# Patient Record
Sex: FEMALE | Race: WHITE | Employment: OTHER | URBAN - METROPOLITAN AREA
[De-identification: names, ages, dates, MRNs, and addresses within clinical notes are randomized per-mention and may not be internally consistent; named-entity substitution may affect disease eponyms.]

---

## 2020-05-21 ENCOUNTER — NURSE TRIAGE (OUTPATIENT)
Dept: NURSING | Facility: CLINIC | Age: 85
End: 2020-05-21

## 2020-05-21 NOTE — TELEPHONE ENCOUNTER
Dementia and fell on Monday. Refused transport to the ER. She's fallen one other time.  She is at assisted living. One foot and leg is swollen. They suggested to go to urgent care. Is there something else to do? She's deaf.   Doesn't know what extent of injury doesn't know how bad leg is. It's a closed facility because of Covid-19. They removed the chemicals from the apartment.  I suggested she have Jewels transported to the ER for evaluation because of her refusal of care, dementia and fall histories. Jewels is related to the caller's  so we discussed the need for Jewels to sign a release of information consent so they ER staff can talk with family to fill in the history. Advised 911 if she's unable to get Jewels to the ER via other transport.  Tish Foster RN  [facility]    Additional Information    Negative: Nursing judgment    Negative: Nursing judgment    Negative: Nursing judgment    Negative: Nursing judgment    Information only question and nurse able to answer    Protocols used: NO PROTOCOL AVAILABLE - INFORMATION ONLY-A-[place]

## 2020-05-22 ENCOUNTER — DOCUMENTATION ONLY (OUTPATIENT)
Dept: OTHER | Facility: CLINIC | Age: 85
End: 2020-05-22

## 2020-05-22 ENCOUNTER — VIRTUAL VISIT (OUTPATIENT)
Dept: GERIATRICS | Facility: CLINIC | Age: 85
End: 2020-05-22
Payer: MEDICARE

## 2020-05-22 DIAGNOSIS — R29.6 RECURRENT FALLS: ICD-10-CM

## 2020-05-22 DIAGNOSIS — F02.818 DEMENTIA ASSOCIATED WITH OTHER UNDERLYING DISEASE WITH BEHAVIORAL DISTURBANCE (H): Primary | ICD-10-CM

## 2020-05-22 DIAGNOSIS — M40.00 ACQUIRED POSTURAL KYPHOSIS: ICD-10-CM

## 2020-05-22 DIAGNOSIS — B35.1 ONYCHOMYCOSIS DUE TO DERMATOPHYTE: ICD-10-CM

## 2020-05-22 DIAGNOSIS — M79.89 RIGHT LEG SWELLING: ICD-10-CM

## 2020-05-22 DIAGNOSIS — H90.3 BILATERAL SENSORINEURAL HEARING LOSS: ICD-10-CM

## 2020-05-22 NOTE — LETTER
"    5/22/2020        RE: Jewels Cortez  [place] [address]  [address]  [facility] [address]         [facility]  Jewels Cortez is being evaluated via a billable video visit due to the restrictions of the Covid-19 pandemic.   The patient has been notified of following:    \"This video visit will be conducted via a call between you and your provider. We have found that certain health care needs can be provided without the need for an in-person physical exam.  This service lets us provide the care you need with a video conversation. If during the course of the call the provider feels a video visit is not appropriate, you will not be charged for this service.\"   The provider has received verbal consent for a Video Visit from the patient and or first contact? Yes  Patient/facility staff would like the video invitation sent by: Send to e-mail at: [facility] Start Time: 12:04  Which Facility the Patient is at during the time of visit: [facility]    PRIMARY CARE PROVIDER AND CLINIC:  Holden Block, LAY CNP, [address] [address] / [facility] [address]  Chief Complaint   Patient presents with     Establish Care     Video Visit     [facility] Medical Record Number:  [id number]  Jewels Cortez  is a 85 year old  (1935), admitted to the above facility from home..  Admitted to this facility for  rehab, medical management and nursing care.  HPI:    HPI information obtained from: facility chart records, facility staff and patient report.   Ms. Kline moved to [place] from [place] because her nephew lives here.  She has no other family, she is a .  [facility]  However she has had a couple of falls or found on the floor with the last incident.  She was looking for papers.  She was also stating that someone came in and stole her kitchen cabinets.  She is profoundly hard of hearing and the nurse with her is able " "to get my questions asked.  Today she tells me she feels safe in her current apartment and there are a lot of nice people around to help her.  She denies pain.  She confirms she takes no medications.  Does admit to taking Tylenol sometimes for a back ache.  States her bowels are working fine and since her bladder surgery with hysterectomy, her bladder works well.  Denies any breathing problems, no heart problems.  Never gets headaches.  States her appetite is good, no difficulty chewing or swallowing.  I did a brief memory assessment and she does have difficulty with recall and names.  She did state the name of the facility she is at.  The President is \"the one I didn't vote for.\"    We talked about her falling and she is agreeable to getting assessed ane will use a walker.    Regarding her right leg swelling she said it did happen about 2 or 3 times before, doesn't remember what pill the Doctor in [place] gave her.     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)  Right leg swelling  Bilateral sensorineural hearing loss  Recurrent falls  Onychomycosis due to dermatophyte  Acquired postural kyphosis    CODE STATUS/ADVANCE DIRECTIVES DISCUSSION:   CPR/Full code , as per POLST she signed    Patient's living condition: lives in an assisted living facility     ALLERGIES: Penicillins and Sulfa drugs     PAST MEDICAL HISTORY:  has a past medical history of Acquired postural kyphosis (5/23/2020), Bilateral sensorineural hearing loss wears hearing aides (5/22/2020), Cancer (H), Cataract (2019), 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  PAST SURGICAL HISTORY:   has a past surgical history that includes Hysterectomy; CERVICAL NECK SURGERY; LUMBAR BACK SURGERY; BLADDER LIFT SURGERY; cataract iol, rt/lt (Bilateral, 2020); and skin " cancer.  FAMILY HISTORY: she has no living children, her parents passed away many years ago and cannot recall their medical history.  SOCIAL HISTORY:   reports that she has never smoked. She has never used smokeless tobacco. She reports previous alcohol use. She reports that she does not use drugs.     MEDICATIONS:  NONE  No current outpatient medications on file.        ROS: 10 point ROS of systems including Constitutional, Eyes, Respiratory, Cardiovascular, Gastroenterology, Genitourinary, Integumentary, Musculoskeletal, Psychiatric were all negative except for pertinent positives noted in my HPI.  Vitals:/52   Pulse 69   Temp 98.8  F (37.1  C)   SpO2 98%    Limited Visit Exam done given COVID-19 precautions:  GENERAL APPEARANCE:  Alert, in no distress, thin  ENT:  [place]  EYES:  Conjunctiva and lids normal  RESP:  no respiratory distress  CV:  peripheral edema 3+ in right leg none in left leg  M/S:   Gait and station abnormal severe kyphosis  Digits and nails abnormal large phylangeal joints consistent with osteoarthritis.  all 10 toenails very long, hypertrophic .  Right knee pain with lateral and flexing movements  SKIN:  Inspection of skin and subcutaneous tissuethin and fragile.  multiple deep purple bruises noted on arms and legs..  varicose veins noted both legs  PSYCH:  insight and judgement impaired, memory impaired , affect and mood normal    Lab/Diagnostic data:  none to review.  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)  (primary encounter diagnosis)  Comment: progressive and supports her need or Assisted Living Level of care   Plan: assisted living package that includes light house keeping, mediation management (no medication at this time), assist with ADLs    (M79.89) Right leg swelling  Comment: acute onset with remote history 2-3 times before  Plan: Right leg venous  Ultrasound to rule out DVT 2/2 unilateral edema    (H90.3) Bilateral sensorineural hearing loss wears hearing aides  Comment: profound  Plan: adjust speaking tone and volume to communicate  Use Pocket talker    (R29.6) Recurrent falls 2 recent  Comment: due to poor recall, unsure of preceding event to prompt the falls   Plan: remove any rugs in her apartment, therapy to assess for Walker    (B35.1) Onychomycosis due to dermatophyte  Comment: could be contributing to falls  Plan: refer to Podiatrist    (M40.00) Acquired postural kyphosis  Comment: severe forward bending  Plan: consider Tylenol PRN for occasional pain      Electronically signed by:  LAY Fung CNP     Video-Visit Details  Type of service:  Video Visit  Video End Time (time video stopped): 12:38  Distant Location (provider location):  [facility]                 Sincerely,        LAY Fung CNP

## 2020-05-22 NOTE — PROGRESS NOTES
" [facility]  Jewels Cortez is being evaluated via a billable video visit due to the restrictions of the Covid-19 pandemic.   The patient has been notified of following:    \"This video visit will be conducted via a call between you and your provider. We have found that certain health care needs can be provided without the need for an in-person physical exam.  This service lets us provide the care you need with a video conversation. If during the course of the call the provider feels a video visit is not appropriate, you will not be charged for this service.\"   The provider has received verbal consent for a Video Visit from the patient and or first contact? Yes  Patient/facility staff would like the video invitation sent by: Send to e-mail at: [employer] Start Time: 12:04  Which Facility the Patient is at during the time of visit: [facility]    PRIMARY CARE PROVIDER AND CLINIC:  Holden Block, LAY CNP, [address] [facility] [address] / [facility] [address]  Chief Complaint   Patient presents with     Establish Care     Video Visit     [facility] Medical Record Number:  [id number]  Jewels Cortez  is a 85 year old  (1935), admitted to the above facility from home..  Admitted to this facility for  rehab, medical management and nursing care.  HPI:    HPI information obtained from: facility chart records, facility staff and patient report.   Ms. Kline moved to [place] from [place] because her nephew lives here.  She has no other family, she is a .  She moved into [facility] initially at the independent level.  However she has had a couple of falls or found on the floor with the last incident.  She was looking for papers.  She was also stating that someone came in and stole her kitchen cabinets.  She is profoundly hard of hearing and the nurse with her is able to get my questions asked.  Today she tells me she feels safe in her current apartment and there are a lot of " "nice people around to help her.  She denies pain.  She confirms she takes no medications.  Does admit to taking Tylenol sometimes for a back ache.  States her bowels are working fine and since her bladder surgery with hysterectomy, her bladder works well.  Denies any breathing problems, no heart problems.  Never gets headaches.  States her appetite is good, no difficulty chewing or swallowing.  I did a brief memory assessment and she does have difficulty with recall and names.  She did state the name of the facility she is at.  The President is \"the one I didn't vote for.\"    We talked about her falling and she is agreeable to getting assessed ane will use a walker.    Regarding her right leg swelling she said it did happen about 2 or 3 times before, doesn't remember what pill the Doctor in [place] gave her.     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)  Right leg swelling  Bilateral sensorineural hearing loss  Recurrent falls  Onychomycosis due to dermatophyte  Acquired postural kyphosis    CODE STATUS/ADVANCE DIRECTIVES DISCUSSION:   CPR/Full code , as per POLST she signed    Patient's living condition: lives in an assisted living facility     ALLERGIES: Penicillins and Sulfa drugs     PAST MEDICAL HISTORY:  has a past medical history of Acquired postural kyphosis (5/23/2020), Bilateral sensorineural hearing loss wears hearing aides (5/22/2020), Cancer (H), Cataract (2019), 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  PAST SURGICAL HISTORY:   has a past surgical history that includes Hysterectomy; CERVICAL NECK SURGERY; LUMBAR BACK SURGERY; BLADDER LIFT SURGERY; cataract iol, rt/lt (Bilateral, 2020); and skin cancer.  FAMILY HISTORY: she has no living children, her parents passed away many years ago and cannot recall their medical " history.  SOCIAL HISTORY:   reports that she has never smoked. She has never used smokeless tobacco. She reports previous alcohol use. She reports that she does not use drugs.     MEDICATIONS:  NONE  No current outpatient medications on file.        ROS: 10 point ROS of systems including Constitutional, Eyes, Respiratory, Cardiovascular, Gastroenterology, Genitourinary, Integumentary, Musculoskeletal, Psychiatric were all negative except for pertinent positives noted in my HPI.  Vitals:/52   Pulse 69   Temp 98.8  F (37.1  C)   SpO2 98%    Limited Visit Exam done given COVID-19 precautions:  GENERAL APPEARANCE:  Alert, in no distress, thin  ENT:  [place]  EYES:  Conjunctiva and lids normal  RESP:  no respiratory distress  CV:  peripheral edema 3+ in right leg none in left leg  M/S:   Gait and station abnormal severe kyphosis  Digits and nails abnormal large phylangeal joints consistent with osteoarthritis.  all 10 toenails very long, hypertrophic .  Right knee pain with lateral and flexing movements  SKIN:  Inspection of skin and subcutaneous tissuethin and fragile.  multiple deep purple bruises noted on arms and legs..  varicose veins noted both legs  PSYCH:  insight and judgement impaired, memory impaired , affect and mood normal    Lab/Diagnostic data:  none to review.  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)  (primary encounter diagnosis)  Comment: progressive and supports her need or Assisted Living Level of care   Plan: assisted living package that includes light house keeping, mediation management (no medication at this time), assist with ADLs    (M79.89) Right leg swelling  Comment: acute onset with remote history 2-3 times before  Plan: Right leg venous Ultrasound to rule out DVT 2/2 unilateral edema    (H90.3) Bilateral sensorineural hearing loss wears hearing aides  Comment:  profound  Plan: adjust speaking tone and volume to communicate  Use Pocket talker    (R29.6) Recurrent falls 2 recent  Comment: due to poor recall, unsure of preceding event to prompt the falls   Plan: remove any rugs in her apartment, therapy to assess for Walker    (B35.1) Onychomycosis due to dermatophyte  Comment: could be contributing to falls  Plan: refer to Podiatrist    (M40.00) Acquired postural kyphosis  Comment: severe forward bending  Plan: consider Tylenol PRN for occasional pain      Electronically signed by:  LAY Fung CNP     Video-Visit Details  Type of service:  Video Visit  Video End Time (time video stopped): 12:38  Distant Location (provider location):  [facility]

## 2020-05-23 ENCOUNTER — TELEPHONE (OUTPATIENT)
Dept: GERIATRICS | Facility: CLINIC | Age: 85
End: 2020-05-23

## 2020-05-23 VITALS
TEMPERATURE: 98.8 F | SYSTOLIC BLOOD PRESSURE: 127 MMHG | DIASTOLIC BLOOD PRESSURE: 52 MMHG | OXYGEN SATURATION: 98 % | HEART RATE: 69 BPM

## 2020-05-23 PROBLEM — R29.6 RECURRENT FALLS: Status: ACTIVE | Noted: 2020-05-23

## 2020-05-23 PROBLEM — F02.818 DEMENTIA ASSOCIATED WITH OTHER UNDERLYING DISEASE WITH BEHAVIORAL DISTURBANCE (H): Status: ACTIVE | Noted: 2020-05-23

## 2020-05-23 PROBLEM — M40.00 ACQUIRED POSTURAL KYPHOSIS: Status: ACTIVE | Noted: 2020-05-23

## 2020-05-23 PROBLEM — B35.1 ONYCHOMYCOSIS DUE TO DERMATOPHYTE: Status: ACTIVE | Noted: 2020-05-23

## 2020-05-23 NOTE — TELEPHONE ENCOUNTER
Nursing called in the venous doppler report which showed a baker's cyst but no blood clot seen.    Nursing stated they will continue to monitor as she is new to facility and update primary NP on Tuesday.    Electronically signed by Mariana Cobos RN, CNP

## 2020-10-01 ENCOUNTER — ASSISTED LIVING VISIT (OUTPATIENT)
Dept: GERIATRICS | Facility: CLINIC | Age: 85
End: 2020-10-01
Payer: MEDICARE

## 2020-10-01 ENCOUNTER — TRANSFERRED RECORDS (OUTPATIENT)
Dept: HEALTH INFORMATION MANAGEMENT | Facility: CLINIC | Age: 85
End: 2020-10-01

## 2020-10-01 ENCOUNTER — RECORDS - HEALTHEAST (OUTPATIENT)
Dept: LAB | Facility: CLINIC | Age: 85
End: 2020-10-01

## 2020-10-01 VITALS
OXYGEN SATURATION: 98 % | SYSTOLIC BLOOD PRESSURE: 137 MMHG | HEART RATE: 65 BPM | DIASTOLIC BLOOD PRESSURE: 76 MMHG | RESPIRATION RATE: 16 BRPM | TEMPERATURE: 98.6 F

## 2020-10-01 DIAGNOSIS — F03.91 DEMENTIA WITH BEHAVIORAL DISTURBANCE, UNSPECIFIED DEMENTIA TYPE: ICD-10-CM

## 2020-10-01 DIAGNOSIS — M79.89 RIGHT LEG SWELLING: ICD-10-CM

## 2020-10-01 DIAGNOSIS — R22.31 MASS OF RIGHT HAND: Primary | ICD-10-CM

## 2020-10-01 LAB
ALBUMIN UR-MCNC: ABNORMAL MG/DL
APPEARANCE UR: ABNORMAL
BACTERIA #/AREA URNS HPF: ABNORMAL HPF
BILIRUB UR QL STRIP: NEGATIVE
COLOR UR AUTO: YELLOW
GLUCOSE UR STRIP-MCNC: NEGATIVE MG/DL
HGB UR QL STRIP: NEGATIVE
KETONES UR STRIP-MCNC: NEGATIVE MG/DL
LEUKOCYTE ESTERASE UR QL STRIP: ABNORMAL
MUCOUS THREADS #/AREA URNS LPF: ABNORMAL LPF
NITRATE UR QL: POSITIVE
PH UR STRIP: 6.5 [PH] (ref 4.5–8)
RBC #/AREA URNS AUTO: ABNORMAL HPF
SP GR UR STRIP: 1.02 (ref 1–1.03)
SQUAMOUS #/AREA URNS AUTO: ABNORMAL LPF
TRANS CELLS #/AREA URNS HPF: ABNORMAL LPF
UROBILINOGEN UR STRIP-ACNC: ABNORMAL
WBC #/AREA URNS AUTO: ABNORMAL HPF

## 2020-10-01 NOTE — LETTER
10/1/2020        RE: Jewels [facility]  [address]  [address]  [facility] [address]        [facility]  [facility] Record Number: [id number]  Place of Service where encounter took place: [facility] ([facility]) [[id number]]  Chief Complaint   Patient presents with     Pain     Dementia       HPI:    Jewels Cortez is a 85 year old (1935), who is being seen today for an episodic care visit. HPI information obtained from: facility chart records, facility staff, patient report and [facility] chart review. Today's concern is: R hand mass, dementia, R LE edema.  Last week noted to have bruising of R hand/fingers.  Did not recall falling.  Now staff reports mass, dorsal surface R hand. Per staff has had paranoia at times, delusions.  Has memory loss due to dementia. H/o alt. Mental status while living in [place].  6/6/19-sent to ED per Adult Protective Services.  In-pt psych stay 6/7/19-6/14/20. Has taken abilify in past. 5/20 had R LE edema.  US done neg for DVT.  Did show baker's cyst R knee.  No recent edema.       Past Medical and Surgical History reviewed in Epic today.    MEDICATIONS:    No current outpatient medications on file.     REVIEW OF SYSTEMS:  No chest pain, shortness of breath, fevers, chills, headache, nausea, vomiting, dysuria or bowel abnormalities.  Appetite is  fair.  No pain except occ back.    Objective:  /76   Pulse 65   Temp 98.6  F (37  C)   Resp 16   SpO2 98%   Exam:  GENERAL APPEARANCE:  Alert, anxious, cooperative  ENT:  Mouth and posterior oropharynx normal, moist mucous membranes, [place]  EYES:  EOM, conjunctivae, lids, pupils and irises normal, PERRL  NECK:  No adenopathy,masses or thyromegaly, FROM  RESP:  respiratory effort and palpation of chest normal, lungs clear to auscultation , no respiratory distress  CV:  Palpation and auscultation of heart done , regular rate and rhythm, no murmur, rub, or gallop, no  edema  ABDOMEN:  normal bowel sounds, soft, nontender, no hepatosplenomegaly or other masses, no guarding or rebound  M/S:   Gait and station normal  kyphosis  SKIN:  no bruising of R hand. resolving hematoma dorsal surface R hand  NEURO:   Cranial nerves 2-12 are normal tested and grossly at patient's baseline, speech clear  PSYCH:  insight and judgement impaired, memory impaired , paranoia present.  feels male neighbor has been knocking on her door.  feels he may have a gun, anxious    Labs:   Recent labs in [facility] reviewed by me today.  7/18 cbc, bmp stable    ASSESSMENT/PLAN:  Mass of right hand  resolving hematoma. No pain with ROM, grasp R hand. No current bruising. Unclear etiology of trauma to R hand  1. Monitor for reports of pain of R hand  2. Monitor for further s/s trauma-bruising  3. Monitor for changes in gait, falls    Dementia with behavioral disturbance, unspecified dementia type (H)  Memory loss. Recent increase in psychosis.  H/o alt. Mental status with hosp. Stay 6/19-ED head CT neg, UA neg.  1. Send UA/UC to r/o UTI  2. Consider pm safety checks  3. For episodes of leaving apt at hs, may start door alarm at hs  4. Monitor for further paranoia-did feel better once staff explained her neighbor no longer lives in apt-has moved out    Right leg swelling  Currently resolved  1. Monitor for further edema of LEs  2. Monitor for R knee pain r/t Baker's cyst  3. Encourage increased amb. As roseann  4. Elevate LEs        Electronically signed by:  LAY Wheatley CNP           Sincerely,        LAY Wheatley CNP

## 2020-10-01 NOTE — PROGRESS NOTES
[facility]  [facility] Record Number: [id number]  Place of Service where encounter took place: [facility]  [facility] ([facility]) [[id number]]  Chief Complaint   Patient presents with     Pain     Dementia       HPI:    Jewels Cortez is a 85 year old (1935), who is being seen today for an episodic care visit. HPI information obtained from: facility chart records, facility staff, patient report and [facility] chart review. Today's concern is: R hand mass, dementia, R LE edema.  Last week noted to have bruising of R hand/fingers.  Did not recall falling.  Now staff reports mass, dorsal surface R hand. Per staff has had paranoia at times, delusions.  Has memory loss due to dementia. H/o alt. Mental status while living in [place].  6/6/19-sent to ED per Adult Protective Services.  In-pt psych stay 6/7/19-6/14/20. Has taken abilify in past. 5/20 had R LE edema.  US done neg for DVT.  Did show baker's cyst R knee.  No recent edema.       Past Medical and Surgical History reviewed in Epic today.    MEDICATIONS:    No current outpatient medications on file.     REVIEW OF SYSTEMS:  No chest pain, shortness of breath, fevers, chills, headache, nausea, vomiting, dysuria or bowel abnormalities.  Appetite is  fair.  No pain except occ back.    Objective:  /76   Pulse 65   Temp 98.6  F (37  C)   Resp 16   SpO2 98%   Exam:  GENERAL APPEARANCE:  Alert, anxious, cooperative  ENT:  Mouth and posterior oropharynx normal, moist mucous membranes, [place]  EYES:  EOM, conjunctivae, lids, pupils and irises normal, PERRL  NECK:  No adenopathy,masses or thyromegaly, FROM  RESP:  respiratory effort and palpation of chest normal, lungs clear to auscultation , no respiratory distress  CV:  Palpation and auscultation of heart done , regular rate and rhythm, no murmur, rub, or gallop, no edema  ABDOMEN:  normal bowel sounds, soft, nontender, no hepatosplenomegaly or other masses, no guarding or rebound  M/S:    Gait and station normal  kyphosis  SKIN:  no bruising of R hand. resolving hematoma dorsal surface R hand  NEURO:   Cranial nerves 2-12 are normal tested and grossly at patient's baseline, speech clear  PSYCH:  insight and judgement impaired, memory impaired , paranoia present.  feels male neighbor has been knocking on her door.  feels he may have a gun, anxious    Labs:   Recent labs in EPIC reviewed by me today.  7/18 cbc, bmp stable    ASSESSMENT/PLAN:  Mass of right hand  resolving hematoma. No pain with ROM, grasp R hand. No current bruising. Unclear etiology of trauma to R hand  1. Monitor for reports of pain of R hand  2. Monitor for further s/s trauma-bruising  3. Monitor for changes in gait, falls    Dementia with behavioral disturbance, unspecified dementia type (H)  Memory loss. Recent increase in psychosis.  H/o alt. Mental status with hosp. Stay 6/19-ED head CT neg, UA neg.  1. Send UA/UC to r/o UTI  2. Consider pm safety checks  3. For episodes of leaving apt at hs, may start door alarm at hs  4. Monitor for further paranoia-did feel better once staff explained her neighbor no longer lives in apt-has moved out    Right leg swelling  Currently resolved  1. Monitor for further edema of LEs  2. Monitor for R knee pain r/t Baker's cyst  3. Encourage increased amb. As roseann  4. Elevate LEs        Electronically signed by:  LAY Wheatley CNP

## 2020-10-04 LAB — BACTERIA SPEC CULT: ABNORMAL

## 2021-02-05 ENCOUNTER — RECORDS - HEALTHEAST (OUTPATIENT)
Dept: LAB | Facility: CLINIC | Age: 86
End: 2021-02-05

## 2021-02-05 LAB
ALBUMIN UR-MCNC: ABNORMAL MG/DL
APPEARANCE UR: ABNORMAL
BACTERIA #/AREA URNS HPF: ABNORMAL HPF
BILIRUB UR QL STRIP: NEGATIVE
COLOR UR AUTO: YELLOW
GLUCOSE UR STRIP-MCNC: NEGATIVE MG/DL
HGB UR QL STRIP: ABNORMAL
KETONES UR STRIP-MCNC: ABNORMAL MG/DL
LEUKOCYTE ESTERASE UR QL STRIP: ABNORMAL
MUCOUS THREADS #/AREA URNS LPF: ABNORMAL LPF
NITRATE UR QL: POSITIVE
PH UR STRIP: 5.5 [PH] (ref 4.5–8)
RBC #/AREA URNS AUTO: ABNORMAL HPF
SP GR UR STRIP: 1.02 (ref 1–1.03)
SQUAMOUS #/AREA URNS AUTO: ABNORMAL LPF
TRANS CELLS #/AREA URNS HPF: ABNORMAL LPF
UROBILINOGEN UR STRIP-ACNC: ABNORMAL
WBC #/AREA URNS AUTO: ABNORMAL HPF
WBC CLUMPS #/AREA URNS HPF: PRESENT /[HPF]

## 2021-02-06 ENCOUNTER — TELEPHONE (OUTPATIENT)
Dept: GERIATRICS | Facility: CLINIC | Age: 86
End: 2021-02-06

## 2021-02-06 NOTE — TELEPHONE ENCOUNTER
UA results returned, cloudy, nitrite +, many bacteria, has been calling 911 due to confusion, afebrile, previous UTI's.  -cipro (taken previously) 250mg BID x7d

## 2021-02-08 ENCOUNTER — ASSISTED LIVING VISIT (OUTPATIENT)
Dept: GERIATRICS | Facility: CLINIC | Age: 86
End: 2021-02-08
Payer: MEDICARE

## 2021-02-08 VITALS
SYSTOLIC BLOOD PRESSURE: 124 MMHG | TEMPERATURE: 98.1 F | RESPIRATION RATE: 18 BRPM | DIASTOLIC BLOOD PRESSURE: 71 MMHG | OXYGEN SATURATION: 93 % | HEART RATE: 77 BPM

## 2021-02-08 DIAGNOSIS — M40.00 ACQUIRED POSTURAL KYPHOSIS: ICD-10-CM

## 2021-02-08 DIAGNOSIS — F03.91 DEMENTIA WITH BEHAVIORAL DISTURBANCE, UNSPECIFIED DEMENTIA TYPE: ICD-10-CM

## 2021-02-08 DIAGNOSIS — N30.00 ACUTE CYSTITIS WITHOUT HEMATURIA: Primary | ICD-10-CM

## 2021-02-08 LAB — BACTERIA SPEC CULT: ABNORMAL

## 2021-02-08 NOTE — LETTER
2/8/2021        RE: Jewels [facility]kes Of [place]  [address]  [address]  [facility] [address]        [place] [facility]  [place] Medical Record Number: [id number]  Place of Service where encounter took place: [facility] [employer]  [employer] ([facility]) [[id number]]  Chief Complaint   Patient presents with     UTI       HPI:    Jewels Cortez is a 85 year old (1935), who is being seen today for an episodic care visit. HPI information obtained from: facility chart records, facility staff, patient report and [facility] chart review. Today's concern is: UTI, dementia, kyphosis.  Per staff, had increased confusion, paranoia 2/6/21.  Has h/o UTI with paranoia.  Has memory loss due to dementia. UA+, UC showed sensitivity to cipro.  Has been afebrile.  Denies current dysuria.  Has h/o bladder lift surg. Some paranoia today-reporting people coming in her room.  Has sig. Hearing loss, may not understand people coming into her room are staff.  Per staff, forgets at times to recharge her hearing aids, making communication difficult.  Hearing aids in place during exam, is able to follow conversation.  Has severe kyphosis, flexion at waist. Also leans to R with amb. Gait steady. No recent falls. No reports of back pain.  Per staff, no recent functional decline in ADLs.       Past Medical and Surgical History reviewed in Epic today.    MEDICATIONS:    No current outpatient medications on file.     REVIEW OF SYSTEMS:  Unobtainable secondary to cognitive impairment. Reports feeling ok today. No current dysuria    Objective:  /71   Pulse 77   Temp 98.1  F (36.7  C)   Resp 18   SpO2 93%   Exam:  GENERAL APPEARANCE:  Alert, in no distress, cooperative  ENT:  Mouth and posterior oropharynx normal, moist mucous membranes, [place]  EYES:  EOM, conjunctivae, lids, pupils and irises normal, PERRL  NECK:  No adenopathy,masses or thyromegaly, FROM  RESP:  respiratory effort and palpation of chest normal, lungs  clear to auscultation , no respiratory distress  CV:  Palpation and auscultation of heart done , regular rate and rhythm, no murmur, rub, or gallop, no edema  ABDOMEN:  normal bowel sounds, soft, nontender, no hepatosplenomegaly or other masses, no guarding or rebound  M/S:   gait steady. flexed at waist, kyphosis  NEURO:   Cranial nerves 2-12 are normal tested and grossly at patient's baseline, speech clear  PSYCH:  insight and judgement impaired, memory impaired , affect and mood normal    Labs:   Recent labs in [facility] reviewed by me today.     ASSESSMENT/PLAN:  Acute cystitis without hematuria  Increase in confusion last week, no current dysuria  1. Complete cipro course  2. Monitor for fever, dysuria  3. Encourage increase fluid intake  4. Monitor for increase in confusion    Dementia with behavioral disturbance, unspecified dementia type (H)  Memory loss. Recent increased paranoia, likely r/t #1. [place]  1. Monitor for further paranoia  2. Remind to charge hearing aids, wear hearing aids  3. Staff to redirect paranoia of people coming into her apt-explain these are staff members for her care  4. Reassess paranoia over next week once cipro course complete    Acquired postural kyphosis  No recent c/o back pain, no recent falls  1. Monitor for unsteady gait, falls  2. Monitor for low back pain  3. Monitor for functional decline, need of increased assist with ADLs      Electronically signed by:  LAY Wheatley CNP               Sincerely,        LAY Wheatley CNP

## 2021-02-08 NOTE — PROGRESS NOTES
[place] [facility]  [place] Medical Record Number: [id number]  Place of Service where encounter took place: [facility]  [facility] ([facility]) [[id number]]  Chief Complaint   Patient presents with     UTI       HPI:    Jewels Cortez is a 85 year old (1935), who is being seen today for an episodic care visit. HPI information obtained from: facility chart records, facility staff, patient report and [facility] chart review. Today's concern is: UTI, dementia, kyphosis.  Per staff, had increased confusion, paranoia 2/6/21.  Has h/o UTI with paranoia.  Has memory loss due to dementia. UA+, UC showed sensitivity to cipro.  Has been afebrile.  Denies current dysuria.  Has h/o bladder lift surg. Some paranoia today-reporting people coming in her room.  Has sig. Hearing loss, may not understand people coming into her room are staff.  Per staff, forgets at times to recharge her hearing aids, making communication difficult.  Hearing aids in place during exam, is able to follow conversation.  Has severe kyphosis, flexion at waist. Also leans to R with amb. Gait steady. No recent falls. No reports of back pain.  Per staff, no recent functional decline in ADLs.       Past Medical and Surgical History reviewed in Epic today.    MEDICATIONS:    No current outpatient medications on file.     REVIEW OF SYSTEMS:  Unobtainable secondary to cognitive impairment. Reports feeling ok today. No current dysuria    Objective:  /71   Pulse 77   Temp 98.1  F (36.7  C)   Resp 18   SpO2 93%   Exam:  GENERAL APPEARANCE:  Alert, in no distress, cooperative  ENT:  Mouth and posterior oropharynx normal, moist mucous membranes, [place]  EYES:  EOM, conjunctivae, lids, pupils and irises normal, PERRL  NECK:  No adenopathy,masses or thyromegaly, FROM  RESP:  respiratory effort and palpation of chest normal, lungs clear to auscultation , no respiratory distress  CV:  Palpation and auscultation of heart done , regular rate  Instructed on the risks of formula feeding including:   Lacks the nutrients found in colostrums to help prevent infection, mature the gut, aid in digestion and resist allergies   Contains artificial additives and preservatives which increases incidence of contamination   Increase spitting up due to slower digestion   Increased cost and requires preparation, including bottle sanitation and formula refrigeration   Increased incidence of NEC for the  baby   Increased risk of diabetes with family history, SIDS and ear infections   Skipped feedings for the breastfeeding mother increases chance of engorgement, mastitis and plugged ducts   Decreases breastfeeding babys appetite resulting in poor feeding session, decreased breast stimulation and poor milk supply   Exposes the breastfeeding baby to the possibility of allergic reactions and colic  Pt states understanding and verbalized appropriate recall.     and rhythm, no murmur, rub, or gallop, no edema  ABDOMEN:  normal bowel sounds, soft, nontender, no hepatosplenomegaly or other masses, no guarding or rebound  M/S:   gait steady. flexed at waist, kyphosis  NEURO:   Cranial nerves 2-12 are normal tested and grossly at patient's baseline, speech clear  PSYCH:  insight and judgement impaired, memory impaired , affect and mood normal    Labs:   Recent labs in [facility] reviewed by me today.     ASSESSMENT/PLAN:  Acute cystitis without hematuria  Increase in confusion last week, no current dysuria  1. Complete cipro course  2. Monitor for fever, dysuria  3. Encourage increase fluid intake  4. Monitor for increase in confusion    Dementia with behavioral disturbance, unspecified dementia type (H)  Memory loss. Recent increased paranoia, likely r/t #1. [place]  1. Monitor for further paranoia  2. Remind to charge hearing aids, wear hearing aids  3. Staff to redirect paranoia of people coming into her apt-explain these are staff members for her care  4. Reassess paranoia over next week once cipro course complete    Acquired postural kyphosis  No recent c/o back pain, no recent falls  1. Monitor for unsteady gait, falls  2. Monitor for low back pain  3. Monitor for functional decline, need of increased assist with ADLs      Electronically signed by:  LAY Wheatley CNP

## 2021-02-22 ENCOUNTER — RECORDS - HEALTHEAST (OUTPATIENT)
Dept: LAB | Facility: CLINIC | Age: 86
End: 2021-02-22

## 2021-02-22 LAB
ALBUMIN UR-MCNC: ABNORMAL MG/DL
APPEARANCE UR: ABNORMAL
BACTERIA #/AREA URNS HPF: ABNORMAL HPF
BILIRUB UR QL STRIP: NEGATIVE
COLOR UR AUTO: YELLOW
GLUCOSE UR STRIP-MCNC: NEGATIVE MG/DL
HGB UR QL STRIP: NEGATIVE
KETONES UR STRIP-MCNC: NEGATIVE MG/DL
LEUKOCYTE ESTERASE UR QL STRIP: ABNORMAL
MUCOUS THREADS #/AREA URNS LPF: ABNORMAL LPF
NITRATE UR QL: NEGATIVE
PH UR STRIP: 7 [PH] (ref 4.5–8)
RBC #/AREA URNS AUTO: ABNORMAL HPF
SP GR UR STRIP: 1.02 (ref 1–1.03)
SQUAMOUS #/AREA URNS AUTO: ABNORMAL LPF
TRANS CELLS #/AREA URNS HPF: ABNORMAL LPF
UROBILINOGEN UR STRIP-ACNC: ABNORMAL
WBC #/AREA URNS AUTO: ABNORMAL HPF
WBC CLUMPS #/AREA URNS HPF: PRESENT /[HPF]

## 2021-02-23 LAB — BACTERIA SPEC CULT: NO GROWTH

## 2021-03-22 ENCOUNTER — ASSISTED LIVING VISIT (OUTPATIENT)
Dept: GERIATRICS | Facility: CLINIC | Age: 86
End: 2021-03-22
Payer: MEDICARE

## 2021-03-22 VITALS
OXYGEN SATURATION: 98 % | HEART RATE: 67 BPM | SYSTOLIC BLOOD PRESSURE: 139 MMHG | DIASTOLIC BLOOD PRESSURE: 71 MMHG | RESPIRATION RATE: 16 BRPM

## 2021-03-22 DIAGNOSIS — F22 PARANOIA (H): Primary | ICD-10-CM

## 2021-03-22 DIAGNOSIS — M40.00 ACQUIRED POSTURAL KYPHOSIS: ICD-10-CM

## 2021-03-22 DIAGNOSIS — H90.3 BILATERAL SENSORINEURAL HEARING LOSS: ICD-10-CM

## 2021-03-22 NOTE — PROGRESS NOTES
[place] [facility]  [place] Medical Record Number:  [id number]  Place of Service where encounter took place:  [facility]  [facility] ([facility]) [[id number]]  Chief Complaint   Patient presents with     Paranoid       HPI:    Jewels Cortez  is a 85 year old (1935), who is being seen today for an episodic care visit.  HPI information obtained from: facility chart records, facility staff, patient report and [facility] chart review. Today's concern is: paranoia, hearing loss, kyphosis.  Has memory loss due to dementia. Cont To Whom It May Concern have paranoia at times, limited insight into dementia. Per staff, has episodes of increased confusion at times. Has sever hearing loss, somewhat improved when wearing hearing aids.  Reports hearing others talking about her in hallway and next door at times.  Per staff, has called police on occasion due to not feeling safe.  Has refused increased staff assist. Has refused cognitive testing. Today discussed referral to -decline.  Not currently taking meds.  In past, has not wanted staff to manage meds.  Has h/o cervical and lumbar spine surgeries.  Has severe postural kyphoses with flexion at waist.  Does not always use walker. In apt. Has refuses any therapies.  No recent falls.       Past Medical and Surgical History reviewed in Epic today.    MEDICATIONS:    No current outpatient medications on file.         REVIEW OF SYSTEMS:  No chest pain, shortness of breath, fevers, chills, headache, nausea, vomiting, dysuria or bowel abnormalities.  Appetite is  normal.  No pain except occ aches.    Objective:  /71   Pulse 67   Resp 16   SpO2 98%   Exam:  GENERAL APPEARANCE:  Alert, in no distress, cooperative  ENT:  Mouth and posterior oropharynx normal, moist mucous membranes, [place]  EYES:  EOM, conjunctivae, lids, pupils and irises normal, PERRL  RESP:  respiratory effort and palpation of chest normal, lungs clear to auscultation , no  respiratory distress  CV:  Palpation and auscultation of heart done , regular rate and rhythm, no murmur, rub, or gallop, no edema  ABDOMEN:  normal bowel sounds, soft, nontender, no hepatosplenomegaly or other masses, no guarding or rebound  M/S:   gait steady during exam. severe kyphosis, flexion at waist  NEURO:   Cranial nerves 2-12 are normal tested and grossly at patient's baseline, speech clear  PSYCH:  insight and judgement impaired, occ anxious. does reports ongiong statements of hearing people in hallway, next door taking about her.  currently feels safe in apt.     Labs:   no recent labs    ASSESSMENT/PLAN:  Paranoia (H)  Ongoing s/s at times. Easily agitated at times. Refuses increased staff cares,  referral, cognitive testing. Memory loss due to dementia  1. Monitor for increased freq. Of paranoia  2. Monitor for changes in mood, behaviors  3. Spoke with Supervising RN, will plan to contact [employer] to assess for Vulnerable Adult assessment    Bilateral sensorineural hearing loss wears hearing aides  Sig. Hearing loss. Unclear if contributing to paranoia  1. Remind to keep hearing aids charged  2. Remind to wear hearing aids during the day  3. Use written communication as needed    Acquired postural kyphosis  Flexion at waist.  No recent increase in pain. No recent fall.  Has refused therapies in past  1. Remind to use walker  2. Monitor for unsteady gait, increased furniture walking  3. For above changed will again discuss therapies with resident  4. Monitor for reports of back pain      Electronically signed by:  LAY Wheatley CNP

## 2021-03-22 NOTE — LETTER
3/22/2021        RE: Jewels Wilkes Of [facility]  [address]  [address]  [facility] [address]        [place] [facility]  [place] Medical Record Number:  [id number]  Place of Service where encounter took place:  [facility] [employer]  [employer] ([facility]) [[id number]]  Chief Complaint   Patient presents with     Paranoid       HPI:    Jewels Cortez  is a 85 year old (1935), who is being seen today for an episodic care visit.  HPI information obtained from: facility chart records, facility staff, patient report and [facility] chart review. Today's concern is: paranoia, hearing loss, kyphosis.  Has memory loss due to dementia. Cont To Whom It May Concern have paranoia at times, limited insight into dementia. Per staff, has episodes of increased confusion at times. Has sever hearing loss, somewhat improved when wearing hearing aids.  Reports hearing others talking about her in hallway and next door at times.  Per staff, has called police on occasion due to not feeling safe.  Has refused increased staff assist. Has refused cognitive testing. Today discussed referral to -decline.  Not currently taking meds.  In past, has not wanted staff to manage meds.  Has h/o cervical and lumbar spine surgeries.  Has severe postural kyphoses with flexion at waist.  Does not always use walker. In apt. Has refuses any therapies.  No recent falls.       Past Medical and Surgical History reviewed in Epic today.    MEDICATIONS:    No current outpatient medications on file.         REVIEW OF SYSTEMS:  No chest pain, shortness of breath, fevers, chills, headache, nausea, vomiting, dysuria or bowel abnormalities.  Appetite is  normal.  No pain except occ aches.    Objective:  /71   Pulse 67   Resp 16   SpO2 98%   Exam:  GENERAL APPEARANCE:  Alert, in no distress, cooperative  ENT:  Mouth and posterior oropharynx normal, moist mucous membranes, [place]  EYES:  EOM, conjunctivae, lids, pupils and  irises normal, PERRL  RESP:  respiratory effort and palpation of chest normal, lungs clear to auscultation , no respiratory distress  CV:  Palpation and auscultation of heart done , regular rate and rhythm, no murmur, rub, or gallop, no edema  ABDOMEN:  normal bowel sounds, soft, nontender, no hepatosplenomegaly or other masses, no guarding or rebound  M/S:   gait steady during exam. severe kyphosis, flexion at waist  NEURO:   Cranial nerves 2-12 are normal tested and grossly at patient's baseline, speech clear  PSYCH:  insight and judgement impaired, occ anxious. does reports ongiong statements of hearing people in hallway, next door taking about her.  currently feels safe in apt.     Labs:   no recent labs    ASSESSMENT/PLAN:  Paranoia (H)  Ongoing s/s at times. Easily agitated at times. Refuses increased staff cares,  referral, cognitive testing. Memory loss due to dementia  1. Monitor for increased freq. Of paranoia  2. Monitor for changes in mood, behaviors  3. Spoke with Supervising RN, [facility] Vulnerable [facility]    Bilateral sensorineural hearing loss wears hearing aides  Sig. Hearing loss. Unclear if contributing to paranoia  1. Remind to keep hearing aids charged  2. Remind to wear hearing aids during the day  3. Use written communication as needed    Acquired postural kyphosis  Flexion at waist.  No recent increase in pain. No recent fall.  Has refused therapies in past  1. Remind to use walker  2. Monitor for unsteady gait, increased furniture walking  3. For above changed will again discuss therapies with resident  4. Monitor for reports of back pain      Electronically signed by:  LAY Wheatley CNP                 Sincerely,        LAY Wheatley CNP

## 2021-06-21 ENCOUNTER — ASSISTED LIVING VISIT (OUTPATIENT)
Dept: GERIATRICS | Facility: CLINIC | Age: 86
End: 2021-06-21
Payer: MEDICARE

## 2021-06-21 VITALS
SYSTOLIC BLOOD PRESSURE: 137 MMHG | RESPIRATION RATE: 16 BRPM | HEART RATE: 78 BPM | TEMPERATURE: 97.8 F | OXYGEN SATURATION: 93 % | DIASTOLIC BLOOD PRESSURE: 75 MMHG

## 2021-06-21 DIAGNOSIS — F03.91 DEMENTIA WITH BEHAVIORAL DISTURBANCE, UNSPECIFIED DEMENTIA TYPE: ICD-10-CM

## 2021-06-21 DIAGNOSIS — R07.9 CHEST PAIN, UNSPECIFIED TYPE: Primary | ICD-10-CM

## 2021-06-21 DIAGNOSIS — M40.00 ACQUIRED POSTURAL KYPHOSIS: ICD-10-CM

## 2021-06-21 RX ORDER — ACETAMINOPHEN 500 MG
1000 TABLET ORAL 2 TIMES DAILY
COMMUNITY
End: 2021-07-09

## 2021-06-21 NOTE — PROGRESS NOTES
[place] [facility]  [place] Medical Record Number:  [id number]  Place of Service where encounter took place:  [facility]  [facility] ([facility]) [[id number]]  Chief Complaint   Patient presents with     Pain       HPI:    Jewels Cortez  is a 86 year old (1935), who is being seen today for an episodic care visit.  HPI information obtained from: facility chart records, facility staff, patient report and [facility] chart review. Today's concern is:  Chest pain, kyphosis, dementia.  Per staff, has reported some increase in gen. Weakness past couple days.  No recent falls. Has h/o cervical spine and lumbar spine surg.  Severe postural kyphosis. Gait steady.   Today reports some discomfort over chest. No pain radiating down arm or up jaw. Occ. Cough. Pain present with palp. Of chest. And when standing up.  No chest pain at rest. Has memory loss due to dementia.  No recent reports of paranoia. Mood gen. Stable. No recent reports of functional decline.     Past Medical and Surgical History reviewed in Epic today.    MEDICATIONS:    Current Outpatient Medications   Medication Sig Dispense Refill     acetaminophen (TYLENOL) 500 MG tablet Take 1,000 mg by mouth 2 times daily           REVIEW OF SYSTEMS:  CONSTITUTIONAL:  fatigue and body aches, EYES:  glasses or contacts, ENT:  [place], CV:  neg, RESPIRATORY: occ cough, :  neg, GI:  neg, NEURO:  neg, PSYCH: neg and MUSCULOSKELETAL: some chest pain with palp. change in position     Objective:  /75   Pulse 78   Temp 97.8  F (36.6  C)   Resp 16   SpO2 93%   Exam:  GENERAL APPEARANCE:  Alert, in no distress, thin  ENT:  Mouth and posterior oropharynx normal, moist mucous membranes, [place]  EYES:  EOM, conjunctivae, lids, pupils and irises normal, PERRL  NECK:  No adenopathy,masses or thyromegaly, FROM  RESP:  respiratory effort and palpation of chest normal, lungs clear to auscultation , no respiratory distress, diminished breath sounds bibasilar  CV:   Palpation and auscultation of heart done , regular rate and rhythm, no murmur, rub, or gallop, no edema  ABDOMEN:  normal bowel sounds, soft, nontender, no hepatosplenomegaly or other masses, no guarding or rebound  M/S:   kyphosis, flexed at waist. gait steady. muscle strength 5/5 all 4 ext. some pain with palp. of upper chest.  no brusing to area  NEURO:   Cranial nerves 2-12 are normal tested and grossly at patient's baseline, speech clear  PSYCH:  insight and judgement impaired, memory impaired , affect and mood normal    Labs:   no recent labs    ASSESSMENT/PLAN:  Chest pain, unspecified type  Appears MSK in nature. Pain with palp. And change in position  1. Start bid tylenol  2.check CXR to r/u acute changes  3. Follow O2 sats  4. Follow BPs, HRs  5. Refuses labs at this time    Acquired postural kyphosis  Ongoing. Some gen.weakness past couple days. Gait steady. Chest pain as above  1. Start BID tylenol as above  2. Encourage increased amb. As roseann  3. Monitor for changes in gait, increased LE weakness  4. Refer to PT, OT    Dementia with behavioral disturbance, unspecified dementia type (H)  Memory loss. No recent reports of delusions  1. Monitor for paranoia, delusions  2. Monitor for changes in mood, behavior  3. For fever, dysuria, may check UA/UC  4. Refer to SW      Electronically signed by:  LAY Wheatley CNP

## 2021-06-21 NOTE — LETTER
6/21/2021        RE: Jewels Wilkes Of [facility]  [address]  [address]  [facility] [address]        [place] [facility]  [place] Medical Record Number:  [id number]  Place of Service where encounter took place:  [facility] [employer] [facility] ([facility]) [[id number]]  Chief Complaint   Patient presents with     Pain       HPI:    Jewels Cortez  is a 86 year old (1935), who is being seen today for an episodic care visit.  HPI information obtained from: facility chart records, facility staff, patient report and [facility] chart review. Today's concern is:  Chest pain, kyphosis, dementia.  Per staff, has reported some increase in gem. Weakness past couple days.  No recent falls. Has h/o cervical spine and lumbar spine surg.  Severe postural kyphosis. Gait steady.  Refuses therapy. Today reports some discomfort over chest. No pain radiating down arm or up jaw. Occ. Cough. Pain present with palp. Of chest. And when standing up.  No chest pain at rest. Has memory loss due to dementia.  No recent reports of paranoia. Mood gen. Stable. No recent reports of functional decline.     Past Medical and Surgical History reviewed in Epic today.    MEDICATIONS:    Current Outpatient Medications   Medication Sig Dispense Refill     acetaminophen (TYLENOL) 500 MG tablet Take 1,000 mg by mouth 2 times daily           REVIEW OF SYSTEMS:  CONSTITUTIONAL:  fatigue and body aches, EYES:  glasses or contacts, ENT:  [place], CV:  neg, RESPIRATORY: occ cough, :  neg, GI:  neg, NEURO:  neg, PSYCH: neg and MUSCULOSKELETAL: some chest pain with palp. change in position     Objective:  /75   Pulse 78   Temp 97.8  F (36.6  C)   Resp 16   SpO2 93%   Exam:  GENERAL APPEARANCE:  Alert, in no distress, thin  ENT:  Mouth and posterior oropharynx normal, moist mucous membranes, [place]  EYES:  EOM, conjunctivae, lids, pupils and irises normal, PERRL  NECK:  No adenopathy,masses or thyromegaly, FROM  RESP:  respiratory  effort and palpation of chest normal, lungs clear to auscultation , no respiratory distress, diminished breath sounds bibasilar  CV:  Palpation and auscultation of heart done , regular rate and rhythm, no murmur, rub, or gallop, no edema  ABDOMEN:  normal bowel sounds, soft, nontender, no hepatosplenomegaly or other masses, no guarding or rebound  M/S:   kyphosis, flexed at waist. gait steady. muscle strength 5/5 all 4 ext. some pain with palp. of upper chest.  no brusing to area  NEURO:   Cranial nerves 2-12 are normal tested and grossly at patient's baseline, speech clear  PSYCH:  insight and judgement impaired, memory impaired , affect and mood normal    Labs:   no recent labs    ASSESSMENT/PLAN:  Chest pain, unspecified type  Appears MSK in nature. Pain with palp. And change in position  1. Start bid tylenol  2.check CXR to r/u acute changes  3. Follow O2 sats  4. Follow BPs, HRs  5. Refuses labs at this time    Acquired postural kyphosis  Ongoing. Some gen.weakness past couple days. Gait steady. Chest pain as above  1. Start BID tylenol as above  2. Encourage increased amb. As roseann  3. Monitor for changes in gait, increased LE weakness  4. Refuses PT at this time    Dementia with behavioral disturbance, unspecified dementia type (H)  Memory loss. No recent reports of delusions  1. Monitor for paranoia, delusions  2. Monitor for changes in mood, behavior  3. For fever, dysuria, may check UA/UC      Electronically signed by:  LAY Wheatley CNP                 Sincerely,        LAY Wheatley CNP

## 2021-06-22 ENCOUNTER — TELEPHONE (OUTPATIENT)
Dept: GERIATRICS | Facility: CLINIC | Age: 86
End: 2021-06-22

## 2021-06-22 DIAGNOSIS — J18.9 PNEUMONIA OF LEFT LOWER LOBE DUE TO INFECTIOUS ORGANISM: Primary | ICD-10-CM

## 2021-06-22 RX ORDER — AZITHROMYCIN 250 MG/1
TABLET, FILM COATED ORAL
Qty: 6 TABLET | Refills: 0 | Status: SHIPPED | OUTPATIENT
Start: 2021-06-22 | End: 2021-06-27

## 2021-06-28 ENCOUNTER — ASSISTED LIVING VISIT (OUTPATIENT)
Dept: GERIATRICS | Facility: CLINIC | Age: 86
End: 2021-06-28
Payer: MEDICARE

## 2021-06-28 VITALS
DIASTOLIC BLOOD PRESSURE: 67 MMHG | SYSTOLIC BLOOD PRESSURE: 110 MMHG | TEMPERATURE: 97.4 F | HEART RATE: 78 BPM | OXYGEN SATURATION: 96 % | RESPIRATION RATE: 18 BRPM

## 2021-06-28 DIAGNOSIS — R52 PAIN: ICD-10-CM

## 2021-06-28 DIAGNOSIS — J18.9 PNEUMONIA OF LEFT LOWER LOBE DUE TO INFECTIOUS ORGANISM: Primary | ICD-10-CM

## 2021-06-28 DIAGNOSIS — F03.91 DEMENTIA WITH BEHAVIORAL DISTURBANCE, UNSPECIFIED DEMENTIA TYPE: ICD-10-CM

## 2021-06-28 NOTE — PROGRESS NOTES
[place] [facility]  [place] Medical Record Number:  [id number]  Place of Service where encounter took place:  [facility]  [facility] ([facility]) [[id number]]  Chief Complaint   Patient presents with     Cough       HPI:    Jewels Cortez  is a 86 year old (1935), who is being seen today for an episodic care visit.  HPI information obtained from: facility chart records, facility staff, patient report and [facility] chart review. Today's concern is: pneumonia, pain, dementia. 6/21/21 reported chest pain which was exacerbated with movement, palp. Of chest. No recent falls.  Has sever kyphosis, flexion at waist.  CXR done which showed possible healing rib fx, LLL pneumonia.  Completed zpak, started on tylenol.  Reports pain much improved.  Still some remaining pain with rotation of torso. Afebrile. O2 sats stable.  Refused initial PT eval.  Has memory loss due to dementia.  No current paranoia.  Does have referral for .       Past Medical and Surgical History reviewed in Epic today.    MEDICATIONS:    Current Outpatient Medications   Medication Sig Dispense Refill     acetaminophen (TYLENOL) 500 MG tablet Take 1,000 mg by mouth 2 times daily           REVIEW OF SYSTEMS:  Unobtainable secondary to cognitive impairment. Reports some pain over chest area with rotation of torso    Objective:  /67   Pulse 78   Temp 97.4  F (36.3  C)   Resp 18   SpO2 96%   Exam:  GENERAL APPEARANCE:  Alert, in no distress, cooperative  ENT:  Mouth and posterior oropharynx normal, moist mucous membranes, [place]  EYES:  EOM, conjunctivae, lids, pupils and irises normal, PERRL  NECK:  No adenopathy,masses or thyromegaly, no carotid bruit  RESP:  respiratory effort and palpation of chest normal, lungs clear to auscultation , no respiratory distress, diminished breath sounds bibasilar  CV:  Palpation and auscultation of heart done , regular rate and rhythm, no murmur, rub, or gallop, no edema  ABDOMEN:  normal  bowel sounds, soft, nontender, no hepatosplenomegaly or other masses, no guarding or rebound  M/S:   muscle strength 5/5 all 4 ext. kyphosis with flexed posture. gait steady today.  some increase in gen. mobility compared with previous visit  NEURO:   Cranial nerves 2-12 are normal tested and grossly at patient's baseline, speech clear  PSYCH:  insight and judgement impaired, memory impaired , affect and mood normal    Labs:   Recent labs in [facility] reviewed by me today.     ASSESSMENT/PLAN:  Pneumonia of left lower lobe due to infectious organism  Afebrile, resp. Status stable. Completed zpak  1. Follow temps, O2 sats  2. Monitor for coughing  3. Encourage sitting up, amb. throughout the day  4. Monitor for sob, wheezing    Pain  Gen. Improved.   1. Cont. Tylenol  2. Agreed to meet with PT tomorrow  3. Monitor for further increase in pain  4. Monitor for balance issues, falls    Dementia with behavioral disturbance, unspecified dementia type (H)  Memory loss. No recent reports of paranoia.  Refusal of services at times  1. Monitor for paranoia  2. Refer to SW  3. Monitor for increased anxiety  4. Monitor for confusion, changes in mood, behaviors, s/s UTI      Electronically signed by:  LAY Wheatley CNP

## 2021-06-28 NOTE — LETTER
6/28/2021        RE: [facility]  [address]  [address]  [facility] [place] [facility] [address]        [place] [facility]  [place] [facility] Record Number:  [id number]  Place of Service where encounter took place:  [facility]  [employer] ([facility]) [[id number]]  Chief Complaint   Patient presents with     Cough       HPI:    Jewels Cortez  is a 86 year old (1935), who is being seen today for an episodic care visit.  HPI information obtained from: facility chart records, facility staff, patient report and [facility] chart review. Today's concern is: pneumonia, pain, dementia. 6/21/21 reported chest pain which was exacerbated with movement, palp. Of chest. No recent falls.  Has sever kyphosis, flexion at waist.  CXR done which showed possible healing rib fx, LLL pneumonia.  Completed zpak, started on tylenol.  Reports pain much improved.  Still some remaining pain with rotation of torso. Afebrile. O2 sats stable.  Refused initial PT eval.  Has memory loss due to dementia.  No current paranoia.  Does have referral for .       Past Medical and Surgical History reviewed in Epic today.    MEDICATIONS:    Current Outpatient Medications   Medication Sig Dispense Refill     acetaminophen (TYLENOL) 500 MG tablet Take 1,000 mg by mouth 2 times daily           REVIEW OF SYSTEMS:  Unobtainable secondary to cognitive impairment. Reports some pain over chest area with rotation of torso    Objective:  /67   Pulse 78   Temp 97.4  F (36.3  C)   Resp 18   SpO2 96%   Exam:  GENERAL APPEARANCE:  Alert, in no distress, cooperative  ENT:  Mouth and posterior oropharynx normal, moist mucous membranes, [place]  EYES:  EOM, conjunctivae, lids, pupils and irises normal, PERRL  NECK:  No adenopathy,masses or thyromegaly, no carotid bruit  RESP:  respiratory effort and palpation of chest normal, lungs clear to auscultation , no respiratory distress, diminished breath sounds bibasilar  CV:  Palpation  and auscultation of heart done , regular rate and rhythm, no murmur, rub, or gallop, no edema  ABDOMEN:  normal bowel sounds, soft, nontender, no hepatosplenomegaly or other masses, no guarding or rebound  M/S:   muscle strength 5/5 all 4 ext. kyphosis with flexed posture. gait steady today.  some increase in gen. mobility compared with previous visit  NEURO:   Cranial nerves 2-12 are normal tested and grossly at patient's baseline, speech clear  PSYCH:  insight and judgement impaired, memory impaired , affect and mood normal    Labs:   Recent labs in [facility] reviewed by me today.     ASSESSMENT/PLAN:  Pneumonia of left lower lobe due to infectious organism  Afebrile, resp. Status stable. Completed zpak  1. Follow temps, O2 sats  2. Monitor for coughing  3. Encourage sitting up, amb. throughout the day  4. Monitor for sob, wheezing    Pain  Gen. Improved.   1. Cont. Tylenol  2. Agreed to meet with PT tomorrow  3. Monitor for further increase in pain  4. Monitor for balance issues, falls    Dementia with behavioral disturbance, unspecified dementia type (H)  Memory loss. No recent reports of paranoia.  Refusal of services at times  1. Monitor for paranoia  2. Refer to SW  3. Monitor for increased anxiety  4. Monitor for confusion, changes in mood, behaviors, s/s UTI      Electronically signed by:  LAY Wheatley CNP                 Sincerely,        LAY Wheatley CNP

## 2021-07-09 DIAGNOSIS — R52 PAIN: Primary | ICD-10-CM

## 2021-07-09 RX ORDER — PSEUDOEPHED/ACETAMINOPH/DIPHEN 30MG-500MG
TABLET ORAL
Qty: 112 TABLET | Status: SHIPPED | OUTPATIENT
Start: 2021-07-09 | End: 2021-12-14

## 2021-09-12 ENCOUNTER — LAB REQUISITION (OUTPATIENT)
Dept: LAB | Facility: CLINIC | Age: 86
End: 2021-09-12
Payer: MEDICARE

## 2021-09-12 ENCOUNTER — TELEPHONE (OUTPATIENT)
Dept: GERIATRICS | Facility: CLINIC | Age: 86
End: 2021-09-12

## 2021-09-12 DIAGNOSIS — R39.9 UNSPECIFIED SYMPTOMS AND SIGNS INVOLVING THE GENITOURINARY SYSTEM: ICD-10-CM

## 2021-09-12 LAB
ALBUMIN UR-MCNC: 10 MG/DL
APPEARANCE UR: CLEAR
BACTERIA #/AREA URNS HPF: ABNORMAL /HPF
BILIRUB UR QL STRIP: NEGATIVE
COLOR UR AUTO: ABNORMAL
GLUCOSE UR STRIP-MCNC: NEGATIVE MG/DL
HGB UR QL STRIP: NEGATIVE
KETONES UR STRIP-MCNC: NEGATIVE MG/DL
LEUKOCYTE ESTERASE UR QL STRIP: ABNORMAL
MUCOUS THREADS #/AREA URNS LPF: PRESENT /LPF
NITRATE UR QL: NEGATIVE
PH UR STRIP: 5 [PH] (ref 5–7)
RBC URINE: 2 /HPF
SP GR UR STRIP: 1.03 (ref 1–1.03)
SQUAMOUS EPITHELIAL: 1 /HPF
UROBILINOGEN UR STRIP-MCNC: <2 MG/DL
WBC URINE: 9 /HPF

## 2021-09-12 PROCEDURE — 81001 URINALYSIS AUTO W/SCOPE: CPT | Mod: ORL | Performed by: NURSE PRACTITIONER

## 2021-09-12 PROCEDURE — 87086 URINE CULTURE/COLONY COUNT: CPT | Mod: ORL | Performed by: NURSE PRACTITIONER

## 2021-09-12 NOTE — TELEPHONE ENCOUNTER
[place] [facility] TRIAGE ENCOUNTER    Chief Complaint   Patient presents with     Medication Question       Jewels Cortez is a 86 year old  (1935), Nurse called today to report: Per the nurse the patient was supposed to obtain urine specimen then start cipro. She started cipro yesterday but did not allow staff to get sample    ASSESSMENT/PLAN  Please obtain urine sample and continue cipro treatment that was already started per french    Electronically signed by:   Lizzie Stoll, NP

## 2021-09-13 ENCOUNTER — ASSISTED LIVING VISIT (OUTPATIENT)
Dept: GERIATRICS | Facility: CLINIC | Age: 86
End: 2021-09-13
Payer: MEDICARE

## 2021-09-13 VITALS
TEMPERATURE: 97.2 F | RESPIRATION RATE: 18 BRPM | OXYGEN SATURATION: 98 % | HEART RATE: 66 BPM | SYSTOLIC BLOOD PRESSURE: 124 MMHG | DIASTOLIC BLOOD PRESSURE: 77 MMHG

## 2021-09-13 DIAGNOSIS — M40.00 ACQUIRED POSTURAL KYPHOSIS: ICD-10-CM

## 2021-09-13 DIAGNOSIS — F03.91 DEMENTIA WITH BEHAVIORAL DISTURBANCE, UNSPECIFIED DEMENTIA TYPE: ICD-10-CM

## 2021-09-13 DIAGNOSIS — N30.00 ACUTE CYSTITIS WITHOUT HEMATURIA: Primary | ICD-10-CM

## 2021-09-13 LAB — BACTERIA UR CULT: NO GROWTH

## 2021-09-13 RX ORDER — CIPROFLOXACIN 250 MG/1
250 TABLET, FILM COATED ORAL 2 TIMES DAILY
COMMUNITY
Start: 2021-09-10 | End: 2021-09-17

## 2021-09-13 NOTE — PROGRESS NOTES
[place] [facility]  [place] Medical Record Number:  [id number]  Place of Service where encounter took place:  [facility]  [facility] ([facility]) [[id number]]  Chief Complaint   Patient presents with     UTI       HPI:    Jewels Cortez  is a 86 year old (1935), who is being seen today for an episodic care visit.  HPI information obtained from: facility chart records, facility staff, patient report and [facility] chart review. Today's concern is: UTI, dementia, kyphosis. Per niece, had delusions. Has h/o UTIs. Afebrile.  UA done.  Started on cipro.  UC pend.  Has memory loss due to dementia. H/o delusions, paranoia. Very [place]. Does not want assist with ADLs or personal affairs.  Has decline Social Service in past. No recent falls. Has sever kyphosis.  Pneumonia earlier this year, chest pain, L neck pain a couple mos ago-both resolved.  Cont. On bid tylenol.       Past Medical and Surgical History reviewed in Epic today.    MEDICATIONS:    Current Outpatient Medications   Medication Sig Dispense Refill     ciprofloxacin (CIPRO) 250 MG tablet Take 250 mg by mouth 2 times daily       ACETAMINOPHEN EXTRA STRENGTH 500 MG tablet TAKE TWO TABLETS (1000MG) BY MOUTH TWICE DAILY 112 tablet PRN         REVIEW OF SYSTEMS:  No chest pain, shortness of breath, fevers, chills, headache, nausea, vomiting, dysuria or bowel abnormalities.  Appetite is  normal.  No pain except occ back.    Objective:  /77   Pulse 66   Temp 97.2  F (36.2  C)   Resp 18   SpO2 98%   Exam:  GENERAL APPEARANCE:  Alert, in no distress, cooperative  ENT:  Mouth and posterior oropharynx normal, moist mucous membranes, [place]  EYES:  EOM, conjunctivae, lids, pupils and irises normal, PERRL  NECK:  FROM, no carotid bruit  RESP:  respiratory effort and palpation of chest normal, lungs clear to auscultation , no respiratory distress  CV:  Palpation and auscultation of heart done , regular rate and rhythm, no murmur, rub, or gallop, no  edema  ABDOMEN:  normal bowel sounds, soft, nontender, no hepatosplenomegaly or other masses, no guarding or rebound  M/S:   gait steady sig flexion at waist due to kyphosis.  muscle strength 5/5 all 4 ext.  NEURO:   Cranial nerves 2-12 are normal tested and grossly at patient's baseline, no tremor, speech clear  PSYCH:  insight and judgement impaired, memory impaired , affect and mood normal    Labs:   Recent labs in [facility] reviewed by me today.     ASSESSMENT/PLAN:  (N30.00) Acute cystitis without hematuria  (primary encounter diagnosis)  Comment: some increase in delusions, paranoia. No apparent psychoses during exam  Plan: 1. Await UC results  2. Complete antbx course for UTI  3. Monitor for fever, dysuria  4. Encourage fluids    (F03.91) Dementia with behavioral disturbance, unspecified dementia type (H)  Comment: memory loss. Some recent increase in delusions, paranoia  Plan: 1. tx UTI as above  2. Remind to wear hearing aids-very [place], difficulty, communicating  3. Monitor for increase need of assist with ADLs  4. 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 No recent falls. Pneumonia resolved. No current back, neck, chest pain  Plan: 1. Cont. Tylenol  2. Monitor for reports of increased back pain-may consider topical pain med  3. Monitor for changes in gait, falls, LE weakness    Electronically signed by:  LAY Wheatley CNP

## 2021-09-13 NOTE — LETTER
9/13/2021        RE: [facility]  [address]  [address]  [facility] [address]        [place] [facility]  [place] [facility] Record Number:  [id number]  Place of Service where encounter took place:  [facility] [employer]  [facility] ([facility]) [[id number]]  Chief Complaint   Patient presents with     UTI       HPI:    Jewels Cortez  is a 86 year old (1935), who is being seen today for an episodic care visit.  HPI information obtained from: facility chart records, facility staff, patient report and [facility] chart review. Today's concern is: UTI, dementia, kyphosis. Per niece, had delusions. Has h/o UTIs. Afebrile.  UA done.  Started on cipro.  UC pend.  Has memory loss due to dementia. H/o delusions, paranoia. Very [place]. Does not want assist with ADLs or personal affairs.  Has decline Social Service in past. No recent falls. Has sever kyphosis.  Pneumonia earlier this year, chest pain, L neck pain a couple mos ago-both resolved.  Cont. On bid tylenol.       Past Medical and Surgical History reviewed in Epic today.    MEDICATIONS:    Current Outpatient Medications   Medication Sig Dispense Refill     ciprofloxacin (CIPRO) 250 MG tablet Take 250 mg by mouth 2 times daily       ACETAMINOPHEN EXTRA STRENGTH 500 MG tablet TAKE TWO TABLETS (1000MG) BY MOUTH TWICE DAILY 112 tablet PRN         REVIEW OF SYSTEMS:  No chest pain, shortness of breath, fevers, chills, headache, nausea, vomiting, dysuria or bowel abnormalities.  Appetite is  normal.  No pain except occ back.    Objective:  /77   Pulse 66   Temp 97.2  F (36.2  C)   Resp 18   SpO2 98%   Exam:  GENERAL APPEARANCE:  Alert, in no distress, cooperative  ENT:  Mouth and posterior oropharynx normal, moist mucous membranes, [place]  EYES:  EOM, conjunctivae, lids, pupils and irises normal, PERRL  NECK:  FROM, no carotid bruit  RESP:  respiratory effort and palpation of chest normal, lungs clear to auscultation , no  respiratory distress  CV:  Palpation and auscultation of heart done , regular rate and rhythm, no murmur, rub, or gallop, no edema  ABDOMEN:  normal bowel sounds, soft, nontender, no hepatosplenomegaly or other masses, no guarding or rebound  M/S:   gait steady sig flexion at waist due to kyphosis.  muscle strength 5/5 all 4 ext.  NEURO:   Cranial nerves 2-12 are normal tested and grossly at patient's baseline, no tremor, speech clear  PSYCH:  insight and judgement impaired, memory impaired , affect and mood normal    Labs:   Recent labs in [facility] reviewed by me today.     ASSESSMENT/PLAN:  (N30.00) Acute cystitis without hematuria  (primary encounter diagnosis)  Comment: some increase in delusions, paranoia. No apparent psychoses during exam  Plan: 1. Await UC results  2. Complete antbx course for UTI  3. Monitor for fever, dysuria  4. Encourage fluids    (F03.91) Dementia with behavioral disturbance, unspecified dementia type (H)  Comment: memory loss. Some recent increase in delusions, paranoia  Plan: 1. tx UTI as above  2. Remind to wear hearing aids-very [place], difficulty, communicating  3. Monitor for increase need of assist with ADLs  4. 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 No recent falls. Pneumonia resolved. No current back, neck, chest pain  Plan: 1. Cont. Tylenol  2. Monitor for reports of increased back pain-may consider topical pain med  3. Monitor for changes in gait, falls, LE weakness    Electronically signed by:  LAY Wheatley CNP                 Sincerely,        LAY Wheatley CNP

## 2021-12-13 ENCOUNTER — APPOINTMENT (OUTPATIENT)
Dept: GENERAL RADIOLOGY | Facility: CLINIC | Age: 86
End: 2021-12-13
Attending: EMERGENCY MEDICINE
Payer: MEDICARE

## 2021-12-13 ENCOUNTER — TRANSFERRED RECORDS (OUTPATIENT)
Dept: HEALTH INFORMATION MANAGEMENT | Facility: CLINIC | Age: 86
End: 2021-12-13

## 2021-12-13 ENCOUNTER — ASSISTED LIVING VISIT (OUTPATIENT)
Dept: GERIATRICS | Facility: CLINIC | Age: 86
End: 2021-12-13
Payer: MEDICARE

## 2021-12-13 ENCOUNTER — APPOINTMENT (OUTPATIENT)
Dept: CT IMAGING | Facility: CLINIC | Age: 86
End: 2021-12-13
Attending: EMERGENCY MEDICINE
Payer: MEDICARE

## 2021-12-13 ENCOUNTER — HOSPITAL ENCOUNTER (OUTPATIENT)
Facility: CLINIC | Age: 86
Setting detail: OBSERVATION
LOS: 1 days | Discharge: NURSING, REHAB OR LONG-TERM CARE | End: 2021-12-20
Attending: EMERGENCY MEDICINE | Admitting: EMERGENCY MEDICINE
Payer: MEDICARE

## 2021-12-13 VITALS
SYSTOLIC BLOOD PRESSURE: 150 MMHG | RESPIRATION RATE: 16 BRPM | DIASTOLIC BLOOD PRESSURE: 66 MMHG | OXYGEN SATURATION: 94 % | HEART RATE: 68 BPM

## 2021-12-13 DIAGNOSIS — M25.532 LEFT WRIST PAIN: ICD-10-CM
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
## 2021-12-13 DIAGNOSIS — F03.90 DEMENTIA WITHOUT BEHAVIORAL DISTURBANCE, UNSPECIFIED DEMENTIA TYPE: ICD-10-CM
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
## 2021-12-13 DIAGNOSIS — R41.0 CONFUSION: ICD-10-CM

## 2021-12-13 DIAGNOSIS — F03.91 DEMENTIA WITH BEHAVIORAL DISTURBANCE, UNSPECIFIED DEMENTIA TYPE: ICD-10-CM

## 2021-12-13 DIAGNOSIS — I48.91 ATRIAL FIBRILLATION, UNSPECIFIED TYPE (H): ICD-10-CM

## 2021-12-13 DIAGNOSIS — S62.102A WRIST FRACTURE, LEFT, CLOSED, INITIAL ENCOUNTER: ICD-10-CM

## 2021-12-13 LAB
ALBUMIN UR-MCNC: 10 MG/DL
ANION GAP SERPL CALCULATED.3IONS-SCNC: 6 MMOL/L (ref 3–14)
APPEARANCE UR: CLEAR
BASOPHILS # BLD AUTO: 0 10E3/UL (ref 0–0.2)
BASOPHILS NFR BLD AUTO: 0 %
BILIRUB UR QL STRIP: NEGATIVE
BUN SERPL-MCNC: 19 MG/DL (ref 7–30)
CALCIUM SERPL-MCNC: 8.6 MG/DL (ref 8.5–10.1)
CHLORIDE BLD-SCNC: 110 MMOL/L (ref 94–109)
CO2 SERPL-SCNC: 25 MMOL/L (ref 20–32)
COLOR UR AUTO: ABNORMAL
CREAT SERPL-MCNC: 0.54 MG/DL (ref 0.52–1.04)
EOSINOPHIL # BLD AUTO: 0 10E3/UL (ref 0–0.7)
EOSINOPHIL NFR BLD AUTO: 0 %
ERYTHROCYTE [DISTWIDTH] IN BLOOD BY AUTOMATED COUNT: 12.6 % (ref 10–15)
GFR SERPL CREATININE-BSD FRML MDRD: 86 ML/MIN/1.73M2
GLUCOSE BLD-MCNC: 135 MG/DL (ref 70–99)
GLUCOSE UR STRIP-MCNC: 50 MG/DL
HCT VFR BLD AUTO: 36.6 % (ref 35–47)
HGB BLD-MCNC: 11.7 G/DL (ref 11.7–15.7)
HGB UR QL STRIP: NEGATIVE
IMM GRANULOCYTES # BLD: 0.1 10E3/UL
IMM GRANULOCYTES NFR BLD: 1 %
KETONES UR STRIP-MCNC: NEGATIVE MG/DL
LEUKOCYTE ESTERASE UR QL STRIP: ABNORMAL
LYMPHOCYTES # BLD AUTO: 0.8 10E3/UL (ref 0.8–5.3)
LYMPHOCYTES NFR BLD AUTO: 8 %
MCH RBC QN AUTO: 30.4 PG (ref 26.5–33)
MCHC RBC AUTO-ENTMCNC: 32 G/DL (ref 31.5–36.5)
MCV RBC AUTO: 95 FL (ref 78–100)
MONOCYTES # BLD AUTO: 0.7 10E3/UL (ref 0–1.3)
MONOCYTES NFR BLD AUTO: 7 %
MUCOUS THREADS #/AREA URNS LPF: PRESENT /LPF
NEUTROPHILS # BLD AUTO: 9 10E3/UL (ref 1.6–8.3)
NEUTROPHILS NFR BLD AUTO: 84 %
NITRATE UR QL: NEGATIVE
NRBC # BLD AUTO: 0 10E3/UL
NRBC BLD AUTO-RTO: 0 /100
PH UR STRIP: 6.5 [PH] (ref 5–7)
PLATELET # BLD AUTO: 301 10E3/UL (ref 150–450)
POTASSIUM BLD-SCNC: 3.5 MMOL/L (ref 3.4–5.3)
RBC # BLD AUTO: 3.85 10E6/UL (ref 3.8–5.2)
RBC URINE: 1 /HPF
SARS-COV-2 RNA RESP QL NAA+PROBE: NEGATIVE
SODIUM SERPL-SCNC: 141 MMOL/L (ref 133–144)
SP GR UR STRIP: 1.02 (ref 1–1.03)
SQUAMOUS EPITHELIAL: 1 /HPF
TROPONIN I SERPL HS-MCNC: 10 NG/L
UROBILINOGEN UR STRIP-MCNC: NORMAL MG/DL
WBC # BLD AUTO: 10.7 10E3/UL (ref 4–11)
WBC URINE: 9 /HPF

## 2021-12-13 PROCEDURE — C9803 HOPD COVID-19 SPEC COLLECT: HCPCS

## 2021-12-13 PROCEDURE — 81001 URINALYSIS AUTO W/SCOPE: CPT | Performed by: EMERGENCY MEDICINE

## 2021-12-13 PROCEDURE — 87635 SARS-COV-2 COVID-19 AMP PRB: CPT | Performed by: EMERGENCY MEDICINE

## 2021-12-13 PROCEDURE — 29125 APPL SHORT ARM SPLINT STATIC: CPT | Mod: LT

## 2021-12-13 PROCEDURE — 80048 BASIC METABOLIC PNL TOTAL CA: CPT | Performed by: EMERGENCY MEDICINE

## 2021-12-13 PROCEDURE — 96375 TX/PRO/DX INJ NEW DRUG ADDON: CPT | Mod: 59

## 2021-12-13 PROCEDURE — 70450 CT HEAD/BRAIN W/O DYE: CPT | Mod: MG

## 2021-12-13 PROCEDURE — 250N000011 HC RX IP 250 OP 636: Performed by: EMERGENCY MEDICINE

## 2021-12-13 PROCEDURE — 96372 THER/PROPH/DIAG INJ SC/IM: CPT | Performed by: EMERGENCY MEDICINE

## 2021-12-13 PROCEDURE — 73110 X-RAY EXAM OF WRIST: CPT | Mod: LT

## 2021-12-13 PROCEDURE — 36415 COLL VENOUS BLD VENIPUNCTURE: CPT | Performed by: EMERGENCY MEDICINE

## 2021-12-13 PROCEDURE — 85025 COMPLETE CBC W/AUTO DIFF WBC: CPT | Performed by: EMERGENCY MEDICINE

## 2021-12-13 PROCEDURE — 87086 URINE CULTURE/COLONY COUNT: CPT | Performed by: INTERNAL MEDICINE

## 2021-12-13 PROCEDURE — 99219 PR INITIAL OBSERVATION CARE,LEVEL II: CPT | Performed by: INTERNAL MEDICINE

## 2021-12-13 PROCEDURE — 96365 THER/PROPH/DIAG IV INF INIT: CPT | Mod: 59

## 2021-12-13 PROCEDURE — 71045 X-RAY EXAM CHEST 1 VIEW: CPT

## 2021-12-13 PROCEDURE — 84484 ASSAY OF TROPONIN QUANT: CPT | Performed by: EMERGENCY MEDICINE

## 2021-12-13 PROCEDURE — G0378 HOSPITAL OBSERVATION PER HR: HCPCS

## 2021-12-13 PROCEDURE — 96366 THER/PROPH/DIAG IV INF ADDON: CPT | Mod: 59

## 2021-12-13 PROCEDURE — 99285 EMERGENCY DEPT VISIT HI MDM: CPT | Mod: 25

## 2021-12-13 PROCEDURE — 93005 ELECTROCARDIOGRAM TRACING: CPT | Mod: 59

## 2021-12-13 RX ORDER — HALOPERIDOL 5 MG/ML
INJECTION INTRAMUSCULAR
Status: DISCONTINUED
Start: 2021-12-13 | End: 2021-12-14 | Stop reason: HOSPADM

## 2021-12-13 RX ORDER — CEFTRIAXONE 2 G/1
2 INJECTION, POWDER, FOR SOLUTION INTRAMUSCULAR; INTRAVENOUS ONCE
Status: COMPLETED | OUTPATIENT
Start: 2021-12-13 | End: 2021-12-13

## 2021-12-13 RX ORDER — LORAZEPAM 2 MG/ML
1 INJECTION INTRAMUSCULAR ONCE
Status: COMPLETED | OUTPATIENT
Start: 2021-12-13 | End: 2021-12-13

## 2021-12-13 RX ORDER — HALOPERIDOL 5 MG/ML
10 INJECTION INTRAMUSCULAR ONCE
Status: COMPLETED | OUTPATIENT
Start: 2021-12-13 | End: 2021-12-13

## 2021-12-13 RX ADMIN — LORAZEPAM 1 MG: 2 INJECTION INTRAMUSCULAR; INTRAVENOUS at 20:57

## 2021-12-13 RX ADMIN — CEFTRIAXONE 2 G: 2 INJECTION, POWDER, FOR SOLUTION INTRAMUSCULAR; INTRAVENOUS at 18:50

## 2021-12-13 RX ADMIN — HALOPERIDOL LACTATE 10 MG: 5 INJECTION, SOLUTION INTRAMUSCULAR at 17:23

## 2021-12-13 ASSESSMENT — ENCOUNTER SYMPTOMS
HEADACHES: 0
CONFUSION: 1
NECK PAIN: 0
BACK PAIN: 0

## 2021-12-13 NOTE — PROGRESS NOTES
[place] [facility]  [place] [facility] Record Number:  [id number]  Place of Service where encounter took place:  [facility]  [facility] ([facility]) [[id number]]  Chief Complaint   Patient presents with     Fall       HPI:    Jewels Cortez  is a 86 year old (1935), who is being seen today for an episodic care visit.  HPI information obtained from: facility chart records, facility staff, patient report and [facility] chart review. Today's concern is: fall, L wrist pain, dementia.  This am, found lying on floor next to elevator. Fall not witnessed.  Denies hitting head.  Does reports L wrist pain. Has dementia with memory loss.  No recent reports of delusions, paranoia.  Some increase in confusion s/p fall. Has h/o UTIs. Uses cane for amb.  Has refused PT in past. Has severe kyphosis.        Past Medical and Surgical History reviewed in Epic today.    MEDICATIONS:    Current Outpatient Medications   Medication Sig Dispense Refill     ACETAMINOPHEN EXTRA STRENGTH 500 MG tablet TAKE TWO TABLETS (1000MG) BY MOUTH TWICE DAILY 112 tablet PRN         REVIEW OF SYSTEMS:  Unobtainable secondary to cognitive impairment. Reports L wrist pain    Objective:  BP (!) 150/66   Pulse 68   Resp 16   SpO2 94%   Exam:  GENERAL APPEARANCE:  Alert, in no distress, cooperative  ENT:  Mouth and posterior oropharynx normal, moist mucous membranes, [place]  EYES:  EOM, conjunctivae, lids, pupils and irises normal, PERRL  NECK:  FROM  RESP:  respiratory effort and palpation of chest normal, lungs clear to auscultation , no respiratory distress  CV:  Palpation and auscultation of heart done , regular rate and rhythm, no murmur, rub, or gallop, no edema  ABDOMEN:  normal bowel sounds, soft, nontender, no hepatosplenomegaly or other masses, no guarding or rebound  M/S:   severe kyphosis. no apparent pain with PROM LEs, or UEs, except pain with palp, ROM L wrist, sl edema to area  NEURO:   Cranial nerves 2-12 are normal tested  and grossly at patient's baseline, speech clear, no tremor  PSYCH:  insight and judgement impaired, memory impaired , affect and mood normal    Labs:   No recent    ASSESSMENT/PLAN:  (W19.XXXA) Fall, initial encounter  (primary encounter diagnosis)  Comment: unwitnessed. Neuros stable. Gait appears steady with cane. L wrist pain.  Some confusion present  Plan: 1. Monitor for changes in Neuros, no visible signs of trauma of head  2. Monitor for changes in gait  3. Monitor for decreased mobility, increased difficulties with ADLs due to L wrist pain  4. UA/UC r/o UTI    (M25.532) Left wrist pain  Comment: s/p fall  Plan: 1. XR L wrist  2. Cont. Tylenol  3. Refer to [facility] as needed    (F03.91) Dementia with behavioral disturbance, unspecified dementia type (H)  Comment: memory loss. some increase in confusion  Plan: 1. UA, UC as above  2. Monitor for delusions, paranoia    Electronically signed by:  LAY Wheatley CNP

## 2021-12-13 NOTE — ED PROVIDER NOTES
History   Chief Complaint:  Wrist Pain and Altered Mental Status    The history is provided by the EMS personnel, the patient and a relative.      Jewels Cortez is a 86 year old female with history of dementia who presents with wrist pain and altered mental status. Per report, at 1100 today, at the patient's assisted care facility, she tripped over a cord and fell onto her left arm. Since then, she has been experiencing left wrist pain and has had trouble moving her left fingers. She denies any head trauma or loss of consciousness. Due to her wrist pain she underwent an XR at her assisted care facility which showed her left wrist was fractured. Additionally, her niece states the patient has been becoming progressively confused over the past two years. Her confusion has become significantly worse over the past two months. Prior to her fall today, she was wondering around the ahlls which is abnormal for her. She has not had any recent illnesses. Secondary to these symptoms EMS was called and she presented here. Here she states she was sent here by her doctor. denies any back pain, abdominal pain, leg pain, hip pain or headache. Of note, she was scheduled to get her Covid booster today, but missed her appointment. She has no known advanced directives.     Review of Systems   Musculoskeletal: Negative for back pain and neck pain.        Left wrist pain  No hip pain  No leg pain   Neurological: Negative for headaches.        No loss of consciousness   Psychiatric/Behavioral: Positive for confusion.   All other systems reviewed and are negative.    Allergies:  Penicillins  Sulfa Drugs    Medications:  No current out patient medications.    Past Medical History:    Acquired postural kyphosis  Bilateral sensorineural hearing loss  Cancer  Cataract  Dementia   Onychomycosis due to dermatophyte  Recurrent falls     Past Surgical History:    Bladder lift  Cataract, bilateral  Cervical neck   Hysterectomy  Lumbar back  Skin  cancer removal of earlobe and face    Social History:  The patient was accompanied to the ED by EMS.    Physical Exam     Patient Vitals for the past 24 hrs:   BP Temp Temp src Pulse Resp SpO2   12/13/21 2130 92/47 -- -- 87 -- 94 %   12/13/21 2115 95/57 -- -- 67 -- 94 %   12/13/21 2100 112/55 -- -- 92 -- 93 %   12/13/21 1930 128/54 -- -- 70 -- 97 %   12/13/21 1603 (!) 140/63 99.8  F (37.7  C) Oral 80 20 100 %       Physical Exam  VS: Reviewed per above  HENT: No external signs of head trauma.  EYES: sclera anicteric  CV: Rate as noted, regular rhythm.   RESP: Effort normal. Breath sounds are normal bilaterally.  GI: no tenderness/rebound/guarding, not distended.  NEURO: Alert, moving all extremities  MSK: No deformity of the extremities, no midline vertebral tenderness.  No pain with passive range of motion of the right upper extremity, bilateral lower extremities, left shoulder and left elbow.  There is swelling and tenderness about the left wrist.  Intact left radial pulse.  SKIN: Warm and dry    Emergency Department Course   ECG:  ECG taken at 1818, ECG read at 1824  Atrial fibrillation with competing junctional pacemaker  Nonspecific ST and T wave abnormality  Abnormal ECG  Rate 83 bpm. OR interval * ms. QRS duration 90 ms. QT/QTc 378/444 ms. P-R-T axes * 65 9.    Imaging:  XR Chest 1 View   Final Result   IMPRESSION: Heart size within normal limits for AP technique. Pulmonary vascularity normal. Hyperinflated lungs. Minimal scarring or atelectasis left base. No acute infiltrates or effusions. Convex right thoracolumbar curve.      XR Wrist Left G/E 3 Views   Final Result   IMPRESSION: Acute comminuted fracture of the distal radius with intra-articular extension and mild impaction. There is normal joint alignment. Surrounding soft tissue swelling. Severe first CMC joint degenerative changes. Osteopenia.      Head CT w/o contrast    (Results Pending)       Laboratory:  UA with Microscopic: Glucose 50 (A), Protein  Albumin 10 (A), Leukocyte Esterase Small (A), WBC 9 (H), Mucous Urine Present (A) o/w Negative    CBC: WBC 10.7, HGB 11.7,   BMP: Chloride 110 (H), Glucose 135 (H) o/w WNL (Creatinine 0.54)    Troponin (Collected 1803): 10    Asymptomatic COVID-19 Virus (Coronavirus) by PCR Nasopharyngeal swab: Negative    Procedures   Splint Placement    PLACEMENT: A [place] Plastic splint was applied to the left upper extremity and after placement I checked and adjusted the fit to ensure proper positioning. The patient was more comfortable with the splint in place. Sensation and circulation are intact after splint placement.     Emergency Department Course:  Reviewed:  I reviewed nursing notes, vitals and past medical history    Assessments:  1620 I obtained history and examined the patient as noted above.     1630 I spoke with the patient's niece in law regarding the patient.    1652 I rechecked and updated the patient regarding the plan for care.    1702 I spoke with the patient's niece in law, and nephew, regarding the plan for care for the patient.    2030 I rechecked and updated the patient regarding the plan for care.    Interventions:  1723 Haldol 10mg IM  1850 Rocephin 2g IV    Disposition:  The patient was admitted to the hospital under the care of Dr. Portillo.     Impression & Plan   Medical Decision Making:  Patient presents to the ER from nursing facility for evaluation of left wrist pain after reported fall.  On arrival vital signs are reassuring.  On exam patient reports falling and resulting left wrist pain and injury.  History is limited due to underlying dementia as well as patient's lack of hearing aids.  A live transcription application was used on the smart phone to facilitate further discussion.  Patient did not seem to appreciate what was going on and I do not believe her to be decisional.  I spoke with her nephew and niece in law, who reported that patient does not have an advance directive and does not  have any other living family to help with her financial or medical decisions.  I informed them that patient told me that she did not wish to do further evaluation here in the ER.  They told me that they also believe patient to be confused and would think that she would normally be accepting of these interventions.  They were in agreement in pursuing further testing, even if it required sedation.  Thus 10 mg of IM Haldol were given.  IV was established and labs were sent.  Urinalysis was slightly abnormal and that she was given Rocephin while awaiting urine culture.  Further sedation with IV Ativan was required to facilitate CT of the head, which revealed no obvious signs of hemorrhage on my interpretation, but formal radiology read pending at time of admission.  X-ray of the left wrist reveals comminuted distal radius fracture, for which custom plaster splint was placed.  Ultimately patient was admitted to the hospital for progressive confusion and concern for safety at her assisted living facility.    Diagnosis:    ICD-10-CM    1. Atrial fibrillation, unspecified type (H)  I48.91    2. Confusion  R41.0    3. Fall, initial encounter  W19.XXXA    4. Dementia without behavioral disturbance, unspecified dementia type (H)  F03.90    5. Wrist fracture, left, closed, initial encounter  S62.102A      Scribe Disclosure:  I, Yannick Rivers, am serving as a scribe at 4:27 PM on 12/13/2021 to document services personally performed by Shahram Mortensen MD based on my observations and the provider's statements to me.      Shahram Mortensen MD  12/13/21 0649

## 2021-12-13 NOTE — ED NOTES
Bed: ED31  Expected date:   Expected time:   Means of arrival:   Comments:  FV 86F fall, confirmed L wrist FX

## 2021-12-13 NOTE — LETTER
12/13/2021        RE: [facility]  [address]  [address]  [facility] [address]        [facility]  [facility] Record Number:  [id number]  Place of Service where encounter took place:  [facility] [employer] [facility] [[id number]]  Chief Complaint   Patient presents with     Fall       HPI:    Jewels Cortez  is a 86 year old (1935), who is being seen today for an episodic care visit.  HPI information obtained from: facility chart records, facility staff, patient report and [facility] chart review. Today's concern is: fall, L wrist pain, dementia.  This am, found lying on floor next to elevator. Fall not witnessed.  Denies hitting head.  Does reports L wrist pain. Has dementia with memory loss.  No recent reports of delusions, paranoia.  Some increase in confusion s/p fall. Has h/o UTIs. Uses cane for amb.  Has refused PT in past. Has severe kyphosis.        Past Medical and Surgical History reviewed in Epic today.    MEDICATIONS:    Current Outpatient Medications   Medication Sig Dispense Refill     ACETAMINOPHEN EXTRA STRENGTH 500 MG tablet TAKE TWO TABLETS (1000MG) BY MOUTH TWICE DAILY 112 tablet PRN         REVIEW OF SYSTEMS:  Unobtainable secondary to cognitive impairment. Reports L wrist pain    Objective:  BP (!) 150/66   Pulse 68   Resp 16   SpO2 94%   Exam:  GENERAL APPEARANCE:  Alert, in no distress, cooperative  ENT:  Mouth and posterior oropharynx normal, moist mucous membranes, [place]  EYES:  EOM, conjunctivae, lids, pupils and irises normal, PERRL  NECK:  FROM  RESP:  respiratory effort and palpation of chest normal, lungs clear to auscultation , no respiratory distress  CV:  Palpation and auscultation of heart done , regular rate and rhythm, no murmur, rub, or gallop, no edema  ABDOMEN:  normal bowel sounds, soft, nontender, no hepatosplenomegaly or other masses, no guarding or rebound  M/S:   severe kyphosis. no apparent pain with PROM  LEs, or UEs, except pain with palp, ROM L wrist, sl edema to area  NEURO:   Cranial nerves 2-12 are normal tested and grossly at patient's baseline, speech clear, no tremor  PSYCH:  insight and judgement impaired, memory impaired , affect and mood normal    Labs:   No recent    ASSESSMENT/PLAN:  (W19.XXXA) Fall, initial encounter  (primary encounter diagnosis)  Comment: unwitnessed. Neuros stable. Gait appears steady with cane. L wrist pain.  Some confusion present  Plan: 1. Monitor for changes in Neuros, no visible signs of trauma of head  2. Monitor for changes in gait  3. Monitor for decreased mobility, increased difficulties with ADLs due to L wrist pain  4. UA/UC r/o UTI    (M25.532) Left wrist pain  Comment: s/p fall  Plan: 1. XR L wrist  2. Cont. Tylenol  3. Refer to [facility] as needed    (F03.91) Dementia with behavioral disturbance, unspecified dementia type (H)  Comment: memory loss. some increase in confusion  Plan: 1. UA, UC as above  2. Monitor for delusions, paranoia    Electronically signed by:  LAY Wheatley CNP                 Sincerely,        LAY Wheatley CNP

## 2021-12-13 NOTE — ED TRIAGE NOTES
Pt arrives via EMS with known L wirst fx after tripping over cord this morning around 1100. Denies LOC. Family concerned for increased confusion and wanting UA. Hx dementia at baseline. ABC intact. CMS intact. No splint to L wrist.

## 2021-12-14 LAB
ATRIAL RATE - MUSE: 77 BPM
DIASTOLIC BLOOD PRESSURE - MUSE: NORMAL MMHG
INTERPRETATION ECG - MUSE: NORMAL
P AXIS - MUSE: NORMAL DEGREES
PR INTERVAL - MUSE: NORMAL MS
QRS DURATION - MUSE: 90 MS
QT - MUSE: 378 MS
QTC - MUSE: 444 MS
R AXIS - MUSE: 65 DEGREES
SYSTOLIC BLOOD PRESSURE - MUSE: NORMAL MMHG
T AXIS - MUSE: 9 DEGREES
VENTRICULAR RATE- MUSE: 83 BPM

## 2021-12-14 PROCEDURE — 99214 OFFICE O/P EST MOD 30 MIN: CPT | Performed by: INTERNAL MEDICINE

## 2021-12-14 PROCEDURE — G0378 HOSPITAL OBSERVATION PER HR: HCPCS

## 2021-12-14 PROCEDURE — 250N000013 HC RX MED GY IP 250 OP 250 PS 637: Performed by: INTERNAL MEDICINE

## 2021-12-14 RX ORDER — ONDANSETRON 4 MG/1
4 TABLET, ORALLY DISINTEGRATING ORAL EVERY 6 HOURS PRN
Status: DISCONTINUED | OUTPATIENT
Start: 2021-12-14 | End: 2021-12-20 | Stop reason: HOSPADM

## 2021-12-14 RX ORDER — ACETAMINOPHEN 325 MG/1
TABLET ORAL EVERY 6 HOURS PRN
Status: ON HOLD | COMMUNITY
End: 2021-12-20

## 2021-12-14 RX ORDER — ONDANSETRON 2 MG/ML
4 INJECTION INTRAMUSCULAR; INTRAVENOUS EVERY 6 HOURS PRN
Status: DISCONTINUED | OUTPATIENT
Start: 2021-12-14 | End: 2021-12-20 | Stop reason: HOSPADM

## 2021-12-14 RX ORDER — PROCHLORPERAZINE 25 MG
12.5 SUPPOSITORY, RECTAL RECTAL EVERY 12 HOURS PRN
Status: DISCONTINUED | OUTPATIENT
Start: 2021-12-14 | End: 2021-12-20 | Stop reason: HOSPADM

## 2021-12-14 RX ORDER — IBUPROFEN 200 MG
200-400 TABLET ORAL EVERY 6 HOURS PRN
Status: DISCONTINUED | OUTPATIENT
Start: 2021-12-14 | End: 2021-12-20 | Stop reason: HOSPADM

## 2021-12-14 RX ORDER — ACETAMINOPHEN 500 MG
1000 TABLET ORAL 3 TIMES DAILY
Status: DISCONTINUED | OUTPATIENT
Start: 2021-12-14 | End: 2021-12-20 | Stop reason: HOSPADM

## 2021-12-14 RX ORDER — PROCHLORPERAZINE MALEATE 5 MG
5 TABLET ORAL EVERY 6 HOURS PRN
Status: DISCONTINUED | OUTPATIENT
Start: 2021-12-14 | End: 2021-12-20 | Stop reason: HOSPADM

## 2021-12-14 RX ADMIN — ACETAMINOPHEN 1000 MG: 500 TABLET, FILM COATED ORAL at 09:03

## 2021-12-14 RX ADMIN — ACETAMINOPHEN 1000 MG: 500 TABLET, FILM COATED ORAL at 19:22

## 2021-12-14 NOTE — H&P
[facility]  Hospitalist Admission Note  Name: Jewels Cortez    MRN: [id number]  YOB: 1935    Age: 86 year old  Date of admission: 12/13/2021  Primary care provider: Holden Block    Chief Complaint:  Fall, confusion, wrist fracture    Assessment and Plan:     Jewels Cortez is a 86 year old female with PMH including dementia who resides in [facility] who was admitted 12/13/21 when she was found on the floor by the elevator with wrist pain and was found to have left radial wrist fracture.    1. Left Radial Wrist Fracture: Found on the ground by the elevator complaining of wrist pain.  Xray shows acute comminuted fracture of the distal radius with intra articular extension and mild impaction.  Splint placed in the ER.  - Pain control with Tylenol and PRN Ibuprofen  - Try to avoid narcotics given dementia  - Orthopedics consult    2. Dementia: Baseline dementia.  Not on any medications.  Seems quite confused here.  Will have OT assess the patient, question whether she needs more services at her [facility].  - OT and SW consult    3. Abnormal UA: Patient had mild pyuria, unclear if she had symptoms.  Given a dose of Ceftriaxone in the ER.  Hold further antibiotics but add on a urine culture.    Diet:   Regular  DVT Prophylaxis: Low Risk/Ambulatory with no VTE prophylaxis indicated  Todd Catheter: Not present  Code Status:   Full code    Disposition Plan   Expected Discharge: Admit to observation status    Entered: Sari Portillo MD 12/13/2021, 10:09 PM     The patient's care was discussed with the Patient.    Sari Portillo MD  [facility]        Clinically Significant Risk Factors Present on Admission                        History of Present Illness:  Jewels Cortez is a 86 year old female with PMH including dementia who resides in [facility] who was admitted 12/13/21 when she was found on the floor by the elevator with wrist pain and was found to have left radial wrist  fracture.  History was obtained through patient interview, chart review and discussion with Dr. Mortensen in the ER.    Patient is sleeping and not easily awoken.  She did require sedating medications to receive CT scan. I am unable to get any history from her tonight and all is from chart review and discussing with ER physician.    Patient was seen at her shelter ([facility]) as she had a fall.  She was found lying next to the elevator, fall was unwitnessed.  She reportedly denied hitting her head but did complain of left wrist pain.  She has had slightly more confusion than normal since the fall.      At baseline she uses a cane.  She has refused PT in the past and has known severe kyphosis.    The patient has a nephew who checks in on her, no other family. She has been progressively confused over the past two years.    The ER physician had difficulty with history due to dementia as well as poor hearing (she did not have her hearing aids).  A live transcription application was used on the smart phone but the patient could not really appreciate what was going on.  The patient did not want further evaluation.  ER provider spoke with her nephew who felt that the patient was confused and normally would want evaluation. They requested further evaluation even if it meant sedation.      Th patient received IM Haldol as well as IV Ativan.     Past Medical History:  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     Bilateral sensorineural hearing loss wears hearing aides 5/22/2020     Cancer (H)     skin cancer forehead and earlobe     Cataract 2019    bilateral with lens implants     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) 5/23/2020     Memory loss      Onychomycosis due to dermatophyte 5/23/2020     Right leg swelling 5/22/2020     Past Surgical History:  Past Surgical History:   Procedure Laterality Date     BLADDER LIFT SURGERY       CATARACT IOL,  RT/LT Bilateral 2020    surgery done in [place]     CERVICAL NECK SURGERY       HYSTERECTOMY       LUMBAR BACK SURGERY       skin cancer      removal of earlobe and face     Social History:  Social History     Tobacco Use     Smoking status: Never Smoker     Smokeless tobacco: Never Used   Substance Use Topics     Alcohol use: Not Currently     Social History     Social History Narrative     Not on file     Family History:  History reviewed. No pertinent family history.  Allergies:  Allergies   Allergen Reactions     Penicillins Unknown     Sulfa Drugs Unknown     Medications:  Current Facility-Administered Medications   Medication     haloperidol lactate (HALDOL) 5 MG/ML injection     Current Outpatient Medications   Medication     ACETAMINOPHEN EXTRA STRENGTH 500 MG tablet      Review of Systems:  A Comprehensive greater than 10 system review of systems was carried out.  Pertinent positives and negatives are noted above.  Otherwise negative for contributory information.     Physical Exam:  Blood pressure 92/47, pulse 87, temperature 99.8  F (37.7  C), temperature source Oral, resp. rate 20, SpO2 94 %.  Wt Readings from Last 1 Encounters:   07/21/20 43.1 kg (95 lb)     Exam:   General: Sleeping, extensive kyphosis, unable to wake her (received sedation)  HEENT: NC/AT, MMM.  Cardiac: RRR, normal S1, S2.  No murmurs/g/r.  No LE edema  Pulmonary: Normal chest rise, normal work of breathing.  Lungs CTAB without crackles or wheezing  Abdomen: soft, non-tender, non-distended.  Normoactive BS.  No guarding or rebound tenderness.  Extremities: Left forearm in a splint.  Warm, well perfused.  Skin: no rashes or lesions noted.  Warm and Dry.  Neuro: Sedated  Psych: Sedated    Data Reviewed Today:  Imaging:  Results for orders placed or performed during the hospital encounter of 12/13/21   XR Wrist Left G/E 3 Views    Narrative    EXAM: XR WRIST LEFT G/E 3 VIEWS  LOCATION: [facility]  DATE/TIME:  12/13/2021 7:59 PM    INDICATION: fall pain swelling  COMPARISON: None.      Impression    IMPRESSION: Acute comminuted fracture of the distal radius with intra-articular extension and mild impaction. There is normal joint alignment. Surrounding soft tissue swelling. Severe first CMC joint degenerative changes. Osteopenia.   Head CT w/o contrast    Narrative    EXAM: CT HEAD W/O CONTRAST  LOCATION: [facility]  DATE/TIME: 12/13/2021 7:42 PM    INDICATION: Traumatic injury. Confusion.  COMPARISON: None.  TECHNIQUE: Routine CT Head without IV contrast. Multiplanar reformats. Dose reduction techniques were used.    FINDINGS:  INTRACRANIAL CONTENTS: No intracranial hemorrhage, extraaxial collection, or mass effect.  No CT evidence of acute infarct. Mild to moderate presumed chronic small vessel ischemic changes. Mild to moderate generalized volume loss. No hydrocephalus.     VISUALIZED ORBITS/SINUSES/MASTOIDS: No intraorbital abnormality. No paranasal sinus mucosal disease. No middle ear or mastoid effusion.    BONES/SOFT TISSUES: No acute abnormality.      Impression    IMPRESSION:  1.  No acute intracranial process.   XR Chest 1 View    Narrative    EXAM: XR CHEST 1 VIEW  LOCATION: [facility]  DATE/TIME: 12/13/2021 7:59 PM    INDICATION: confusion, fall  COMPARISON: 06/22/2021       Impression    IMPRESSION: Heart size within normal limits for AP technique. Pulmonary vascularity normal. Hyperinflated lungs. Minimal scarring or atelectasis left base. No acute infiltrates or effusions. Convex right thoracolumbar curve.     Labs:  Recent Labs   Lab 12/13/21  1803   WBC 10.7   HGB 11.7   HCT 36.6   MCV 95        Recent Labs   Lab 12/13/21  1803      POTASSIUM 3.5   CHLORIDE 110*   CO2 25   ANIONGAP 6   *   BUN 19   CR 0.54   GFRESTIMATED 86   MALICK 8.6       Sari Portillo MD  Hospitalist  [facility]

## 2021-12-14 NOTE — CONSULTS
See ED note for SW consult.     REANNA Parsons, [facility]  , ED Care Management   [phone]

## 2021-12-14 NOTE — ED NOTES
[facility]  ED Nurse Handoff Report    Jewels Cortez is a 86 year old female   ED Chief complaint: Arm Injury and Altered Mental Status  . ED Diagnosis:   Final diagnoses:   Atrial fibrillation, unspecified type (H)   Confusion   Fall, initial encounter   Dementia without behavioral disturbance, unspecified dementia type (H)   Wrist fracture, left, closed, initial encounter     Allergies:   Allergies   Allergen Reactions     Penicillins Unknown     Sulfa Drugs Unknown       Code Status: Full Code  Activity level - Baseline/Home:  Assist X 1. Activity Level - Current:   Assist X 1. Lift room needed: No. Bariatric: No   Needed: No   Isolation: No. Infection: Not Applicable.     Vital Signs:   Vitals:    12/13/21 1930 12/13/21 2100 12/13/21 2115 12/13/21 2130   BP: 128/54 112/55 95/57 92/47   Pulse: 70 92 67 87   Resp:       Temp:       TempSrc:       SpO2: 97% 93% 94% 94%       Cardiac Rhythm:  ,      Pain level:    Patient confused: Yes. Patient Falls Risk: Yes.   Elimination Status: Has voided   Patient Report -Fall . Focused Assessment: Alert confused forgetful. Becomes very agitated and swings at staff . Very [place] and difficult to communicate with patient. States she has hearing aids but none came with her. She tripped and fell at home today landing on left arm. Left wrist bruised and swollen. Ice applied which patient did not keep it on. Also attempted to elevate left arm and patient refuses to keep arm elevated. Unable to draw labs or do xray. Dr Mortensen talked with family and they are concerned with the increasing confusion this past weekend. Medicated with Haldol 10 mg IM to get labs and procedures completed. Patient more relaxed able to get IV,labs, and xray completed. Unable to complete CT scan patient becoming more agitated and unable to put patient flat due to kyphosis. Ativan order and given . Able to complete CT scan. Left arm splinted by MD. Sleeping at present.  Tests  Performed: CT scan,labs, medication. Abnormal Results:   Labs Ordered and Resulted from Time of ED Arrival to Time of ED Departure   BASIC METABOLIC PANEL - Abnormal       Result Value    Sodium 141      Potassium 3.5      Chloride 110 (*)     Carbon Dioxide (CO2) 25      Anion Gap 6      Urea Nitrogen 19      Creatinine 0.54      Calcium 8.6      Glucose 135 (*)     GFR Estimate 86     ROUTINE UA WITH MICROSCOPIC REFLEX TO CULTURE - Abnormal    Color Urine Light Yellow      Appearance Urine Clear      Glucose Urine 50  (*)     Bilirubin Urine Negative      Ketones Urine Negative      Specific Gravity Urine 1.021      Blood Urine Negative      pH Urine 6.5      Protein Albumin Urine 10  (*)     Urobilinogen Urine Normal      Nitrite Urine Negative      Leukocyte Esterase Urine Small (*)     Mucus Urine Present (*)     RBC Urine 1      WBC Urine 9 (*)     Squamous Epithelials Urine 1     CBC WITH PLATELETS AND DIFFERENTIAL - Abnormal    WBC Count 10.7      RBC Count 3.85      Hemoglobin 11.7      Hematocrit 36.6      MCV 95      MCH 30.4      MCHC 32.0      RDW 12.6      Platelet Count 301      % Neutrophils 84      % Lymphocytes 8      % Monocytes 7      % Eosinophils 0      % Basophils 0      % Immature Granulocytes 1      NRBCs per 100 WBC 0      Absolute Neutrophils 9.0 (*)     Absolute Lymphocytes 0.8      Absolute Monocytes 0.7      Absolute Eosinophils 0.0      Absolute Basophils 0.0      Absolute Immature Granulocytes 0.1      Absolute NRBCs 0.0     TROPONIN I - Normal    Troponin I High Sensitivity 10     COVID-19 VIRUS (CORONAVIRUS) BY PCR - Normal    SARS CoV2 PCR Negative     .   Treatments provided: medications and left arm slpinted  Family Comments: Family updated  OBS brochure/video discussed/provided to patient:  Yes  ED Medications:   Medications   haloperidol lactate (HALDOL) 5 MG/ML injection (  Canceled Entry 12/13/21 2056)   haloperidol lactate (HALDOL) injection 10 mg (10 mg Intramuscular Given  12/13/21 1723)   cefTRIAXone (ROCEPHIN) 2 g vial to attach to  ml bag for ADULTS or NS 50 ml bag for PEDS (0 g Intravenous Stopped 12/13/21 2057)   LORazepam (ATIVAN) injection 1 mg (1 mg Intravenous Given 12/13/21 2057)     Drips infusing:  No  For the majority of the shift, the patient's behavior Yellow. Interventions performed were Had a sitter until sedated with haldol and ativan.    Sepsis treatment initiated: No     Patient tested for COVID 19 prior to admission: YES    ED Nurse Name/Phone Number: Evelyne Ren RN,   10:25 PM    RECEIVING UNIT ED HANDOFF REVIEW    Above ED Nurse Handoff Report was reviewed: Yes  Reviewed by: Evie Wheeler RN on December 14, 2021 at 5:50 PM

## 2021-12-14 NOTE — CONSULTS
Orthopedic Surgery  Requesting provider: Sari Portillo   Assessment: Left distal radius fracture    Jewels Cortez is a 86 year old female with PMH including dementia who resides in [facility] who was admitted 12/13/21 when she was found on the floor by the elevator with wrist pain and was found to have left radial wrist fracture.     /68 (BP Location: Right arm)   Pulse 81   Temp 97.9  F (36.6  C) (Oral)   Resp 19   LMP  (LMP Unknown)   SpO2 97%     Exam:  Sleepy t/o exam  Able to flex/extend fingers  Warm to touch  Brisk cap refill and pulses present  Splint In place    Plan:  Non-operative management recommended   Keep splint applied  Elevate and ice prn  Follow-up with hand provider at [facility] in 2 weeks for follow-up xrays and splint change.  [phone]
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
## 2021-12-14 NOTE — PROGRESS NOTES
SW is aware of the consult for discharge planning. BILL will assess patient after OT sees for recs.     REANNA Parsons, [facility]  , ED Care Management   [phone]

## 2021-12-14 NOTE — PROGRESS NOTES
[facility]  Hospitalist Progress Note  Zaid Burnham MD 12/14/21    Reason for Stay (Diagnosis): fall, wrist fracture.         Assessment and Plan:      Summary of Stay: Jewels Cortez is a 86 year old female with PMH including dementia who resides in [facility] who was admitted 12/13/21 when she was found on the floor by the elevator with wrist pain and was found to have left radial wrist fracture.  She remains admitted while we await orthopedic surgery evaluation and OT/social work consults for disposition.     1. Left Radial Wrist Fracture: Found on the ground by the elevator complaining of wrist pain.  Xray shows acute comminuted fracture of the distal radius with intra articular extension and mild impaction.  Splint placed in the ER.  - Pain control with Tylenol and PRN Ibuprofen  - Try to avoid narcotics given dementia  - Orthopedics consult pending     2. Dementia: Baseline dementia.  Fairly somnolent here.  Will have OT assess the patient, question whether she needs more services at her [facility].  - OT and SW consult     3. Abnormal UA: Patient had mild pyuria, unclear if she had symptoms.  Given a dose of Ceftriaxone in the ER.  Hold further antibiotics but add on a urine culture.     Diet:   Regular  DVT Prophylaxis: Low Risk/Ambulatory with no VTE prophylaxis indicated  Todd Catheter: Not present  Code Status:   Full code        Interval History (Subjective):      I assumed care today, Jewels is quite somnolent  Awaiting input from orthopedic surgery and OT  Discussed with care coordinator, need to determine if she can return back to her assisted living today or tomorrow  Urine culture no growth to date                  Physical Exam:      Last Vital Signs:  /68 (BP Location: Right arm)   Pulse 81   Temp 97.9  F (36.6  C) (Oral)   Resp 19   LMP  (LMP Unknown)   SpO2 97%     No intake or output data in the 24 hours ending 12/14/21 1520    General: Frail elderly woman lying in  bed.  HEENT: NC/AT  Cardiac: RRR, S1, S2.   Pulmonary: Normal chest rise, normal work of breathing.   Abdomen: soft, non-tender, non-distended.  Bowel Sounds Present.  No guarding.  Extremities: Left wrist splint in place.  Skin:  Warm and Dry.  Neuro: No clonus or tremor.         Medications:      All current medications were reviewed with changes reflected in problem list.         Data:      All new lab and imaging data was reviewed.   Labs:  Recent Labs   Lab 12/13/21  1803      POTASSIUM 3.5   CHLORIDE 110*   CO2 25   ANIONGAP 6   *   BUN 19   CR 0.54   GFRESTIMATED 86   MALICK 8.6     Recent Labs   Lab 12/13/21  1803   WBC 10.7   HGB 11.7   HCT 36.6   MCV 95         Imaging:   Recent Results (from the past 48 hour(s))   XR Wrist Left G/E 3 Views    Narrative    EXAM: XR WRIST LEFT G/E 3 VIEWS  LOCATION: [facility]  DATE/TIME: 12/13/2021 7:59 PM    INDICATION: fall pain swelling  COMPARISON: None.      Impression    IMPRESSION: Acute comminuted fracture of the distal radius with intra-articular extension and mild impaction. There is normal joint alignment. Surrounding soft tissue swelling. Severe first CMC joint degenerative changes. Osteopenia.   XR Chest 1 View    Narrative    EXAM: XR CHEST 1 VIEW  LOCATION: [facility]  DATE/TIME: 12/13/2021 7:59 PM    INDICATION: confusion, fall  COMPARISON: 06/22/2021       Impression    IMPRESSION: Heart size within normal limits for AP technique. Pulmonary vascularity normal. Hyperinflated lungs. Minimal scarring or atelectasis left base. No acute infiltrates or effusions. Convex right thoracolumbar curve.   Head CT w/o contrast    Narrative    EXAM: CT HEAD W/O CONTRAST  LOCATION: [facility]  DATE/TIME: 12/13/2021 7:42 PM    INDICATION: Traumatic injury. Confusion.  COMPARISON: None.  TECHNIQUE: Routine CT Head without IV contrast. Multiplanar reformats. Dose reduction techniques were  used.    FINDINGS:  INTRACRANIAL CONTENTS: No intracranial hemorrhage, extraaxial collection, or mass effect.  No CT evidence of acute infarct. Mild to moderate presumed chronic small vessel ischemic changes. Mild to moderate generalized volume loss. No hydrocephalus.     VISUALIZED ORBITS/SINUSES/MASTOIDS: No intraorbital abnormality. No paranasal sinus mucosal disease. No middle ear or mastoid effusion.    BONES/SOFT TISSUES: No acute abnormality.      Impression    IMPRESSION:  1.  No acute intracranial process.         Zaid Burnham MD

## 2021-12-14 NOTE — PHARMACY-ADMISSION MEDICATION HISTORY
Admission medication history interview status for this patient is complete. See [facility] admission navigator for allergy information, prior to admission medications and immunization status.     Medication history interview done, indicate source(s): [facility] in [facility].  Medication history resources (including written lists, pill bottles, clinic record):None  Pharmacy: none    Changes made to PTA medication list:  Added: none  Changed: tylenol to prn  Reported as Not Taking: none  Removed: none    Actions taken by pharmacist (provider contacted, etc): called Servando Arnett in [place] and was not able to get a hold of a nurse to request MAR ----> Servando Arnett called back and reported that patient is NOT TAKING ANY RX medications, only PRN Tylenol.      Additional medication history information:None    Medication reconciliation/reorder completed by provider prior to medication history?  yes   (Y/N)     For patients on insulin therapy: no    Prior to Admission medications    Medication Sig Last Dose Taking? Auth Provider   acetaminophen (TYLENOL) 325 MG tablet Take by mouth every 6 hours as needed for mild pain prn Yes Unknown, Entered By History

## 2021-12-15 ENCOUNTER — APPOINTMENT (OUTPATIENT)
Dept: OCCUPATIONAL THERAPY | Facility: CLINIC | Age: 86
End: 2021-12-15
Attending: INTERNAL MEDICINE
Payer: MEDICARE

## 2021-12-15 LAB — BACTERIA UR CULT: NO GROWTH

## 2021-12-15 PROCEDURE — 250N000013 HC RX MED GY IP 250 OP 250 PS 637: Performed by: INTERNAL MEDICINE

## 2021-12-15 PROCEDURE — 97165 OT EVAL LOW COMPLEX 30 MIN: CPT | Mod: GO

## 2021-12-15 PROCEDURE — 97535 SELF CARE MNGMENT TRAINING: CPT | Mod: GO

## 2021-12-15 PROCEDURE — 99225 PR SUBSEQUENT OBSERVATION CARE,LEVEL II: CPT | Performed by: PHYSICIAN ASSISTANT

## 2021-12-15 PROCEDURE — 250N000011 HC RX IP 250 OP 636: Performed by: PHYSICIAN ASSISTANT

## 2021-12-15 PROCEDURE — 96375 TX/PRO/DX INJ NEW DRUG ADDON: CPT

## 2021-12-15 PROCEDURE — G0378 HOSPITAL OBSERVATION PER HR: HCPCS

## 2021-12-15 PROCEDURE — 250N000013 HC RX MED GY IP 250 OP 250 PS 637: Performed by: PHYSICIAN ASSISTANT

## 2021-12-15 PROCEDURE — 99207 PR CDG-CODE CATEGORY CHANGED: CPT | Performed by: PHYSICIAN ASSISTANT

## 2021-12-15 RX ORDER — RISPERIDONE 0.25 MG/1
.25-.5 TABLET, ORALLY DISINTEGRATING ORAL EVERY 8 HOURS PRN
Status: DISCONTINUED | OUTPATIENT
Start: 2021-12-15 | End: 2021-12-16

## 2021-12-15 RX ORDER — HALOPERIDOL 5 MG/ML
1 INJECTION INTRAMUSCULAR EVERY 8 HOURS PRN
Status: DISCONTINUED | OUTPATIENT
Start: 2021-12-15 | End: 2021-12-15

## 2021-12-15 RX ORDER — HALOPERIDOL 5 MG/ML
1 INJECTION INTRAMUSCULAR EVERY 8 HOURS PRN
Status: DISCONTINUED | OUTPATIENT
Start: 2021-12-15 | End: 2021-12-18

## 2021-12-15 RX ADMIN — ACETAMINOPHEN 1000 MG: 500 TABLET, FILM COATED ORAL at 08:16

## 2021-12-15 RX ADMIN — HALOPERIDOL LACTATE 1 MG: 5 INJECTION, SOLUTION INTRAMUSCULAR at 18:34

## 2021-12-15 RX ADMIN — IBUPROFEN 200 MG: 200 TABLET, FILM COATED ORAL at 02:40

## 2021-12-15 RX ADMIN — ACETAMINOPHEN 1000 MG: 500 TABLET, FILM COATED ORAL at 21:12

## 2021-12-15 RX ADMIN — Medication 5 MG: at 21:12

## 2021-12-15 RX ADMIN — ACETAMINOPHEN 1000 MG: 500 TABLET, FILM COATED ORAL at 14:31

## 2021-12-15 NOTE — PROGRESS NOTES
[facility]    Hospitalist Progress Note      Assessment & Plan Jewels SHELDON Cortez is a 86 year old female with PMHx including dementia with behavior disturbances, sensorineural hearing loss, cataracts, recurrent falls, who was admitted 12/13/21 after an unwitnessed fall found to have a left Radial wrist fracture.    On presentation vitals were stable. She required IM haldol and ativan in the ED to facilitate exam and imaging. Labs unremarkable. Xray showed acute comminuted fracture of the distal radius with intra articular extension and mild impaction. Splint placed in the ER. NHCHT negative for acute findings. Admitted for further cares including orthopedics consultation and input for disposition assistance from therapies and social work.    1. Left Radial Wrist Fracture: s/p splint  Secondary to mechanical fall? Unwitnessed.   -Non-operative management recommended   -Elevate and ice prn  -Follow-up with hand provider at [facility] in 2 weeks for follow-up xrays and splint change.  [phone]  analgesia Tylenol and PRN Ibuprofen    2. Dementia with behavior disturbances: seemingly worse than baseline currently but has had progression of symptoms per discussion with Niece.  Known history of behavior disturbances including paranoia.  She is not managed with psychiatric medications prior to admission.  She has no formal power of .  Next of kin would be the patient's niece, Dulce who is also considered decision maker at this point as patient cannot make informed medical decisions..  She is cooperative with cares currently.  Quite deaf and that also makes communication assessment of mentation a challenge.  -no current sitter requirement   -if needed could trial Risperdal nightly if paranoia behaviors are a concern for now redirect and monitor   -up in chair TID for meals  -high risk hospital delirium  -SW is consulted, guardianship/POA not yet established formally   -will need TCU and  eventually memory care v. Increased assistance at AL     Addendum:  -Notified by RN requesting medications this afternoon for patient calling out and attempting to transfer without supervision.    -Recommended toileting, now improved after using restroom.  - Her QTC is within normal limits so will reorder antipsychotics at low dose if unable to be redirected safely for behavior disturbance     3. Abnormal UA: Patient had mild pyuria, unclear if she had symptoms.  Given a dose of Ceftriaxone in the ER.  Hold further antibiotics. No evidence of acute UTI and culture is negative to date.    DVT Prophylaxis: Pneumatic Compression Devices  Baseline functional status: Ambulatory with cane but requires assistance with IADLs, medication management etc.  Code Status: Full Code  Expected Discharge: 12/16/2021     Anticipated discharge location:  Awaiting care coordination huddle  Delays:     OT Consult           Michelle Willams PA-C      Interval History   Assumed care  Patient is quite hard of hearing she has a pocket talker on but still does not appear to hear the interviewer as she asked repeated questions  Perseverates on that she thinks a surgeon has not seen her regarding her wrist and that it has not been imaged.  We reviewed her care plan and her work-up thus far subsequently x2    -Data reviewed today: I reviewed all new labs and imaging results over the last 24 hours.    Physical Exam   Temp: 98  F (36.7  C) Temp src: Oral BP: (!) 145/61 Pulse: 67   Resp: 18 SpO2: 94 % O2 Device: None (Room air)    Vitals:    12/14/21 1847 12/15/21 0235   Weight: 43.4 kg (95 lb 11.2 oz) 44.1 kg (97 lb 4.8 oz)     Vital Signs with Ranges  Temp:  [97.9  F (36.6  C)-98.5  F (36.9  C)] 98  F (36.7  C)  Pulse:  [67-81] 67  Resp:  [16-19] 18  BP: (114-157)/(59-71) 145/61  SpO2:  [94 %-97 %] 94 %  No intake/output data recorded.      Constitutional:Awake, alert, no apparent distress. Frail.  Respiratory:  Normal work of breathing. Lungs  clear to auscultation bilaterally, no crackles or wheezing.  Cardiovascular: Regular rate and rhythm, normal S1 and S2, and no murmur appreciated.   GI: Normal bowel sounds, soft, non-distended, non-tender.   Skin/Integument: No rashes, no cyanosis, no peripheral edema.  Neuro:  Coordination and sensation grossly intact. Speech clear. Very hard of hearing.  Extremities: No lateralizing weakness.  Left wrist splint in place.  Sensation intact in left fingers as is capillary refill.  No elbow pain.  No shoulder pain.  Psych: No overt hallucinations.  Difficult to redirect and perseverates, difficult to interview.            Medications       acetaminophen  1,000 mg Oral TID       Data   Recent Labs   Lab 12/13/21  1803   WBC 10.7   HGB 11.7   MCV 95         POTASSIUM 3.5   CHLORIDE 110*   CO2 25   BUN 19   CR 0.54   ANIONGAP 6   MALICK 8.6   *       No results found for this or any previous visit (from the past 24 hour(s)).

## 2021-12-15 NOTE — PLAN OF CARE
PRIMARY DIAGNOSIS: CONFUSION, FALL, LEFT RADIUS FRACTURE     OUTPATIENT/OBSERVATION GOALS TO BE MET BEFORE DISCHARGE  1. Tolerating PO medications: Yes    2. Return to near baseline physical activity:  Up with assist of 2 for transfers.     3. Cleared for discharge by consultants (if involved): No    Patient alert, oriented to self and place, disoriented to time and situation. Vitals are Temp: 98.5  F (36.9  C) Temp src: Oral BP: 114/59 Pulse: 68   Resp: 18 SpO2: 97 % RA. Denies pain at this time. Splint to left wrist is clean, dry, and intact. CMS intact. No surgery per ortho- out patient follow up. OT consulted. Patient may benefit from PT consult. Tolerating regular diet. IV saline locked. Pleasant and cooperative with cares. Continuing with supportive cares and symptom management.     Discharge Planner Nurse   Safe discharge environment identified: No  Barriers to discharge: Yes       Entered by: Anjana Mittal 12/15/2021 1:34 AM     Please review provider order for any additional goals.   Nurse to notify provider when observation goals have been met and patient is ready for discharge.

## 2021-12-15 NOTE — PROGRESS NOTES
Care Management Follow Up    Length of Stay (days): 1    Expected Discharge Date: 12/16/2021     Concerns to be Addressed: discharge planning     Patient plan of care discussed at interdisciplinary rounds: Yes    Anticipated Discharge Disposition: Skilled Nursing Facilty     Anticipated Discharge Services: None  Anticipated Discharge DME: None    Patient/family educated on Medicare website which has current facility and service quality ratings: yes  Education Provided on the Discharge Plan:  Yes  Patient/Family in Agreement with the Plan: yes    Referrals Placed by CM/SW: Post Acute Facilities  Private pay costs discussed: transportation costs    Additional Information:  BILL spoke with RODRIGUEZ Peña at [facility] in [place] ([phone]). She reports that patient receives assist with meals, cleaning and safety checks 2x/day. Ambulates independently with a cane at baseline. They report that patient is hesitant to increase services d/t cost. RN reports that pt does not have POA paperwork in place and patient is her own decision maker at this time.     OT evaluated patient and recommended TCU. Met with patient using a pocket talker as she is [facility]. Patient was pleasantly confused but appeared agreeable to TCU at discharge.     SW spoke with ximena Cardona via phone. She is agreeable to TCU for patient at discharge. [place] Inquired about pt receiving Moderna COVID booster as she was supposed to receive it at [facility] today. [facility] and [facility]. Family can transport at discharge.     TCU referrals sent. BILL will continue to follow.     REANNA Horne         Nasal mucosa clear.  Mouth with normal mucosa  Throat has no vesicles, no oropharyngeal exudates and uvula is midline.

## 2021-12-15 NOTE — PLAN OF CARE
Temp: 98.4  F (36.9  C) Temp src: Oral BP: (!) 157/71 Pulse: 81   Resp: 16 SpO2: 96 % O2 Device: None (Room air)       Pt knew she was in a hospital but mainly only disoriented to time. Up Ax2. Pleasant and cooperative. Very hard of hearing, pocket talker ordered and did not work a second pocket talker was ordered but has not arrived to the unit yet, pt is a good lip reader. LUE splinted and elevated on pillows and ice pack is in place. Scheduled Tylenol given, pt grimacing in pain per sitter after the tylenol she appears more comfortable and pt declined needing more prn pain meds. Little appetite, ate a couple bites of her boxed lunch. Plan- pain management, urine culture pending, SW following for discharge planning, OT consult tomorrow, ortho said nonoperative and to follow-up in 2 weeks with [facility].

## 2021-12-15 NOTE — UTILIZATION REVIEW
Concurrent stay review; Secondary Review Determination    Under the authority of the Utilization Management Committee, the utilization review process indicated a secondary review on the above patient. The review outcome is based on review of the medical records, discussions with staff, and applying clinical experience noted on the date of the review.    (x) Observation Status Appropriate - Concurrent stay review    RATIONALE FOR DETERMINATION    Jewels Cortez is an 86 year old female with PMH including dementia who resides in an [facility] who was admitted 12/13/21 when she was found on the floor by the elevator at [facility] with wrist pain.  Emergency department evaluation showed stable vital signs.  Labs were unremarkable.  She was found to have left radial wrist fracture.    Wrist was splinted.  She was seen by orthopedic surgery and nonoperative management was recommended.  Her pain is adequately controlled.  She is awaiting transitional care placement.      Patient is clinically improving and there is no clear indication to change patient's status to inpatient. The severity of illness, intensity of service provided, expected LOS and risk for adverse outcome make the care appropriate for observation.    This document was produced using voice recognition software    The information on this document is developed by the utilization review team in order for the business office to ensure compliance. This only denotes the appropriateness of proper admission status and does not reflect the quality of care rendered.    The definitions of Inpatient Status and Observation Status used in making the determination above are those provided in the CMS Coverage Manual, Chapter 1 and Chapter 6, section 70.4.    Sincerely,    Chucho Deal MD     Utilization Review    Physician Advisor    [facility].     previously intubated - no problems

## 2021-12-15 NOTE — PLAN OF CARE
PRIMARY DIAGNOSIS: CONFUSION, FALL, LEFT RADIUS FRACTURE     OUTPATIENT/OBSERVATION GOALS TO BE MET BEFORE DISCHARGE  1. Tolerating PO medications: Yes    2. Return to near baseline physical activity:  Up with assist of 2 for transfers.     3. Cleared for discharge by consultants (if involved): No    Patient alert, oriented to self and place, disoriented to time and situation. Vitals are Temp: 98  F (36.7  C) Temp src: Oral BP: (!) 145/61 Pulse: 67   Resp: 18 SpO2: 94 % RA. Denies pain at this time. Splint to left wrist is clean, dry, and intact. CMS intact. No surgery per ortho- out patient follow up. OT consulted. Up with assist of 2 with gait belt and cane. Patient may benefit from PT consult. Tolerating regular diet. IV saline locked. Pleasant and cooperative with cares. Continuing with supportive cares and symptom management.     Discharge Planner Nurse   Safe discharge environment identified: No  Barriers to discharge: Yes       Entered by: Anjana Mittal 12/15/2021 5:40 AM     Please review provider order for any additional goals.   Nurse to notify provider when observation goals have been met and patient is ready for discharge.

## 2021-12-15 NOTE — PLAN OF CARE
ROOM # [id number]    Living Situation (if not independent, order SW consult):  Facility name:  : Niece/ nephew, pt lives at [facility] in [place] in an apartment     Activity level at baseline: Ind w/ cane  Activity level on admit: Ax2       Patient registered to observation; given Patient Bill of Rights; given the opportunity to ask questions about observation status and their plan of care.  Patient has been oriented to the observation room, bathroom and call light is in place.    Discussed discharge goals and expectations with patient/family.

## 2021-12-15 NOTE — PROGRESS NOTES
"   12/15/21 0821   Quick Adds   Type of Visit Initial Occupational Therapy Evaluation   Living Environment   People in home facility resident;alone   Current Living Arrangements assisted living   Home Accessibility no concerns   Living Environment Comments Pt lives in [facility], no environmental concerns   Self-Care   Usual Activity Tolerance good   Current Activity Tolerance fair   Equipment Currently Used at Home grab bar, tub/shower;grab bar, toilet;cane, straight   Instrumental Activities of Daily Living (IADL)   IADL Comments Reports facility assists with cleaning, laundry, meds (however pt reports she does not take any meds) and provide 3 meals/day   Disability/Function   Hearing Difficulty or Deaf yes   Fall history within last six months yes   Number of times patient has fallen within last six months 1   Change in Functional Status Since Onset of Current Illness/Injury yes   General Information   Onset of Illness/Injury or Date of Surgery 12/13/21   Referring Physician Sari Portillo MD   Patient/Family Therapy Goal Statement (OT) \"I want to go home\"    Additional Occupational Profile Info/Pertinent History of Current Problem Per chart: Pt is an 86 year old female admitted  when she was found on the floor by the elevator with wrist pain and was found to have left radial wrist fracture   Performance Patterns (Routines, Roles, Habits) Pt reports mod I in all ADLs and mobility tasks with use of cane. Reports facility provides IADL assist including 3 meals/day, laundry, cleaning, meds (does not take any).    Existing Precautions/Restrictions fall   Left Upper Extremity (Weight-bearing Status) non weight-bearing (NWB)   Cognitive Status Examination   Orientation Status person   Affect/Mental Status (Cognitive) confused   Cognitive Status Comments Dementia at baseline   Visual Perception   Visual Impairment/Limitations corrective lenses full-time   Pain Assessment   Patient Currently in Pain Yes, see Vital Sign " flowsheet  (L wrist pain )   Posture   Posture Comments Kyphotic posture   Range of Motion Comprehensive   Comment, General Range of Motion L wrist limited by splint; other UEs WFL   Strength Comprehensive (MMT)   Comment, General Manual Muscle Testing (MMT) Assessment Generalized weakness UEs; LUE NT   Coordination   Upper Extremity Coordination Left UE impaired   Bed Mobility   Bed Mobility supine-sit;sit-supine   Transfers   Transfers sit-stand transfer;toilet transfer   Sit-Stand Transfer   Sit-Stand [place] (Transfers) minimum assist (75% patient effort)   Assistive Device (Sit-Stand Transfers) cane, straight   Toilet Transfer   [place] Level (Toilet Transfer) minimum assist (75% patient effort)   Assistive Device (Toilet Transfer) cane, straight   Balance   Balance Comments Mild unsteadiness, min A provided while ambulating with cane   Activities of Daily Living   BADL Assessment lower body dressing;toileting   Lower Body Dressing Assessment   [place] Level (Lower Body Dressing) maximum assist (25% patient effort)   Toileting   [place] Level (Toileting) moderate assist (50% patient effort)   Clinical Impression   Criteria for Skilled Therapeutic Interventions Met (OT) yes;meets criteria;skilled treatment is necessary   OT Diagnosis Impaired ADLs, mobility tasks   OT Problem List-Impairments impacting ADL problems related to;activity tolerance impaired;balance;cognition;pain;strength   ADL comments/analysis Pt below baseline level of functioning in daily tasks   Assessment of Occupational Performance 5 or more Performance Deficits   Identified Performance Deficits Bathing, dressing, grooming, toileting, transfers   Planned Therapy Interventions (OT) ADL retraining;strengthening;transfer training   Clinical Decision Making Complexity (OT) low complexity   Therapy Frequency (OT) 5x/week   Predicted Duration of Therapy 4 days   Risk & Benefits of therapy have been explained evaluation/treatment  results reviewed;care plan/treatment goals reviewed;risks/benefits reviewed;current/potential barriers reviewed;participants voiced agreement with care plan;participants included;patient   OT Discharge Planning    OT Discharge Recommendation (DC Rec) Transitional Care Facility;Home with assist;home with home care occupational therapy   OT Rationale for DC Rec Pt requiring Ax1 for all transfers/mobility with use of cane. Recommend ongoing skilled OT while IP and in TCU setting to improve strength, functional activity tolerance, safety and balance needed for daily tasks. If [facility] able to provide Ax1 for all transfers/mobility, ADLs and resumed IADL assist, may be able to return to [facility] w/ HH therapy services.    Total Evaluation Time (Minutes)   Total Evaluation Time (Minutes) 8

## 2021-12-16 ENCOUNTER — APPOINTMENT (OUTPATIENT)
Dept: OCCUPATIONAL THERAPY | Facility: CLINIC | Age: 86
End: 2021-12-16
Payer: MEDICARE

## 2021-12-16 PROCEDURE — 97535 SELF CARE MNGMENT TRAINING: CPT | Mod: GO

## 2021-12-16 PROCEDURE — G0378 HOSPITAL OBSERVATION PER HR: HCPCS

## 2021-12-16 PROCEDURE — 250N000013 HC RX MED GY IP 250 OP 250 PS 637: Performed by: INTERNAL MEDICINE

## 2021-12-16 PROCEDURE — 250N000011 HC RX IP 250 OP 636: Performed by: PHYSICIAN ASSISTANT

## 2021-12-16 PROCEDURE — 99225 PR SUBSEQUENT OBSERVATION CARE,LEVEL II: CPT | Performed by: PHYSICIAN ASSISTANT

## 2021-12-16 PROCEDURE — 250N000013 HC RX MED GY IP 250 OP 250 PS 637: Performed by: PHYSICIAN ASSISTANT

## 2021-12-16 PROCEDURE — 96376 TX/PRO/DX INJ SAME DRUG ADON: CPT

## 2021-12-16 PROCEDURE — 99207 PR CDG-CODE CATEGORY CHANGED: CPT | Performed by: PHYSICIAN ASSISTANT

## 2021-12-16 RX ORDER — RISPERIDONE 0.25 MG/1
0.25 TABLET ORAL 3 TIMES DAILY
Status: DISCONTINUED | OUTPATIENT
Start: 2021-12-16 | End: 2021-12-16

## 2021-12-16 RX ORDER — RISPERIDONE 0.25 MG/1
0.25 TABLET ORAL EVERY 8 HOURS
Status: DISCONTINUED | OUTPATIENT
Start: 2021-12-16 | End: 2021-12-16

## 2021-12-16 RX ORDER — RISPERIDONE 0.25 MG/1
0.25 TABLET, ORALLY DISINTEGRATING ORAL EVERY 8 HOURS
Status: DISCONTINUED | OUTPATIENT
Start: 2021-12-16 | End: 2021-12-16

## 2021-12-16 RX ORDER — RISPERIDONE 0.25 MG/1
0.25 TABLET, ORALLY DISINTEGRATING ORAL EVERY 8 HOURS
Status: DISCONTINUED | OUTPATIENT
Start: 2021-12-16 | End: 2021-12-16 | Stop reason: RX

## 2021-12-16 RX ORDER — RISPERIDONE 0.25 MG/1
.25-.5 TABLET, ORALLY DISINTEGRATING ORAL EVERY 8 HOURS
Status: DISCONTINUED | OUTPATIENT
Start: 2021-12-16 | End: 2021-12-16

## 2021-12-16 RX ADMIN — QUETIAPINE FUMARATE 12.5 MG: 25 TABLET ORAL at 14:55

## 2021-12-16 RX ADMIN — ACETAMINOPHEN 1000 MG: 500 TABLET, FILM COATED ORAL at 17:04

## 2021-12-16 RX ADMIN — HALOPERIDOL LACTATE 1 MG: 5 INJECTION, SOLUTION INTRAMUSCULAR at 06:16

## 2021-12-16 RX ADMIN — QUETIAPINE FUMARATE 12.5 MG: 25 TABLET ORAL at 21:05

## 2021-12-16 RX ADMIN — Medication 5 MG: at 21:05

## 2021-12-16 RX ADMIN — ACETAMINOPHEN 1000 MG: 500 TABLET, FILM COATED ORAL at 10:52

## 2021-12-16 RX ADMIN — RISPERIDONE 0.25 MG: 0.25 TABLET ORAL at 10:52

## 2021-12-16 NOTE — PROGRESS NOTES
Care from 2657-4700:     Disoriented x2, very forgetful, VSS on RA. Denies any pain but scheduled tylenol given x2. Elevating L arm, ice applied. LUE ace wrapped, CMS intact. Assist x1 with cane. Tolerating regular diet, fair appetite. Pt get very aggitated around 1800, trying to get OOB multiple times after toileting pt. Pt then trying to tear off splint to LUE. IV haldol given d/t pt's behaviors and not willing to try oral medications. Ortho, PT/OT/SW following. Probable TCU placement at discharge. Continue to monitor.     Temp: 97.7  F (36.5  C) Temp src: Oral BP: 134/65 Pulse: 89   Resp: 16 SpO2: 94 % O2 Device: None (Room air)

## 2021-12-16 NOTE — PROGRESS NOTES
PRIMARY DIAGNOSIS: GENERALIZED WEAKNESS     OUTPATIENT/OBSERVATION GOALS TO BE MET BEFORE DISCHARGE  1. Orthostatic performed: No     2. Tolerating PO medications: Yes     3. Return to near baseline physical activity: Yes     4. Cleared for discharge by consultants (if involved): No- OT recommended tcu, SW following        Discharge Planner Nurse      Safe discharge environment identified: Yes  Barriers to discharge: Yes       Entered by: Melanie Borjas 12/16/2021 4:45 AM        Please review provider order for any additional goals.   Nurse to notify provider when observation goals have been met and patient is ready for discharge.     Denies pain. On scheduled tylenol. Patient alert to self. Patient and calm and cooperative at times but waxes and wanes due to agitation. SL. Left wrist splinted and wrapped. [place]-pocket talked in use. Up x1 with cane and gb. Bed alarm on. Patient needed IV haldol by previous shift. OT recommended tcu. SW [facility]

## 2021-12-16 NOTE — PROGRESS NOTES
PRIMARY DIAGNOSIS: GENERALIZED WEAKNESS    OUTPATIENT/OBSERVATION GOALS TO BE MET BEFORE DISCHARGE  1. Orthostatic performed: No    2. Tolerating PO medications: Yes    3. Return to near baseline physical activity: Yes    4. Cleared for discharge by consultants (if involved): No- OT recommended tcu, SW following    Discharge Planner Nurse   Safe discharge environment identified: Yes  Barriers to discharge: Yes       Entered by: Melanie Borjas 12/16/2021 4:45 AM     Please review provider order for any additional goals.   Nurse to notify provider when observation goals have been met and patient is ready for discharge.    Denies pain. On scheduled tylenol. Patient alert to self. Patient and calm and cooperative at times but waxes and wanes to agitation. SL. Left wrist splinted and wrapped. [place]-pocket talked in use. Up x1 with cane and gb. Bed alarm on.

## 2021-12-16 NOTE — PROGRESS NOTES
SPIRITUAL HEALTH SERVICES Progress Note     -- I was walking down the joyce on the unit when I heard a woman calling out for help.  I entered her room to find the patient sitting up in bed, moving her legs and clutching her arm.       -- According to the medical record, patient has dementia.  She presented as confused and in pain.     -- She kept calling out that she wanted to talk to her doctor.  I asked if she would like me to get her nurse to give her an update on her plan of care.  She said yes.     -- She then quieted down.  I asked if she wanted to talk to me, the .  She said no.      Rev. Kandice Grewal M.Div.  Staff   Phone  [phone]

## 2021-12-16 NOTE — PROGRESS NOTES
Care Management Follow Up    Length of Stay (days): 1    Expected Discharge Date: 12/17/2021     Concerns to be Addressed: discharge planning     Patient plan of care discussed at interdisciplinary rounds: Yes    Anticipated Discharge Disposition: Skilled Nursing Facilty     Anticipated Discharge Services: None  Anticipated Discharge DME: None    Patient/family educated on Medicare website which has current facility and service quality ratings: yes  Education Provided on the Discharge Plan:  Yes  Patient/Family in Agreement with the Plan: yes    Referrals Placed by CM/SW: Post Acute Facilities  Private pay costs discussed: Not applicable    Additional Information:  TCU referrals pending at [facility] and [facility]. Patient has been without a sitter for 24 hours. Patient required IV Haldol overnight. Will continue to follow.     SW received call from ximena Cardona ([phone]). SW provided update on discharge plan.     SW will continue to follow.     REANNA Horne, [facility]   Inpatient Care Coordination  [facility]   [phone]

## 2021-12-16 NOTE — PROGRESS NOTES
[facility]    Hospitalist Progress Note      Assessment & Plan   Jewels SHELDON Cortez is a 86 year old female with PMHx including dementia with behavior disturbances, sensorineural hearing loss, cataracts, recurrent falls, who was admitted 12/13/21 after an unwitnessed fall found to have a left Radial wrist fracture.     On presentation vitals were stable. She required IM haldol and ativan in the ED to facilitate exam and imaging. Labs unremarkable. Xray showed acute comminuted fracture of the distal radius with intra articular extension and mild impaction. Splint placed in the ER. NHCHT negative for acute findings. Admitted for further cares including orthopedics consultation and input for disposition assistance from therapies and social work.    1. Left Radial Wrist Fracture: s/p splint  Secondary to mechanical fall? Unwitnessed.   -Non-operative management recommended   -Elevate and ice prn  -Follow-up with hand provider at [facility] in 2 weeks for follow-up xrays and splint change.  [phone]  analgesia Tylenol and PRN Ibuprofen     2. Dementia with Behavior Disturbances: Exacerbated in hospital setting with restless behavior, impulsitivity. Disoriented.  Patient has had progression PTA per discussion with Niece. Known history of behavior disturbances including paranoia.  Not managed with psychiatric medications prior to admission.  She has no formal power of .  Next of kin would be the patient's niece, Dulce who is also considered decision maker at this point as patient cannot make informed medical decisions.  Quite deaf and that also makes communication assessment of mentation a challenge.  -no current sitter requirement   -Her QTC is within normal limits so will reorder antipsychotics at low dose if unable to be redirected safely for behavior disturbance  -if needed could trial Risperdal nightly if paranoia behaviors are a concern for now redirect and monitor. Trial scheduled  seroquel instead.   -beside attendant prn    -up in chair TID for meals  -high risk hospital delirium  -SW is consulted, guardianship/POA not yet established formally   -will need TCU and eventually memory care v. Increased assistance at AL        3. Abnormal UA: Patient had mild pyuria, unclear if she had symptoms.  Given a dose of Ceftriaxone in the ER.  Hold further antibiotics. No evidence of acute UTI and culture is negative to date.      DVT Prophylaxis: Pneumatic Compression Devices  Code Status: Full Code  Expected Discharge: 12/17/2021     Anticipated discharge location: inpatient rehabilitation facility    Delays:     Placement - TCU  Administering IV medications         Michelle Willams PA-C      Interval History   Patient is anxious and restless.  She is disoriented today requiring antipsychotics overnight for behaviors.  She does at times recognize that she is in the hospital but forgets that she has a fracture and has been repeatedly asking to leave.  She denies any pain.  She is tolerating a diet.  No dysuria.      -Data reviewed today: I reviewed all new labs and imaging results over the last 24 hours.     Physical Exam   Temp: 98.3  F (36.8  C) Temp src: Oral BP: 137/67 Pulse: 91   Resp: 24 SpO2: 98 % O2 Device: None (Room air)    Vitals:    12/14/21 1847 12/15/21 0235   Weight: 43.4 kg (95 lb 11.2 oz) 44.1 kg (97 lb 4.8 oz)     Vital Signs with Ranges  Temp:  [97.7  F (36.5  C)-98.3  F (36.8  C)] 98.3  F (36.8  C)  Pulse:  [76-93] 91  Resp:  [16-24] 24  BP: (104-163)/(64-94) 137/67  SpO2:  [95 %-98 %] 98 %  No intake/output data recorded.       Constitutional:Awake, alert, no apparent distress. Frail.  Respiratory:  Normal work of breathing. Lungs clear to auscultation bilaterally, no crackles or wheezing.  Cardiovascular: Regular rate and rhythm, normal S1 and S2, and no murmur appreciated.   GI: Normal bowel sounds, soft, non-distended, non-tender.   Skin/Integument: No rashes, no cyanosis, no  peripheral edema.  Neuro:  Coordination and sensation grossly intact. Speech clear. Very hard of hearing.  Extremities: No lateralizing weakness.  Left wrist splint in place.  Sensation intact in left fingers as is capillary refill.  No elbow pain.  No shoulder pain.  Psych: No overt hallucinations.  Difficult to redirect and perseverates, difficult to interview.          Medications       acetaminophen  1,000 mg Oral TID     melatonin  5 mg Oral At Bedtime     QUEtiapine  12.5 mg Oral BID     risperiDONE  0.25 mg Oral Q8H       Data   Recent Labs   Lab 12/13/21  1803   WBC 10.7   HGB 11.7   MCV 95         POTASSIUM 3.5   CHLORIDE 110*   CO2 25   BUN 19   CR 0.54   ANIONGAP 6   MALICK 8.6   *       No results found for this or any previous visit (from the past 24 hour(s)).

## 2021-12-16 NOTE — PROGRESS NOTES
Care from [id number]:    Pt alert to self. Pt trying to get OOB multiple times, setting off bed alarm. Redirected multiple times to the bathroom, to go or a walk, play with toys/babies, etc. Pt adamant that she is leaving or has things to do. Scheduled seroquel ordered and given. Attempted to given PRN seroquel as pt continues to be restless/getting OOB/safety risk but she keeps spitting it out. Denies any pain, on scheduled tylenol. Elevating LUE with pillows, ice applied. CMS intact. LUE splinted/ace wrapped. Ortho, PT/SW/OT following. Continue to monitor.       Temp: 98.3  F (36.8  C) Temp src: Oral BP: 137/67 Pulse: 91   Resp: 24 SpO2: 98 % O2 Device: None (Room air)

## 2021-12-16 NOTE — PROGRESS NOTES
PRIMARY DIAGNOSIS: GENERALIZED WEAKNESS     OUTPATIENT/OBSERVATION GOALS TO BE MET BEFORE DISCHARGE  1. Orthostatic performed: No     2. Tolerating PO medications: Yes     3. Return to near baseline physical activity: Yes     4. Cleared for discharge by consultants (if involved): No- OT recommended tcu, SW following        Discharge Planner Nurse      Safe discharge environment identified: Yes  Barriers to discharge: Yes       Entered by: Melanie Borjas 12/16/2021 4:45 AM        Please review provider order for any additional goals.   Nurse to notify provider when observation goals have been met and patient is ready for discharge.     Denies pain. On scheduled tylenol. Patient alert to self. Patient and calm and cooperative at times but waxes and wanes due to agitation. SL. Left wrist splinted and wrapped. [place]-pocket talked in use. Up x1 with cane and gb. Bed alarm on. Patient needed IV haldol by previous shift.

## 2021-12-17 PROCEDURE — 250N000013 HC RX MED GY IP 250 OP 250 PS 637: Performed by: INTERNAL MEDICINE

## 2021-12-17 PROCEDURE — 250N000011 HC RX IP 250 OP 636: Performed by: PHYSICIAN ASSISTANT

## 2021-12-17 PROCEDURE — 250N000013 HC RX MED GY IP 250 OP 250 PS 637: Performed by: PHYSICIAN ASSISTANT

## 2021-12-17 PROCEDURE — 99225 PR SUBSEQUENT OBSERVATION CARE,LEVEL II: CPT | Performed by: PHYSICIAN ASSISTANT

## 2021-12-17 PROCEDURE — 96376 TX/PRO/DX INJ SAME DRUG ADON: CPT

## 2021-12-17 PROCEDURE — G0378 HOSPITAL OBSERVATION PER HR: HCPCS

## 2021-12-17 PROCEDURE — 99207 PR CDG-CODE CATEGORY CHANGED: CPT | Performed by: PHYSICIAN ASSISTANT

## 2021-12-17 RX ADMIN — QUETIAPINE FUMARATE 12.5 MG: 25 TABLET ORAL at 10:22

## 2021-12-17 RX ADMIN — HALOPERIDOL LACTATE 1 MG: 5 INJECTION, SOLUTION INTRAMUSCULAR at 22:10

## 2021-12-17 RX ADMIN — Medication 5 MG: at 21:20

## 2021-12-17 RX ADMIN — ACETAMINOPHEN 1000 MG: 500 TABLET, FILM COATED ORAL at 16:06

## 2021-12-17 RX ADMIN — QUETIAPINE FUMARATE 12.5 MG: 25 TABLET ORAL at 19:41

## 2021-12-17 NOTE — PLAN OF CARE
PRIMARY DIAGNOSIS: GENERALIZED WEAKNESS    OUTPATIENT/OBSERVATION GOALS TO BE MET BEFORE DISCHARGE  1. Orthostatic performed: No    2. Tolerating PO medications: Yes    3. Return to near baseline physical activity: No    4. Cleared for discharge by consultants (if involved): Yes    Discharge Planner Nurse   Safe discharge environment identified: Yes  Barriers to discharge: Yes       Entered by: Josi Carrillo 12/17/2021 11:49 AM   Pt is A/O to self. VSS, on RA. Pt has been very sleepy this morning, slept until 10am and ate breakfast. No attempts to get OOB yet this shift. Bed alarm on.   LUE is bruised and splited/ACE wrapped. Denies any pain. Up with A1. SW following for discharge plans.   Please review provider order for any additional goals.   Nurse to notify provider when observation goals have been met and patient is ready for discharge.

## 2021-12-17 NOTE — PROGRESS NOTES
[facility]    Hospitalist Progress Note      Assessment & Plan   Jewels Cortez is a 86 year old female with PMHx including dementia with behavior disturbances, sensorineural hearing loss, cataracts, recurrent falls, who was admitted 12/13/21 after an unwitnessed fall found to have a left Radial wrist fracture.     On presentation vitals were stable. She required IM haldol and ativan in the ED to facilitate exam and imaging. Labs unremarkable. Xray showed acute comminuted fracture of the distal radius with intra articular extension and mild impaction. Splint placed in the ER. NHCHT negative for acute findings. Admitted for further cares including orthopedics consultation and input for disposition assistance from therapies and social work.        1. Left Radial Wrist Fracture: s/p splint  Secondary to mechanical fall? Unwitnessed.   -Non-operative management recommended   -Elevate and ice prn  -Follow-up with hand provider at [facility] in 2 weeks for follow-up xrays and splint change.  [phone]  -analgesia Tylenol and PRN Ibuprofen     2. Dementia with Behavior Disturbances: Exacerbated in hospital setting with restless behavior, impulsitivity. Disoriented.  Patient has had progression PTA of dementia symptoms per discussion with Niece. Described as paranoia, memory loss. Not managed with psychiatric medications prior to admission.    -She has no formal power of .  Next of kin would be the patient's niece, Dulce who is also considered decision maker at this point as patient cannot make informed medical decisions.   -Has required anti-psychotics during admission to manage behaviors.  Trials of Risperdal and Haldol were ineffective thus started on scheduled seroquel.  -PT/SW following, will need placement      3. Abnormal UA: Patient had mild pyuria, unclear if she had symptoms.  Given a dose of Ceftriaxone in the ER.  Hold further antibiotics. No evidence of acute UTI and culture is  negative to date.         Michelle Willams PA-C      Interval History   Drowsy today.  No current sitter requirement.  Disoriented but redirectable.  Denies pain.  Tolerating diet.  No dysuria or diarrhea.    -Data reviewed today: I reviewed all new labs and imaging results over the last 24 hours.    Physical Exam   Temp: 99.4  F (37.4  C) Temp src: Axillary BP: 132/70 Pulse: 85   Resp: 20 SpO2: 96 % O2 Device: None (Room air)    Vitals:    12/14/21 1847 12/15/21 0235   Weight: 43.4 kg (95 lb 11.2 oz) 44.1 kg (97 lb 4.8 oz)     Vital Signs with Ranges  Temp:  [98.3  F (36.8  C)-99.4  F (37.4  C)] 99.4  F (37.4  C)  Pulse:  [72-91] 85  Resp:  [20-24] 20  BP: (120-137)/(66-70) 132/70  SpO2:  [96 %-99 %] 96 %  No intake/output data recorded.      Constitutional:Awakens to voice. Frail appearing.  Respiratory:  Normal work of breathing. Lungs clear to auscultation bilaterally, no crackles or wheezing.  Cardiovascular: Regular rate and rhythm, normal S1 and S2, and no murmur appreciated.   GI: Normal bowel sounds, soft, non-distended, non-tender.   Skin/Integument: No rashes, no cyanosis, no peripheral edema.  Neuro:  Coordination and sensation grossly intact. Speech clear. Very hard of hearing.  Extremities: No lateralizing weakness.  Left wrist splint in place.  Sensation intact in left fingers as is capillary refill.  No elbow pain.  No shoulder pain.  Psych: No overt hallucinations.              Medications       acetaminophen  1,000 mg Oral TID     melatonin  5 mg Oral At Bedtime     QUEtiapine  12.5 mg Oral BID       Data   Recent Labs   Lab 12/13/21  1803   WBC 10.7   HGB 11.7   MCV 95         POTASSIUM 3.5   CHLORIDE 110*   CO2 25   BUN 19   CR 0.54   ANIONGAP 6   MALICK 8.6   *       No results found for this or any previous visit (from the past 24 hour(s)).

## 2021-12-17 NOTE — PROGRESS NOTES
Care from 2300 to 0700    Pt alert to self. Pt trying to get OOB multiple times, setting off bed alarm. Redirected several times. Denies any pain. Up in wheelchair at nurses station, refused scheduled tylenol. Elevating LUE with pillows,. LUE splinted/ace wrapped. Ortho, PT/SW/OT following. Continue to monitor. Pt fell asleep around 0230 and resting comfortablely     Temp: 98.3  F (36.8  C) Temp src: Oral BP: 120/66 Pulse: 72   Resp: 20 SpO2: 99 % O2 Device: None (Room air)

## 2021-12-17 NOTE — PLAN OF CARE
PRIMARY DIAGNOSIS: GENERALIZED WEAKNESS    OUTPATIENT/OBSERVATION GOALS TO BE MET BEFORE DISCHARGE  1. Orthostatic performed: No    2. Tolerating PO medications: Yes    3. Return to near baseline physical activity: No     4. Cleared for discharge by consultants (if involved): Yes    Discharge Planner Nurse   Safe discharge environment identified: Yes  Barriers to discharge: Yes       Entered by: Shanta Rodriguez 12/16/2021 8:00PM     Please review provider order for any additional goals.   Nurse to notify provider when observation goals have been met and patient is ready for discharge.    Alert to self, situation and time, denies pain, calm and cooperative most shift, redirectable, L wrist splinted and wrapped, bruising to LUE, up with cane/assist of 1, IV S/L, plan for TCU, SW following, will continue to provide supportive cares.

## 2021-12-17 NOTE — PROGRESS NOTES
Care Management Follow Up    Length of Stay (days): 1    Expected Discharge Date: 12/17/2021     Concerns to be Addressed: Discharge planning     Patient plan of care discussed at interdisciplinary rounds: Yes    Anticipated Discharge Disposition: Skilled Nursing Facilty     Anticipated Discharge Services: None  Anticipated Discharge DME: None    Patient/family educated on Medicare website which has current facility and service quality ratings: yes  Education Provided on the Discharge Plan: Yes   Patient/Family in Agreement with the Plan: yes    Referrals Placed by CM/SW: Post Acute Facilities  Private pay costs discussed: Not applicable    Additional Information:  Patient has been declined from [facility], [facility], and [facility],     Additional referrals sent to: [facility], [facility], [facility], [facility], [facility], [facility], [facility], and [facility].     SW will continue to follow.     1610: Call from pt's ximena Cardona. She discussed that is may be better for SW to arrange transport at discharge. Discussed stretcher and cost associated as patient will likely require supervision. She is aware of cost and will decide closer to discharge.     REANNA Horne

## 2021-12-17 NOTE — PROGRESS NOTES
Care from 2300 to 0700    Pt alert to self. Pt trying to get OOB multiple times, setting off bed alarm. Redirected several times. Denies any pain. Up in wheelchair at nurses station, refused scheduled tylenol. Elevating LUE with pillows,. LUE splinted/ace wrapped. Ortho, PT/SW/OT following. Continue to monitor.   Temp: 98.3  F (36.8  C) Temp src: Oral BP: 120/66 Pulse: 72   Resp: 20 SpO2: 99 % O2 Device: None (Room air)

## 2021-12-17 NOTE — PLAN OF CARE
PRIMARY DIAGNOSIS: GENERALIZED WEAKNESS     OUTPATIENT/OBSERVATION GOALS TO BE MET BEFORE DISCHARGE  1. Orthostatic performed: No     2. Tolerating PO medications: Yes     3. Return to near baseline physical activity: No     4. Cleared for discharge by consultants (if involved): Yes     Discharge Planner Nurse   Safe discharge environment identified: Yes  Barriers to discharge: Yes       Pt is A/O to self. VSS, on RA. Pt has been very sleepy this morning, slept until 10am and ate breakfast. No attempts to get OOB yet this shift. Bed alarm on. Pt has been more alert as they day as progressed, no behaviors and very thankful to staff for their help.  LUE is bruised and splited/ACE wrapped. Denies any pain. Up with A1. SW following for discharge plans.     Please review provider order for any additional goals.   Nurse to notify provider when observation goals have been met and patient is ready for discharge.

## 2021-12-17 NOTE — CHILD FAMILY LIFE
CCLS was called by Nurse Manager Soha to provide consultation and support for pt.  Per Soha, pt is confused and is assessed to need some sort of activity to help keep her in room.  This writer came to floor to find pt with staff in hallway.  Staff relayed pt was very hard of hearing and confused as well.  Due to these issues and pt's hunched-over stature/posture, and pt's hair covering her face, communication was difficult.  This writer lay down on the floor in front of patient to access face-to-face interaction.  Pt stopped trying to move and attended to this writer and the group then moved pt back to room.  Pt was helped into bed.  Per staff, pt enjoyed [employer] movies, however due to pt's stature and TV being mounted high on wall, pt was unable to see program.  This writer provided an ipad on a stand and via YouTube called up a [employer] movie, enabling Closed Captioning to pt could read the screen.  CCLS provided some support to pt and staff, offering ideas and approaches to assist through confusion.  CCLS encouraged staff to call should further needs arise and will assist as able while  prioritizing pediatric patients.

## 2021-12-17 NOTE — PROGRESS NOTES
Care from 2300 to 0700    Pt alert to self. Pt trying to get OOB multiple times, setting off bed alarm. Redirected several times. Denies any pain. Up in wheelchair at nurses station.  Temp: 98.3  F (36.8  C) Temp src: Oral BP: 120/66 Pulse: 72   Resp: 20 SpO2: 99 % O2 Device: None (Room air)

## 2021-12-18 ENCOUNTER — APPOINTMENT (OUTPATIENT)
Dept: OCCUPATIONAL THERAPY | Facility: CLINIC | Age: 86
End: 2021-12-18
Payer: MEDICARE

## 2021-12-18 PROCEDURE — 99207 PR CDG-CODE CATEGORY CHANGED: CPT | Performed by: PHYSICIAN ASSISTANT

## 2021-12-18 PROCEDURE — 250N000013 HC RX MED GY IP 250 OP 250 PS 637: Performed by: INTERNAL MEDICINE

## 2021-12-18 PROCEDURE — 250N000013 HC RX MED GY IP 250 OP 250 PS 637: Performed by: PHYSICIAN ASSISTANT

## 2021-12-18 PROCEDURE — 99225 PR SUBSEQUENT OBSERVATION CARE,LEVEL II: CPT | Performed by: PHYSICIAN ASSISTANT

## 2021-12-18 PROCEDURE — G0378 HOSPITAL OBSERVATION PER HR: HCPCS

## 2021-12-18 PROCEDURE — 97530 THERAPEUTIC ACTIVITIES: CPT | Mod: GO

## 2021-12-18 RX ORDER — QUETIAPINE FUMARATE 25 MG/1
25 TABLET, FILM COATED ORAL EVERY EVENING
Status: DISCONTINUED | OUTPATIENT
Start: 2021-12-18 | End: 2021-12-20 | Stop reason: HOSPADM

## 2021-12-18 RX ADMIN — ACETAMINOPHEN 1000 MG: 500 TABLET, FILM COATED ORAL at 08:33

## 2021-12-18 RX ADMIN — QUETIAPINE FUMARATE 25 MG: 25 TABLET ORAL at 19:39

## 2021-12-18 RX ADMIN — Medication 5 MG: at 21:16

## 2021-12-18 RX ADMIN — ACETAMINOPHEN 1000 MG: 500 TABLET, FILM COATED ORAL at 17:20

## 2021-12-18 RX ADMIN — QUETIAPINE FUMARATE 12.5 MG: 25 TABLET ORAL at 08:33

## 2021-12-18 NOTE — PLAN OF CARE
PRIMARY DIAGNOSIS: GENERALIZED WEAKNESS     OUTPATIENT/OBSERVATION GOALS TO BE MET BEFORE DISCHARGE  1. Orthostatic performed: No     2. Tolerating PO medications: Yes     3. Return to near baseline physical activity: Yes     4. Cleared for discharge by consultants (if involved): Yes     Discharge Planner Nurse   Safe discharge environment identified: Yes  Barriers to discharge: Yes       Entered by: Giovanny Mcdaniel 12/18/2021 12:18 AM      /67 (BP Location: Right arm)   Pulse 77   Temp 97.7  F (36.5  C) (Oral)   Resp 18   Wt 44.1 kg (97 lb 4.8 oz)   LMP  (LMP Unknown)   SpO2 95%      Pt is alert with some confusion. Pt is on high fall risk. Pt denies pain or discomfort. VSS WNL. No acute distress noted. Pt ambulated from bed to bathroom. Pt had BM on the shift. LUE is bruised and splited/ACE wrapped, elevated with a pillow. CMS intact. Pt was up most of the night and been agitated. Pt was given Hardol. bed in lowest position, bed alarm in place and call light within reach. Will continue to monitor and assess pt.   Please review provider order for any additional goals.   Nurse to notify provider when observation goals have been met and patient is ready for discharge.

## 2021-12-18 NOTE — PROGRESS NOTES
[facility]  Hospitalist Progress Note  Candy Rodriguez PA-C 12/18/2021    Reason for Stay (Diagnosis): Left radial wrist fracture         Assessment and Plan:      Jewels Cortez is a 86 year old female with PMHx including dementia with behavior disturbances, sensorineural hearing loss, cataracts, recurrent falls, who was admitted 12/13/21 after an unwitnessed fall found to have a left Radial wrist fracture.     On presentation vitals were stable. She required IM haldol and ativan in the ED to facilitate exam and imaging. Labs unremarkable. Xray showed acute comminuted fracture of the distal radius with intra articular extension and mild impaction. Splint placed in the ER. NHCHT negative for acute findings. Admitted for further cares including orthopedics consultation and input for disposition assistance from therapies and social work.     #Left Radial Wrist Fracture s/p splint  Secondary to mechanical fall? Unwitnessed.   -Non-operative management recommended   -Elevate and ice prn  -Follow-up with hand provider at [facility] in 2 weeks for follow-up xrays and splint change.  [phone]  analgesia Tylenol and PRN Ibuprofen     #Dementia with Behavior Disturbances  Exacerbated in hospital setting with restless behavior, impulsitivity. Disoriented.  Patient has had progression PTA per discussion with Niece. Known history of behavior disturbances including paranoia.  Not managed with psychiatric medications prior to admission.  She has no formal power of . Next of kin would be the patient's niece, Dulce who is also considered decision maker at this point as patient cannot make informed medical decisions.  Quite deaf and that also makes communication assessment of mentation a challenge.  -no current sitter requirement   -Her QTC is within normal limits so will reorder antipsychotics at low dose if unable to be redirected safely for behavior disturbance  -if needed could trial Risperdal  nightly if paranoia behaviors are a concern for now redirect and monitor. Trial scheduled Seroquel 12.5 mg during day and 25 mg at night  -beside attendant prn    -up in chair TID for meals  -high risk hospital delirium  -SW is consulted, guardianship/POA not yet established formally   -will need TCU and eventually memory care v. Increased assistance at AL        #Abnormal UA  Patient had mild pyuria, unclear if she had symptoms.  Given a dose of Ceftriaxone in the ER.  Hold further antibiotics. No evidence of acute UTI and culture is negative to date.        DVT Prophylaxis: Pneumatic Compression Devices  Code Status: Full Code  Expected Discharge: 12/17/2021     Anticipated discharge location: inpatient rehabilitation facility    Delays:     Placement - TCU  Administering IV medications             Interval History (Subjective):      Patient agitated overnight receiving a dose of Haldol IV and now very sleepy this morning.  Has no complaints for me.                  Physical Exam:      Last Vital Signs:  /67 (BP Location: Right arm)   Pulse 71   Temp 97.7  F (36.5  C) (Oral)   Resp 16   Wt 44.1 kg (97 lb 4.8 oz)   LMP  (LMP Unknown)   SpO2 96%     No intake or output data in the 24 hours ending 12/18/21 1352    Constitutional: Sleepy, alert, cooperative, no apparent distress   Respiratory: Clear to auscultation bilaterally, no crackles or wheezing   Cardiovascular: Regular rate and rhythm, normal S1 and S2, and no murmur noted   Abdomen: Normal bowel sounds, soft, non-distended, non-tender   Skin: No rashes, no cyanosis, dry to touch   Neuro: Alert and oriented x3, no weakness, numbness, memory loss   Extremities: No edema, normal range of motion   Other(s):        All other systems: Negative          Medications:      All current medications were reviewed with changes reflected in problem list.         Data:      All new lab and imaging data was reviewed.   Labs:  Recent Labs   Lab 12/13/21  1803   NA  141   POTASSIUM 3.5   CHLORIDE 110*   CO2 25   ANIONGAP 6   *   BUN 19   CR 0.54   GFRESTIMATED 86   MALICK 8.6     Recent Labs   Lab 12/13/21  1803   WBC 10.7   HGB 11.7   HCT 36.6   MCV 95         Imaging:   No results found for this or any previous visit (from the past 24 hour(s)).

## 2021-12-18 NOTE — PLAN OF CARE
BP (!) 144/70 (BP Location: Right arm)   Pulse 70   Temp 97.7  F (36.5  C) (Oral)   Resp 18   Wt 44.1 kg (97 lb 4.8 oz)   LMP  (LMP Unknown)   SpO2 96%      Pt alert to self. Denies any pain. Other VSS. Afebrile. Left wrist; warm, moving all fingers. Up in chair eating her breakfast.

## 2021-12-18 NOTE — PLAN OF CARE
PRIMARY DIAGNOSIS: GENERALIZED WEAKNESS    OUTPATIENT/OBSERVATION GOALS TO BE MET BEFORE DISCHARGE  1. Orthostatic performed: No    2. Tolerating PO medications: Yes    3. Return to near baseline physical activity: Yes    4. Cleared for discharge by consultants (if involved): Yes    Discharge Planner Nurse   Safe discharge environment identified: Yes  Barriers to discharge: Yes       Entered by: Giovanny Mcdaniel 12/17/2021 9:05 PM    /67 (BP Location: Right arm)   Pulse 85   Temp 98.7  F (37.1  C) (Oral)   Resp 18   Wt 44.1 kg (97 lb 4.8 oz)   LMP  (LMP Unknown)   SpO2 96%     Pt is alert with some confusion. Pt is on high fall risk. Pt denies pain or discomfort. VSS WNL. No acute distress noted. Pt ambulated from bed to bathroom. Pt had BM on the shift. LUE is bruised and splited/ACE wrapped, elevated with a pillow. CMS intact. bed in lowest position, bed alarm in place and call light within reach. Will continue to monitor and assess pt.   Please review provider order for any additional goals.   Nurse to notify provider when observation goals have been met and patient is ready for discharge.

## 2021-12-18 NOTE — PLAN OF CARE
PRIMARY DIAGNOSIS: GENERALIZED WEAKNESS    OUTPATIENT/OBSERVATION GOALS TO BE MET BEFORE DISCHARGE  1. Orthostatic performed: No    2. Tolerating PO medications: Yes    3. Return to near baseline physical activity: Yes    4. Cleared for discharge by consultants (if involved): Yes    Discharge Planner Nurse   Safe discharge environment identified: Yes  Barriers to discharge: Yes       Entered by: Josi Carrillo 12/17/2021 6:09 PM   Pt is alert to self. VSS, on RA. Up with A1. No attempts to get OOB, bed alarm on for safety. Pt is has been very pleasant, took medications, and thanking staff for their help. No behaviors. LUE is bruised and splinted/ACE wrapped. Denies pain. Neuros intact to LUE. SW following.  Please review provider order for any additional goals.   Nurse to notify provider when observation goals have been met and patient is ready for discharge.

## 2021-12-18 NOTE — PLAN OF CARE
PRIMARY DIAGNOSIS: GENERALIZED WEAKNESS     OUTPATIENT/OBSERVATION GOALS TO BE MET BEFORE DISCHARGE  1. Orthostatic performed: No     2. Tolerating PO medications: Yes     3. Return to near baseline physical activity: Yes     4. Cleared for discharge by consultants (if involved): Yes     Discharge Planner Nurse   Safe discharge environment identified: Yes  Barriers to discharge: Yes       Entered by: Teena Feliciano 12/18/2021 1410    /67 (BP Location: Right arm)   Pulse 71   Temp 97.7  F (36.5  C) (Oral)   Resp 16   Wt 44.1 kg (97 lb 4.8 oz)   LMP  (LMP Unknown)   SpO2 96%      Pt alert to self. Denies any pain. Other VSS. Afebrile. Left wrist; warm, moving all fingers. Tolerating her regular food. Left wrist splint intact. Fingers moving well. Pulse present. Warm to touch.   Plan: to discharge inpatient rehab facility.

## 2021-12-18 NOTE — PLAN OF CARE
PRIMARY DIAGNOSIS: GENERALIZED WEAKNESS    OUTPATIENT/OBSERVATION GOALS TO BE MET BEFORE DISCHARGE  1. Orthostatic performed: NA    2. Tolerating PO medications: Yes    3. Return to near baseline physical activity: No    4. Cleared for discharge by consultants (if involved): Yes     Discharge Planner Nurse   Safe discharge environment identified: No- TCU placement   Barriers to discharge: Yes       Entered by: Leidy Novak 12/18/2021 3:58 PM     Please review provider order for any additional goals.   Nurse to notify provider when observation goals have been met and patient is ready for discharge.    Pt resting comfortably. VSS. Disoriented to situation. Pain managed with scheduled tylenol, pt denies pain. Transfers with Ax1. Tolerating reg diet. LUE splinted. CMS intact- bruising. Plan for TCU placement.

## 2021-12-19 ENCOUNTER — APPOINTMENT (OUTPATIENT)
Dept: OCCUPATIONAL THERAPY | Facility: CLINIC | Age: 86
End: 2021-12-19
Payer: MEDICARE

## 2021-12-19 PROCEDURE — 250N000013 HC RX MED GY IP 250 OP 250 PS 637: Performed by: PHYSICIAN ASSISTANT

## 2021-12-19 PROCEDURE — 99207 PR CDG-CODE CATEGORY CHANGED: CPT | Performed by: PHYSICIAN ASSISTANT

## 2021-12-19 PROCEDURE — 97535 SELF CARE MNGMENT TRAINING: CPT | Mod: GO | Performed by: OCCUPATIONAL THERAPIST

## 2021-12-19 PROCEDURE — 99226 PR SUBSEQUENT OBSERVATION CARE,LEVEL III: CPT | Performed by: PHYSICIAN ASSISTANT

## 2021-12-19 PROCEDURE — G0378 HOSPITAL OBSERVATION PER HR: HCPCS

## 2021-12-19 PROCEDURE — 250N000013 HC RX MED GY IP 250 OP 250 PS 637: Performed by: INTERNAL MEDICINE

## 2021-12-19 RX ORDER — OLANZAPINE 10 MG/2ML
5 INJECTION, POWDER, FOR SOLUTION INTRAMUSCULAR DAILY PRN
Status: DISCONTINUED | OUTPATIENT
Start: 2021-12-19 | End: 2021-12-20 | Stop reason: HOSPADM

## 2021-12-19 RX ORDER — OLANZAPINE 10 MG/2ML
INJECTION, POWDER, FOR SOLUTION INTRAMUSCULAR
Status: DISCONTINUED
Start: 2021-12-19 | End: 2021-12-19 | Stop reason: WASHOUT

## 2021-12-19 RX ADMIN — QUETIAPINE FUMARATE 12.5 MG: 25 TABLET ORAL at 17:48

## 2021-12-19 RX ADMIN — ACETAMINOPHEN 1000 MG: 500 TABLET, FILM COATED ORAL at 09:43

## 2021-12-19 RX ADMIN — ACETAMINOPHEN 1000 MG: 500 TABLET, FILM COATED ORAL at 17:48

## 2021-12-19 RX ADMIN — QUETIAPINE FUMARATE 12.5 MG: 25 TABLET ORAL at 09:42

## 2021-12-19 RX ADMIN — ACETAMINOPHEN 1000 MG: 500 TABLET, FILM COATED ORAL at 00:25

## 2021-12-19 NOTE — PROGRESS NOTES
[facility]  Hospitalist Progress Note  Candy Rodriguez PA-C 12/19/2021    Reason for Stay (Diagnosis): Left radial fracture         Assessment and Plan:      Jewels Cortez is a 86 year old female with PMHx including dementia with behavior disturbances, sensorineural hearing loss, cataracts, recurrent falls, who was admitted 12/13/21 after an unwitnessed fall found to have a left Radial wrist fracture.     On presentation vitals were stable. She required IM haldol and ativan in the ED to facilitate exam and imaging. Labs unremarkable. Xray showed acute comminuted fracture of the distal radius with intra articular extension and mild impaction. Splint placed in the ER. CT head negative for acute findings. Admitted for further cares including orthopedics consultation and input for disposition assistance from therapies and social work.    Medically stable for discharge since 12/19     #Left Radial Wrist Fracture s/p splint placement  Secondary to mechanical fall? Unwitnessed.   -Non-operative management recommended   -Elevate and ice prn  -Follow-up with hand provider at [facility] in 2 weeks (12/27) for follow-up xrays and splint change.  [phone]  -Tylenol and PRN Ibuprofen     #Dementia with Behavior Disturbances  -Lives in assisted living  -Ambulates independently with cane at baseline  -Dementia with agitation exacerbated in hospital setting with restless behavior, impulsitivity. Disoriented.  -Known history of behavior disturbances including paranoia.  Not managed with psychiatric medications prior to admission.    -She has no formal power of . Next of kin would be the patient's niece, Dulce who is also considered decision maker at this point as patient cannot make informed medical decisions.    -Quite deaf and that also makes communication assessment of mentation a challenge.  -no current sitter requirement   -Her QTC is within normal limits so will reorder antipsychotics at low  dose if unable to be redirected safely for behavior disturbance  -Started Seroquel currently receiving 12.5 mg during the day and 25 mg at night  -Avoid IV Haldol (giving it will prevent TCU from taking her)  -will need TCU and eventually memory care v. Increased assistance at AL        #Abnormal UA  Patient had mild pyuria, unclear if she had symptoms.  Given a dose of Ceftriaxone in the ER.  Hold further antibiotics. No evidence of acute UTI and culture is negative to date.        DVT Prophylaxis: Pneumatic Compression Devices  Code Status: Full Code  Expected Discharge: 12/17/2021     Anticipated discharge location: inpatient rehabilitation facility    Delays:     Placement - TCU  Administering IV medications             Interval History (Subjective):      Patient very irritated and agitated this morning.  Has no complaints.  Argues with me about when her fracture occurred and furthermore states that she has not been seen by a doctor since being here.                  Physical Exam:      Last Vital Signs:  /66 (BP Location: Right arm)   Pulse 80   Temp 98.6  F (37  C) (Oral)   Resp 16   Wt 44.1 kg (97 lb 4.8 oz)   LMP  (LMP Unknown)   SpO2 97%       Intake/Output Summary (Last 24 hours) at 12/19/2021 1221  Last data filed at 12/19/2021 0655  Gross per 24 hour   Intake 120 ml   Output --   Net 120 ml       Constitutional: Awake, alert, agitiated   Respiratory: Clear to auscultation bilaterally, no crackles or wheezing   Cardiovascular: Regular rate and rhythm, normal S1 and S2, and no murmur noted   Abdomen: Normal bowel sounds, soft, non-distended, non-tender   Skin: No rashes, no cyanosis, dry to touch   Neuro: Alert but confused, no weakness or numbness   Extremities: No edema, normal range of motion   Other(s):        All other systems: Negative          Medications:      All current medications were reviewed with changes reflected in problem list.         Data:      All new lab and imaging data  was reviewed.   Labs:  Recent Labs   Lab 12/13/21  1803      POTASSIUM 3.5   CHLORIDE 110*   CO2 25   ANIONGAP 6   *   BUN 19   CR 0.54   GFRESTIMATED 86   MALICK 8.6     Recent Labs   Lab 12/13/21  1803   WBC 10.7   HGB 11.7   HCT 36.6   MCV 95         Imaging:   Results for orders placed or performed during the hospital encounter of 12/13/21   XR Wrist Left G/E 3 Views    Narrative    EXAM: XR WRIST LEFT G/E 3 VIEWS  LOCATION: [facility]  DATE/TIME: 12/13/2021 7:59 PM    INDICATION: fall pain swelling  COMPARISON: None.      Impression    IMPRESSION: Acute comminuted fracture of the distal radius with intra-articular extension and mild impaction. There is normal joint alignment. Surrounding soft tissue swelling. Severe first CMC joint degenerative changes. Osteopenia.   Head CT w/o contrast    Narrative    EXAM: CT HEAD W/O CONTRAST  LOCATION: [facility]  DATE/TIME: 12/13/2021 7:42 PM    INDICATION: Traumatic injury. Confusion.  COMPARISON: None.  TECHNIQUE: Routine CT Head without IV contrast. Multiplanar reformats. Dose reduction techniques were used.    FINDINGS:  INTRACRANIAL CONTENTS: No intracranial hemorrhage, extraaxial collection, or mass effect.  No CT evidence of acute infarct. Mild to moderate presumed chronic small vessel ischemic changes. Mild to moderate generalized volume loss. No hydrocephalus.     VISUALIZED ORBITS/SINUSES/MASTOIDS: No intraorbital abnormality. No paranasal sinus mucosal disease. No middle ear or mastoid effusion.    BONES/SOFT TISSUES: No acute abnormality.      Impression    IMPRESSION:  1.  No acute intracranial process.   XR Chest 1 View    Narrative    EXAM: XR CHEST 1 VIEW  LOCATION: [facility]  DATE/TIME: 12/13/2021 7:59 PM    INDICATION: confusion, fall  COMPARISON: 06/22/2021       Impression    IMPRESSION: Heart size within normal limits for AP technique. Pulmonary vascularity normal.  Hyperinflated lungs. Minimal scarring or atelectasis left base. No acute infiltrates or effusions. Convex right thoracolumbar curve.

## 2021-12-19 NOTE — PROGRESS NOTES
Care Management Follow Up    Expected Discharge Date: 12/20/2021     Concerns to be Addressed: discharge planning     Patient plan of care discussed at interdisciplinary rounds: Yes    Anticipated Discharge Disposition: Skilled Nursing Facilty     Anticipated Discharge Services: None  Anticipated Discharge DME: None    Patient/family educated on Medicare website which has current facility and service quality ratings: yes  Education Provided on the Discharge Plan:  Yes  Patient/Family in Agreement with the Plan: yes-Sw has been updating Lluvia Cardona     Referrals Placed by CM/SW: Post Acute Facilities  Private pay costs discussed: Not applicable    Additional Information:  Patient declined from [facility], [facility], [facility], and [facility].  Referrals pending at: [facility], [facility], [facility], [facility], [facility], and [facility].     Additional referrals sent to: [facility] [place], [facility], [facility], [facility], [facility] and [facility].     SW will continue to follow for TCU placement. Medically stable-ready for discharge once placement found.     Lluvia vs. Stretcher (requires supervision) for transportation at discharge.     REANNA Horne

## 2021-12-19 NOTE — PLAN OF CARE
PRIMARY DIAGNOSIS: GENERALIZED WEAKNESS     OUTPATIENT/OBSERVATION GOALS TO BE MET BEFORE DISCHARGE  1. Orthostatic performed: NA     2. Tolerating PO medications: Yes     3. Return to near baseline physical activity: No     4. Cleared for discharge by consultants (if involved): Yes     Discharge Planner Nurse   Safe discharge environment identified: No  Barriers to discharge: Yes       Entered by: Giovanny Mcdaniel 12/18/2021       /68 (BP Location: Right arm)   Pulse 83   Temp 97.8  F (36.6  C) (Oral)   Resp 16   Wt 44.1 kg (97 lb 4.8 oz)   LMP  (LMP Unknown)   SpO2 98%     Pt is alert to self with confusion. Pt denies pain or discomfort. No acute distress noted. VSS. LUE is bruised and splited/ACE wrapped, elevated with a pillow. CMS intact. Pt had supper at beginning of the shift. Will continue to monitor and assess pt.      Please review provider order for any additional goals.   Nurse to notify provider when observation goals have been met and patient is ready for discharge.

## 2021-12-19 NOTE — PLAN OF CARE
PRIMARY DIAGNOSIS: GENERALIZED WEAKNESS    OUTPATIENT/OBSERVATION GOALS TO BE MET BEFORE DISCHARGE  1. Orthostatic performed: NA    2. Tolerating PO medications: Yes    3. Return to near baseline physical activity: No    4. Cleared for discharge by consultants (if involved): Yes    Discharge Planner Nurse   Safe discharge environment identified: No  Barriers to discharge: Yes       Entered by: Giovanny Mcdaniel 12/18/2021    /62 (BP Location: Right arm)   Pulse 73   Temp 97.6  F (36.4  C) (Oral)   Resp 20   Wt 44.1 kg (97 lb 4.8 oz)   LMP  (LMP Unknown)   SpO2 97%     Pt is alert to self with confusion. Pt denies pain or discomfort. No acute distress noted. VSS. LUE is bruised and splited/ACE wrapped, elevated with a pillow. CMS intact. Pt is sleeping in bed. Bed in lowest position and bed alarm in place and call light within reach. Will continue to monitor and assess pt.     Please review provider order for any additional goals.   Nurse to notify provider when observation goals have been met and patient is ready for discharge.

## 2021-12-20 VITALS
RESPIRATION RATE: 16 BRPM | SYSTOLIC BLOOD PRESSURE: 134 MMHG | TEMPERATURE: 97.7 F | WEIGHT: 97.3 LBS | DIASTOLIC BLOOD PRESSURE: 61 MMHG | HEART RATE: 72 BPM | OXYGEN SATURATION: 97 %

## 2021-12-20 PROCEDURE — 99217 PR OBSERVATION CARE DISCHARGE: CPT | Performed by: PHYSICIAN ASSISTANT

## 2021-12-20 PROCEDURE — 250N000013 HC RX MED GY IP 250 OP 250 PS 637: Performed by: INTERNAL MEDICINE

## 2021-12-20 PROCEDURE — G0378 HOSPITAL OBSERVATION PER HR: HCPCS

## 2021-12-20 PROCEDURE — 250N000013 HC RX MED GY IP 250 OP 250 PS 637: Performed by: PHYSICIAN ASSISTANT

## 2021-12-20 RX ORDER — QUETIAPINE FUMARATE 25 MG/1
12.5 TABLET, FILM COATED ORAL DAILY
DISCHARGE
Start: 2021-12-20 | End: 2021-12-20

## 2021-12-20 RX ORDER — QUETIAPINE FUMARATE 25 MG/1
25 TABLET, FILM COATED ORAL EVERY EVENING
DISCHARGE
Start: 2021-12-20 | End: 2021-12-20

## 2021-12-20 RX ORDER — ACETAMINOPHEN 500 MG
1000 TABLET ORAL 3 TIMES DAILY
DISCHARGE
Start: 2021-12-20 | End: 2022-01-01

## 2021-12-20 RX ORDER — QUETIAPINE FUMARATE 25 MG/1
25 TABLET, FILM COATED ORAL EVERY EVENING
Qty: 30 TABLET | Refills: 0 | Status: SHIPPED | OUTPATIENT
Start: 2021-12-20 | End: 2022-01-01

## 2021-12-20 RX ORDER — QUETIAPINE FUMARATE 25 MG/1
12.5 TABLET, FILM COATED ORAL DAILY
Qty: 30 TABLET | Refills: 0 | Status: SHIPPED | OUTPATIENT
Start: 2021-12-20 | End: 2022-01-01

## 2021-12-20 RX ORDER — IBUPROFEN 200 MG
200-400 TABLET ORAL EVERY 6 HOURS PRN
DISCHARGE
Start: 2021-12-20 | End: 2022-01-01

## 2021-12-20 RX ADMIN — QUETIAPINE FUMARATE 12.5 MG: 25 TABLET ORAL at 09:50

## 2021-12-20 RX ADMIN — ACETAMINOPHEN 1000 MG: 500 TABLET, FILM COATED ORAL at 02:40

## 2021-12-20 NOTE — PLAN OF CARE
[id number]    Inpatient Progress Note:    /68 (BP Location: Right arm)   Pulse 70   Temp 98.6  F (37  C) (Oral)   Resp 16   Wt 44.1 kg (97 lb 4.8 oz)   LMP  (LMP Unknown)   SpO2 96%      Pt alert to self only. Slept throughout most of night. Pleasant and cooperative when up to use bathroom. Denies pain. Left wrist splinted. Left thumb bruised. CMS intact. Regular diet. PIV SL. SW following for TCU placement. Will provide supportive cares.

## 2021-12-20 NOTE — PROGRESS NOTES
Patient's After Visit Summary was reviewed with patient   Patient verbalized understanding of After Visit Summary, recommended follow up and was given an opportunity to ask questions.   Discharge medications sent home with patient/family: Not applicable- scripts sent with pt in discharge packet for facility   Discharged with niece    OBSERVATION patient END time: 1211

## 2021-12-20 NOTE — PROGRESS NOTES
PRIMARY DIAGNOSIS: GENERALIZED WEAKNESS    OUTPATIENT/OBSERVATION GOALS TO BE MET BEFORE DISCHARGE  1. Orthostatic performed: No    2. Tolerating PO medications: Yes    3. Return to near baseline physical activity: No- Up SBA with cane, now ind with cane     4. Cleared for discharge by consultants (if involved): No- PT recommended tcu, SW following     Discharge Planner Nurse   Safe discharge environment identified: Yes  Barriers to discharge: Yes       Entered by: Melanie Borjas 12/20/2021 9:41 AM     Please review provider order for any additional goals.   Nurse to notify provider when observation goals have been met and patient is ready for discharge.    Patient leaving at noon today to TCU via niece. Pt slept soundly throughout the night until 9:30 am. SL. Left wrist in brace. Regular diet ordered. SW following. Up SBA with cane.

## 2021-12-20 NOTE — PROGRESS NOTES
[id number]  VSS. Pt oriented to self. Up a x 1, did not get OOB. PIV SL. Agitated when awake. Refused HS seroquel and melatonin. Spilled shake on cast, cleaned around site. Discharge pending.

## 2021-12-20 NOTE — PLAN OF CARE
PRIMARY DIAGNOSIS: GENERALIZED WEAKNESS    OUTPATIENT/OBSERVATION GOALS TO BE MET BEFORE DISCHARGE  1. Orthostatic performed: N/A    2. Tolerating PO medications: Yes    3. Return to near baseline physical activity: Yes    4. Cleared for discharge by consultants (if involved): No- TCU placement    Discharge Planner Nurse   Safe discharge environment identified: No  Barriers to discharge: Yes       Entered by: Leidy Novak 12/19/2021 9:30 AM     Please review provider order for any additional goals.   Nurse to notify provider when observation goals have been met and patient is ready for discharge.

## 2021-12-20 NOTE — PLAN OF CARE
PRIMARY DIAGNOSIS: GENERALIZED WEAKNESS    OUTPATIENT/OBSERVATION GOALS TO BE MET BEFORE DISCHARGE  1. Orthostatic performed: N/A    2. Tolerating PO medications: Yes    3. Return to near baseline physical activity: Yes    4. Cleared for discharge by consultants (if involved): No- TCU placement    Discharge Planner Nurse   Safe discharge environment identified: No  Barriers to discharge: Yes       Entered by: Leidy Novak 12/19/2021 12:45 PM     Please review provider order for any additional goals.   Nurse to notify provider when observation goals have been met and patient is ready for discharge.

## 2021-12-20 NOTE — PROGRESS NOTES
NUTRITION ASSESSMENT    REASON FOR NUTRITION CONSULT:  LOS     ASSESSMENT:  Unable to complete nutrition assessment due to patient observation status     FOLLOW UP:   Will follow up when patient transitioned to inpatient status.     Ashlee Aj, GALA, LD  Clinical Dietitian

## 2021-12-20 NOTE — PROGRESS NOTES
Care Management Discharge Note    Discharge Date: 12/20/2021     Discharge Disposition: [facility]    Discharge Services: PT/OT    Discharge DME: None    Discharge Transportation: family or friend will provide    Private pay costs discussed: transportation costs    PAS Confirmation Code:  [id number]  Patient/family educated on Medicare website which has current facility and service quality ratings: yes    Education Provided on the Discharge Plan:  Yes  Persons Notified of Discharge Plans: Patient's [facility] admissions  Patient/Family in Agreement with the Plan: yes    Handoff Referral Completed: Yes    Additional Information:  [facility] patient in to their cognitive TCU unit, bed available today. PA updated. PAS completed. SW will continue to follow.     Lluvia Moreno will transport from main entrance at 12pm. She will call the RN station when she arrives.     REANNA Horne

## 2021-12-20 NOTE — PLAN OF CARE
PRIMARY DIAGNOSIS: Confusion  OUTPATIENT/OBSERVATION GOALS TO BE MET BEFORE DISCHARGE:  1. ADLs back to baseline: No    2. Activity and level of assistance: Assist of 1    3. Pain status: Does not appear to be in pain. Sleeping in bed.    4. Return to near baseline physical activity: No     Discharge Planner Nurse   Safe discharge environment identified: No  Barriers to discharge: Yes, needs TCU placement       Entered by: Ivone Quiroga 12/20/2021 12:49 AM       Pt is currently sleeping. Sleep promoted to reduce agitation and confusion.     Please review provider order for any additional goals.   Nurse to notify provider when observation goals have been met and patient is ready for discharge.

## 2021-12-20 NOTE — PLAN OF CARE
PRIMARY DIAGNOSIS: GENERALIZED WEAKNESS    OUTPATIENT/OBSERVATION GOALS TO BE MET BEFORE DISCHARGE  1. Orthostatic performed: N/A    2. Tolerating PO medications: Yes    3. Return to near baseline physical activity: Yes    4. Cleared for discharge by consultants (if involved): No- TCU placement    Discharge Planner Nurse   Safe discharge environment identified: No  Barriers to discharge: Yes       Entered by: Leidy Novak 12/19/2021 5:00 PM     Please review provider order for any additional goals.   Nurse to notify provider when observation goals have been met and patient is ready for discharge.    Pt resting comfortably. VSS. A&O to self. Tolerating reg diet. PRN Seroquel given. Pain managed with scheduled tylenol. Transfers with Ax1. Awaiting TCU placement.

## 2021-12-20 NOTE — DISCHARGE SUMMARY
[facility]  Hospitalist Discharge Summary      Date of Admission:  12/13/2021  Date of Discharge:  12/20/2021 12:24 PM  Discharging Provider: Mariana Verma PA-C      Discharge Diagnoses   Presumed fall  Left radial wrist fracture  Dementia with behavioral disturbances    Follow-ups Needed After Discharge   Follow-up Appointments     Follow Up and recommended labs and tests      Follow up with primary care provider in 1-2 weeks.  No follow up labs or   test are needed.  Follow up with hand provider at [facility] in 2 weeks (12/27) for follow-up xrays   and splint change.  [phone]             Unresulted Labs Ordered in the Past 30 Days of this Admission     No orders found from 11/13/2021 to 12/14/2021.      These results will be followed up by PCP    Discharge Disposition   Discharged to rehabilitation facility  Condition at discharge: Stable  Patient ready to discharge to a skilled nursing facility as soon as possible in order to create capacity for patients related to the COVID-19 pandemic.    Hospital Course   Jewels Cortez is a 86 year old female with PMHx including dementia with behavior disturbances, sensorineural hearing loss, cataracts, recurrent falls, who was admitted 12/13/21 after an unwitnessed fall found to have a left Radial wrist fracture.     On presentation vitals were stable. She required IM haldol and ativan in the ED to facilitate exam and imaging. Labs unremarkable. Xray showed acute comminuted fracture of the distal radius with intra articular extension and mild impaction. Splint placed in the ER. CT head negative for acute findings. Admitted for further cares including orthopedics consultation and input for disposition assistance from therapies and social work.  Patient was offered pain control supportive cares.  States recommend nonoperative management with a splint and follow-up in 2 weeks.  Patient did have some behavioral disturbances while here and was started  on Seroquel twice daily.  This was effective.  Occupational therapy consulted and recommended TCU.  Social work consulted to assist with placement.  Patient discharged to [facility] to TCU and will follow with PCP in 1 to 2 weeks.     #Left Radial Wrist Fracture s/p splint placement  Secondary to mechanical fall? Unwitnessed.   -Non-operative management recommended   -Continue to elevate and ice prn  -Follow-up with hand provider at [facility] in 2 weeks (12/27) for follow-up xrays and splint change.  [phone]  -Continue Tylenol and PRN Ibuprofen     #Dementia with Behavior Disturbances  -Lives in assisted living  -Ambulates independently with cane at baseline  -Dementia with agitation exacerbated in hospital setting with restless behavior, impulsitivity. Disoriented.  -Known history of behavior disturbances including paranoia.  Not managed with psychiatric medications prior to admission.     -Quite deaf and that also makes communication assessment of mentation a challenge.  -no sitter required  -Started Seroquel 12.5 mg during the day and 25 mg at night  -Discharged to TCU.  May eventually need memory care v. Increased assistance at AL        #Abnormal UA  Patient had mild pyuria, unclear if she had symptoms.  Given a dose of Ceftriaxone in the ER.  Hold further antibiotics. No evidence of acute UTI and culture is negative to date.    COVID-19 negative    Consultations This Hospital Stay   ORTHOPEDIC SURGERY IP CONSULT  CARE MANAGEMENT / SOCIAL WORK IP CONSULT  OCCUPATIONAL THERAPY ADULT IP CONSULT  OCCUPATIONAL THERAPY ADULT IP CONSULT  PHYSICAL THERAPY ADULT IP CONSULT    Code Status   Full Code    Time Spent on this Encounter   I, Mariana Verma PA-C, personally saw the patient today and spent less than or equal to 30 minutes discharging this patient.       Mariana Verma PA-C  [facility] OBSERVATION DEPT  [facility] [address] [place] [address]  Phone:  [phone]  ______________________________________________________________________    Physical Exam   Vital Signs: Temp: 98.6  F (37  C) Temp src: Oral BP: 133/68 Pulse: 70   Resp: 16 SpO2: 96 % O2 Device: None (Room air)    Weight: 97 lbs 4.8 oz     GENERAL:  Comfortable.  PSYCH: pleasant, No acute distress.  HEART:  Normal S1, S2 with no murmur, no pericardial rub, gallops or S3 or S4.  LUNGS:  Clear to auscultation, normal Respiratory effort. No wheezing, rales or ronchi.  GI:  Soft, normal bowel sounds. Non-tender, non distended.   EXTREMITIES:  Able to move all 4 extremities independently and spontaneously.  SKIN:  Dry to touch, No rash, wound or ulcerations.  NEUROLOGIC:  Grossly intact          Primary Care Physician   Holden Block    Discharge Orders      Anti-Embolism Stockings    Bilateral below knee length.On in the morning, off at night     Care Coordination Referral      General info for SNF    Length of Stay Estimate: Short Term Care: Estimated # of Days <30  Condition at Discharge: Stable  Level of care:skilled   Rehabilitation Potential: Good  Admission H&P remains valid and up-to-date: Yes  Recent Chemotherapy: N/A  Use Nursing Home Standing Orders: Yes     Mantoux instructions    Give two-step Mantoux (PPD) Per Facility Policy Yes     Follow Up and recommended labs and tests    Follow up with primary care provider in 1-2 weeks.  No follow up labs or test are needed.  Follow up with hand provider at [facility] in 2 weeks (12/27) for follow-up xrays and splint change.  [phone]     Reason for your hospital stay    Fall resulting in L radial wrist fracture. Orthopedic surgery consulted and recommended non operative management. Left wrist placed in splint. Patient has a history of dementia and behaviors were exacerbated by hospitalization. These were improved with oral Seroquel. OT consulted and recommended TCU. SW assisted with placement.     Activity - Up with nursing assistance     Weight  bearing status    Non weightbearing on L upper extremity     Full Code     Occupational Therapy Adult Consult    Evaluate and treat as clinically indicated.    Reason:  fall, L wrist fx     Physical Therapy Adult Consult    Evaluate and treat as clinically indicated.    Reason:  fall, L wrist fx     Fall precautions     Diet    Follow this diet upon discharge: Regular       Significant Results and Procedures   Most Recent 3 CBC's:Recent Labs   Lab Test 12/13/21  1803   WBC 10.7   HGB 11.7   MCV 95        Most Recent 3 BMP's:Recent Labs   Lab Test 12/13/21  1803      POTASSIUM 3.5   CHLORIDE 110*   CO2 25   BUN 19   CR 0.54   ANIONGAP 6   MALICK 8.6   *     Most Recent 2 LFT's:No lab results found.  Most Recent 6 Bacteria Isolates From Any Culture (See EPIC Reports for Culture Details):No lab results found.  Most Recent Urinalysis:Recent Labs   Lab Test 12/13/21  1708   COLOR Light Yellow   APPEARANCE Clear   URINEGLC 50 *   URINEBILI Negative   URINEKETONE Negative   SG 1.021   UBLD Negative   URINEPH 6.5   PROTEIN 10 *   NITRITE Negative   LEUKEST Small*   RBCU 1   WBCU 9*   ,   Results for orders placed or performed during the hospital encounter of 12/13/21   XR Wrist Left G/E 3 Views    Narrative    EXAM: XR WRIST LEFT G/E 3 VIEWS  LOCATION: [facility]  DATE/TIME: 12/13/2021 7:59 PM    INDICATION: fall pain swelling  COMPARISON: None.      Impression    IMPRESSION: Acute comminuted fracture of the distal radius with intra-articular extension and mild impaction. There is normal joint alignment. Surrounding soft tissue swelling. Severe first CMC joint degenerative changes. Osteopenia.   Head CT w/o contrast    Narrative    EXAM: CT HEAD W/O CONTRAST  LOCATION: [facility]  DATE/TIME: 12/13/2021 7:42 PM    INDICATION: Traumatic injury. Confusion.  COMPARISON: None.  TECHNIQUE: Routine CT Head without IV contrast. Multiplanar reformats. Dose reduction  techniques were used.    FINDINGS:  INTRACRANIAL CONTENTS: No intracranial hemorrhage, extraaxial collection, or mass effect.  No CT evidence of acute infarct. Mild to moderate presumed chronic small vessel ischemic changes. Mild to moderate generalized volume loss. No hydrocephalus.     VISUALIZED ORBITS/SINUSES/MASTOIDS: No intraorbital abnormality. No paranasal sinus mucosal disease. No middle ear or mastoid effusion.    BONES/SOFT TISSUES: No acute abnormality.      Impression    IMPRESSION:  1.  No acute intracranial process.   XR Chest 1 View    Narrative    EXAM: XR CHEST 1 VIEW  LOCATION: [facility]  DATE/TIME: 12/13/2021 7:59 PM    INDICATION: confusion, fall  COMPARISON: 06/22/2021       Impression    IMPRESSION: Heart size within normal limits for AP technique. Pulmonary vascularity normal. Hyperinflated lungs. Minimal scarring or atelectasis left base. No acute infiltrates or effusions. Convex right thoracolumbar curve.       Discharge Medications   Current Discharge Medication List      START taking these medications    Details   ibuprofen (ADVIL/MOTRIN) 200 MG tablet Take 1-2 tablets (200-400 mg) by mouth every 6 hours as needed for moderate pain    Associated Diagnoses: Wrist fracture, left, closed, initial encounter      !! QUEtiapine (SEROQUEL) 25 MG tablet Take 0.5 tablets (12.5 mg) by mouth daily    Associated Diagnoses: Dementia without behavioral disturbance, unspecified dementia type (H)      !! QUEtiapine (SEROQUEL) 25 MG tablet Take 1 tablet (25 mg) by mouth every evening    Associated Diagnoses: Dementia without behavioral disturbance, unspecified dementia type (H)       !! - Potential duplicate medications found. Please discuss with provider.      CONTINUE these medications which have CHANGED    Details   acetaminophen (ACETAMINOPHEN EXTRA STRENGTH) 500 MG tablet Take 2 tablets (1,000 mg) by mouth 3 times daily    Associated Diagnoses: Wrist fracture, left, closed,  initial encounter           Allergies   Allergies   Allergen Reactions     Penicillins Unknown     Sulfa Drugs Unknown

## 2021-12-21 NOTE — PLAN OF CARE
Occupational Therapy Discharge Summary    Reason for therapy discharge:    Discharged to transitional care facility.    Progress towards therapy goal(s). See goals on Care Plan in [facility] electronic health record for goal details.  Goals not met.  Barriers to achieving goals:   discharge from facility.    Therapy recommendation(s):    Continued therapy is recommended.  Rationale/Recommendations:  OT eval and treat at TCU.      **Pt not seen by discharging therapist on this date, note written based on previous treating therapist's notes and recommendations

## 2022-01-01 ENCOUNTER — TELEPHONE (OUTPATIENT)
Dept: GERIATRICS | Facility: CLINIC | Age: 87
End: 2022-01-01

## 2022-01-01 ENCOUNTER — ASSISTED LIVING VISIT (OUTPATIENT)
Dept: GERIATRICS | Facility: CLINIC | Age: 87
End: 2022-01-01
Payer: MEDICARE

## 2022-01-01 ENCOUNTER — APPOINTMENT (OUTPATIENT)
Dept: GENERAL RADIOLOGY | Facility: CLINIC | Age: 87
End: 2022-01-01
Attending: EMERGENCY MEDICINE
Payer: MEDICARE

## 2022-01-01 ENCOUNTER — LAB REQUISITION (OUTPATIENT)
Dept: LAB | Facility: CLINIC | Age: 87
End: 2022-01-01
Payer: MEDICARE

## 2022-01-01 ENCOUNTER — DOCUMENTATION ONLY (OUTPATIENT)
Dept: OTHER | Facility: CLINIC | Age: 87
End: 2022-01-01

## 2022-01-01 ENCOUNTER — ANESTHESIA (OUTPATIENT)
Dept: SURGERY | Facility: CLINIC | Age: 87
DRG: 481 | End: 2022-01-01
Payer: MEDICARE

## 2022-01-01 ENCOUNTER — HOSPITAL ENCOUNTER (INPATIENT)
Facility: CLINIC | Age: 87
LOS: 5 days | Discharge: HOME HEALTH | DRG: 481 | End: 2022-11-11
Attending: EMERGENCY MEDICINE | Admitting: STUDENT IN AN ORGANIZED HEALTH CARE EDUCATION/TRAINING PROGRAM
Payer: MEDICARE

## 2022-01-01 ENCOUNTER — ANESTHESIA EVENT (OUTPATIENT)
Dept: SURGERY | Facility: CLINIC | Age: 87
DRG: 481 | End: 2022-01-01
Payer: MEDICARE

## 2022-01-01 ENCOUNTER — APPOINTMENT (OUTPATIENT)
Dept: PHYSICAL THERAPY | Facility: CLINIC | Age: 87
DRG: 481 | End: 2022-01-01
Payer: MEDICARE

## 2022-01-01 ENCOUNTER — APPOINTMENT (OUTPATIENT)
Dept: GENERAL RADIOLOGY | Facility: CLINIC | Age: 87
DRG: 481 | End: 2022-01-01
Attending: EMERGENCY MEDICINE
Payer: MEDICARE

## 2022-01-01 ENCOUNTER — MEDICAL CORRESPONDENCE (OUTPATIENT)
Dept: MEDSURG UNIT | Facility: CLINIC | Age: 87
End: 2022-01-01

## 2022-01-01 ENCOUNTER — APPOINTMENT (OUTPATIENT)
Dept: CT IMAGING | Facility: CLINIC | Age: 87
End: 2022-01-01
Attending: EMERGENCY MEDICINE
Payer: MEDICARE

## 2022-01-01 ENCOUNTER — APPOINTMENT (OUTPATIENT)
Dept: GENERAL RADIOLOGY | Facility: CLINIC | Age: 87
DRG: 481 | End: 2022-01-01
Attending: ORTHOPAEDIC SURGERY
Payer: MEDICARE

## 2022-01-01 ENCOUNTER — HOSPITAL ENCOUNTER (OUTPATIENT)
Facility: CLINIC | Age: 87
Setting detail: OBSERVATION
Discharge: NURSING, REHAB OR LONG-TERM CARE | End: 2022-11-05
Attending: EMERGENCY MEDICINE | Admitting: STUDENT IN AN ORGANIZED HEALTH CARE EDUCATION/TRAINING PROGRAM
Payer: MEDICARE

## 2022-01-01 VITALS
HEART RATE: 63 BPM | SYSTOLIC BLOOD PRESSURE: 137 MMHG | RESPIRATION RATE: 16 BRPM | BODY MASS INDEX: 18.55 KG/M2 | HEIGHT: 60 IN | RESPIRATION RATE: 20 BRPM | HEART RATE: 71 BPM | DIASTOLIC BLOOD PRESSURE: 61 MMHG | SYSTOLIC BLOOD PRESSURE: 128 MMHG | OXYGEN SATURATION: 92 % | OXYGEN SATURATION: 93 % | DIASTOLIC BLOOD PRESSURE: 69 MMHG

## 2022-01-01 VITALS
HEIGHT: 60 IN | RESPIRATION RATE: 16 BRPM | OXYGEN SATURATION: 93 % | BODY MASS INDEX: 20.31 KG/M2 | DIASTOLIC BLOOD PRESSURE: 64 MMHG | HEART RATE: 65 BPM | SYSTOLIC BLOOD PRESSURE: 111 MMHG

## 2022-01-01 VITALS
HEART RATE: 71 BPM | TEMPERATURE: 97.4 F | RESPIRATION RATE: 18 BRPM | OXYGEN SATURATION: 94 % | SYSTOLIC BLOOD PRESSURE: 137 MMHG | DIASTOLIC BLOOD PRESSURE: 66 MMHG

## 2022-01-01 VITALS
HEART RATE: 66 BPM | SYSTOLIC BLOOD PRESSURE: 142 MMHG | RESPIRATION RATE: 18 BRPM | OXYGEN SATURATION: 95 % | DIASTOLIC BLOOD PRESSURE: 71 MMHG

## 2022-01-01 VITALS
HEIGHT: 60 IN | BODY MASS INDEX: 18.55 KG/M2 | SYSTOLIC BLOOD PRESSURE: 122 MMHG | RESPIRATION RATE: 18 BRPM | OXYGEN SATURATION: 92 % | HEART RATE: 70 BPM | DIASTOLIC BLOOD PRESSURE: 67 MMHG

## 2022-01-01 VITALS
RESPIRATION RATE: 18 BRPM | OXYGEN SATURATION: 93 % | DIASTOLIC BLOOD PRESSURE: 66 MMHG | HEART RATE: 78 BPM | TEMPERATURE: 98.1 F | SYSTOLIC BLOOD PRESSURE: 120 MMHG

## 2022-01-01 VITALS
TEMPERATURE: 97.7 F | SYSTOLIC BLOOD PRESSURE: 125 MMHG | HEIGHT: 60 IN | OXYGEN SATURATION: 98 % | WEIGHT: 104.7 LBS | BODY MASS INDEX: 20.55 KG/M2 | RESPIRATION RATE: 16 BRPM | HEART RATE: 86 BPM | DIASTOLIC BLOOD PRESSURE: 65 MMHG

## 2022-01-01 VITALS
HEART RATE: 69 BPM | DIASTOLIC BLOOD PRESSURE: 74 MMHG | BODY MASS INDEX: 20.31 KG/M2 | SYSTOLIC BLOOD PRESSURE: 129 MMHG | OXYGEN SATURATION: 93 % | RESPIRATION RATE: 16 BRPM | HEIGHT: 60 IN

## 2022-01-01 VITALS
DIASTOLIC BLOOD PRESSURE: 77 MMHG | HEIGHT: 60 IN | HEART RATE: 65 BPM | RESPIRATION RATE: 20 BRPM | OXYGEN SATURATION: 98 % | BODY MASS INDEX: 18.55 KG/M2 | SYSTOLIC BLOOD PRESSURE: 143 MMHG

## 2022-01-01 VITALS
DIASTOLIC BLOOD PRESSURE: 67 MMHG | HEIGHT: 60 IN | TEMPERATURE: 97.9 F | SYSTOLIC BLOOD PRESSURE: 121 MMHG | BODY MASS INDEX: 18.55 KG/M2 | RESPIRATION RATE: 18 BRPM | OXYGEN SATURATION: 93 % | HEART RATE: 72 BPM

## 2022-01-01 VITALS
SYSTOLIC BLOOD PRESSURE: 134 MMHG | HEIGHT: 60 IN | OXYGEN SATURATION: 94 % | RESPIRATION RATE: 16 BRPM | TEMPERATURE: 97.2 F | HEART RATE: 70 BPM | BODY MASS INDEX: 18.55 KG/M2 | DIASTOLIC BLOOD PRESSURE: 80 MMHG

## 2022-01-01 VITALS
HEART RATE: 76 BPM | DIASTOLIC BLOOD PRESSURE: 75 MMHG | SYSTOLIC BLOOD PRESSURE: 129 MMHG | RESPIRATION RATE: 18 BRPM | OXYGEN SATURATION: 93 %

## 2022-01-01 VITALS
OXYGEN SATURATION: 92 % | HEART RATE: 74 BPM | HEIGHT: 60 IN | RESPIRATION RATE: 18 BRPM | DIASTOLIC BLOOD PRESSURE: 66 MMHG | SYSTOLIC BLOOD PRESSURE: 135 MMHG | BODY MASS INDEX: 18.55 KG/M2

## 2022-01-01 VITALS
WEIGHT: 104 LBS | OXYGEN SATURATION: 93 % | SYSTOLIC BLOOD PRESSURE: 104 MMHG | HEART RATE: 78 BPM | BODY MASS INDEX: 20.31 KG/M2 | DIASTOLIC BLOOD PRESSURE: 64 MMHG | RESPIRATION RATE: 16 BRPM

## 2022-01-01 VITALS
BODY MASS INDEX: 21.99 KG/M2 | OXYGEN SATURATION: 95 % | DIASTOLIC BLOOD PRESSURE: 52 MMHG | HEART RATE: 63 BPM | WEIGHT: 112 LBS | SYSTOLIC BLOOD PRESSURE: 132 MMHG | HEIGHT: 60 IN | TEMPERATURE: 97 F | RESPIRATION RATE: 18 BRPM

## 2022-01-01 DIAGNOSIS — M40.00 ACQUIRED POSTURAL KYPHOSIS: ICD-10-CM

## 2022-01-01 DIAGNOSIS — F02.818 LATE ONSET ALZHEIMER'S DEMENTIA WITH BEHAVIORAL DISTURBANCE (H): ICD-10-CM

## 2022-01-01 DIAGNOSIS — R29.898 WEAKNESS OF BOTH LOWER EXTREMITIES: ICD-10-CM

## 2022-01-01 DIAGNOSIS — R41.0 CONFUSION: ICD-10-CM

## 2022-01-01 DIAGNOSIS — G30.1 LATE ONSET ALZHEIMER'S DEMENTIA WITH BEHAVIORAL DISTURBANCE (H): ICD-10-CM

## 2022-01-01 DIAGNOSIS — R53.1 GENERALIZED WEAKNESS: ICD-10-CM

## 2022-01-01 DIAGNOSIS — S72.142A DISPLACED INTERTROCHANTERIC FRACTURE OF LEFT FEMUR, INITIAL ENCOUNTER FOR CLOSED FRACTURE (H): ICD-10-CM
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
## 2022-01-01 DIAGNOSIS — R53.83 LETHARGY: Primary | ICD-10-CM

## 2022-01-01 DIAGNOSIS — M25.561 ACUTE PAIN OF RIGHT KNEE: ICD-10-CM

## 2022-01-01 DIAGNOSIS — R53.83 FATIGUE, UNSPECIFIED TYPE: Primary | ICD-10-CM

## 2022-01-01 DIAGNOSIS — D62 ANEMIA DUE TO BLOOD LOSS, ACUTE: ICD-10-CM

## 2022-01-01 DIAGNOSIS — F03.91 DEMENTIA WITH BEHAVIORAL DISTURBANCE, UNSPECIFIED DEMENTIA TYPE: Primary | ICD-10-CM

## 2022-01-01 DIAGNOSIS — F02.818 DEMENTIA ASSOCIATED WITH OTHER UNDERLYING DISEASE WITH BEHAVIORAL DISTURBANCE (H): ICD-10-CM

## 2022-01-01 DIAGNOSIS — D64.9 ANEMIA, UNSPECIFIED: ICD-10-CM

## 2022-01-01 DIAGNOSIS — M79.89 RIGHT LEG SWELLING: ICD-10-CM

## 2022-01-01 DIAGNOSIS — M25.561 ACUTE PAIN OF RIGHT KNEE: Primary | ICD-10-CM

## 2022-01-01 DIAGNOSIS — R52 PAIN: Primary | ICD-10-CM

## 2022-01-01 DIAGNOSIS — F22 PARANOIA (H): ICD-10-CM

## 2022-01-01 DIAGNOSIS — F03.91 DEMENTIA WITH BEHAVIORAL DISTURBANCE, UNSPECIFIED DEMENTIA TYPE: ICD-10-CM

## 2022-01-01 DIAGNOSIS — F41.9 ANXIETY: Primary | ICD-10-CM

## 2022-01-01 DIAGNOSIS — R29.6 RECURRENT FALLS: ICD-10-CM

## 2022-01-01 DIAGNOSIS — F03.90 DEMENTIA WITHOUT BEHAVIORAL DISTURBANCE (H): Primary | ICD-10-CM

## 2022-01-01 DIAGNOSIS — R52 PAIN: ICD-10-CM

## 2022-01-01 DIAGNOSIS — R60.0 BILATERAL LEG EDEMA: Primary | ICD-10-CM

## 2022-01-01 DIAGNOSIS — I48.91 ATRIAL FIBRILLATION, UNSPECIFIED TYPE (H): ICD-10-CM

## 2022-01-01 DIAGNOSIS — R29.898 WEAKNESS OF BOTH LOWER EXTREMITIES: Primary | ICD-10-CM

## 2022-01-01 DIAGNOSIS — R41.0 DISORIENTATION, UNSPECIFIED: ICD-10-CM

## 2022-01-01 LAB
ABO/RH(D): NORMAL
ALBUMIN SERPL BCG-MCNC: 4 G/DL (ref 3.5–5.2)
ALBUMIN UR-MCNC: 20 MG/DL
ALBUMIN UR-MCNC: 30 MG/DL
ALBUMIN UR-MCNC: 50 MG/DL
ALP SERPL-CCNC: 109 U/L (ref 35–104)
ALT SERPL W P-5'-P-CCNC: 11 U/L (ref 10–35)
AMMONIA PLAS-SCNC: 26 UMOL/L (ref 11–51)
AMMONIA PLAS-SCNC: NORMAL UMOL/L
AMORPH CRY #/AREA URNS HPF: ABNORMAL /HPF
ANION GAP SERPL CALCULATED.3IONS-SCNC: 10 MMOL/L (ref 7–15)
ANION GAP SERPL CALCULATED.3IONS-SCNC: 11 MMOL/L (ref 7–15)
ANION GAP SERPL CALCULATED.3IONS-SCNC: 12 MMOL/L (ref 7–15)
ANION GAP SERPL CALCULATED.3IONS-SCNC: 8 MMOL/L (ref 7–15)
ANTIBODY SCREEN: NEGATIVE
APPEARANCE UR: ABNORMAL
AST SERPL W P-5'-P-CCNC: 20 U/L (ref 10–35)
ATRIAL RATE - MUSE: 82 BPM
ATRIAL RATE - MUSE: 88 BPM
BACTERIA UR CULT: NO GROWTH
BACTERIA UR CULT: NORMAL
BACTERIA UR CULT: NORMAL
BASOPHILS # BLD AUTO: 0 10E3/UL (ref 0–0.2)
BASOPHILS # BLD AUTO: 0 10E3/UL (ref 0–0.2)
BASOPHILS # BLD AUTO: 0.1 10E3/UL (ref 0–0.2)
BASOPHILS NFR BLD AUTO: 0 %
BASOPHILS NFR BLD AUTO: 1 %
BASOPHILS NFR BLD AUTO: 1 %
BILIRUB SERPL-MCNC: 0.4 MG/DL
BILIRUB UR QL STRIP: NEGATIVE
BLD PROD TYP BPU: NORMAL
BLOOD COMPONENT TYPE: NORMAL
BUN SERPL-MCNC: 10.1 MG/DL (ref 8–23)
BUN SERPL-MCNC: 11.8 MG/DL (ref 8–23)
BUN SERPL-MCNC: 13.2 MG/DL (ref 8–23)
BUN SERPL-MCNC: 13.8 MG/DL (ref 8–23)
CALCIUM SERPL-MCNC: 8.3 MG/DL (ref 8.8–10.2)
CALCIUM SERPL-MCNC: 8.4 MG/DL (ref 8.8–10.2)
CALCIUM SERPL-MCNC: 8.7 MG/DL (ref 8.8–10.2)
CALCIUM SERPL-MCNC: 8.8 MG/DL (ref 8.8–10.2)
CHLORIDE SERPL-SCNC: 103 MMOL/L (ref 98–107)
CHLORIDE SERPL-SCNC: 104 MMOL/L (ref 98–107)
CHLORIDE SERPL-SCNC: 105 MMOL/L (ref 98–107)
CHLORIDE SERPL-SCNC: 99 MMOL/L (ref 98–107)
CODING SYSTEM: NORMAL
COLOR UR AUTO: YELLOW
CREAT SERPL-MCNC: 0.36 MG/DL (ref 0.51–0.95)
CREAT SERPL-MCNC: 0.39 MG/DL (ref 0.51–0.95)
CREAT SERPL-MCNC: 0.45 MG/DL (ref 0.51–0.95)
CREAT SERPL-MCNC: 0.46 MG/DL (ref 0.51–0.95)
CREAT SERPL-MCNC: 0.47 MG/DL (ref 0.51–0.95)
CREAT SERPL-MCNC: 0.5 MG/DL (ref 0.51–0.95)
CROSSMATCH: NORMAL
DEPRECATED CALCIDIOL+CALCIFEROL SERPL-MC: <19 UG/L (ref 20–75)
DEPRECATED HCO3 PLAS-SCNC: 25 MMOL/L (ref 22–29)
DEPRECATED HCO3 PLAS-SCNC: 26 MMOL/L (ref 22–29)
DIASTOLIC BLOOD PRESSURE - MUSE: NORMAL MMHG
DIASTOLIC BLOOD PRESSURE - MUSE: NORMAL MMHG
EOSINOPHIL # BLD AUTO: 0 10E3/UL (ref 0–0.7)
EOSINOPHIL # BLD AUTO: 0 10E3/UL (ref 0–0.7)
EOSINOPHIL # BLD AUTO: 0.1 10E3/UL (ref 0–0.7)
EOSINOPHIL NFR BLD AUTO: 0 %
EOSINOPHIL NFR BLD AUTO: 1 %
EOSINOPHIL NFR BLD AUTO: 2 %
ERYTHROCYTE [DISTWIDTH] IN BLOOD BY AUTOMATED COUNT: 15.9 % (ref 10–15)
ERYTHROCYTE [DISTWIDTH] IN BLOOD BY AUTOMATED COUNT: 16.2 % (ref 10–15)
ERYTHROCYTE [DISTWIDTH] IN BLOOD BY AUTOMATED COUNT: 16.3 % (ref 10–15)
ERYTHROCYTE [DISTWIDTH] IN BLOOD BY AUTOMATED COUNT: 16.3 % (ref 10–15)
ERYTHROCYTE [DISTWIDTH] IN BLOOD BY AUTOMATED COUNT: 16.7 % (ref 10–15)
FLUAV RNA SPEC QL NAA+PROBE: NEGATIVE
FLUBV RNA RESP QL NAA+PROBE: NEGATIVE
GFR SERPL CREATININE-BSD FRML MDRD: 90 ML/MIN/1.73M2
GFR SERPL CREATININE-BSD FRML MDRD: >90 ML/MIN/1.73M2
GLUCOSE SERPL-MCNC: 107 MG/DL (ref 70–99)
GLUCOSE SERPL-MCNC: 157 MG/DL (ref 70–99)
GLUCOSE SERPL-MCNC: 85 MG/DL (ref 70–99)
GLUCOSE SERPL-MCNC: 95 MG/DL (ref 70–99)
GLUCOSE UR STRIP-MCNC: NEGATIVE MG/DL
HCO3 BLDV-SCNC: 27 MMOL/L (ref 21–28)
HCT VFR BLD AUTO: 24.5 % (ref 35–47)
HCT VFR BLD AUTO: 25.5 % (ref 35–47)
HCT VFR BLD AUTO: 29.7 % (ref 35–47)
HCT VFR BLD AUTO: 31 % (ref 35–47)
HCT VFR BLD AUTO: 32.9 % (ref 35–47)
HGB BLD-MCNC: 11 G/DL (ref 11.7–15.7)
HGB BLD-MCNC: 7 G/DL (ref 11.7–15.7)
HGB BLD-MCNC: 7.3 G/DL (ref 11.7–15.7)
HGB BLD-MCNC: 7.4 G/DL (ref 11.7–15.7)
HGB BLD-MCNC: 8.6 G/DL (ref 11.7–15.7)
HGB BLD-MCNC: 9.3 G/DL (ref 11.7–15.7)
HGB BLD-MCNC: 9.5 G/DL (ref 11.7–15.7)
HGB BLD-MCNC: 9.7 G/DL (ref 11.7–15.7)
HGB BLD-MCNC: 9.8 G/DL (ref 11.7–15.7)
HGB UR QL STRIP: NEGATIVE
HYALINE CASTS: 4 /LPF
IMM GRANULOCYTES # BLD: 0 10E3/UL
IMM GRANULOCYTES # BLD: 0 10E3/UL
IMM GRANULOCYTES # BLD: 0.1 10E3/UL
IMM GRANULOCYTES NFR BLD: 0 %
IMM GRANULOCYTES NFR BLD: 0 %
IMM GRANULOCYTES NFR BLD: 1 %
INR PPP: 1.08 (ref 0.85–1.15)
INTERPRETATION ECG - MUSE: NORMAL
INTERPRETATION ECG - MUSE: NORMAL
IRON BINDING CAPACITY (ROCHE): 276 UG/DL (ref 240–430)
IRON SATN MFR SERPL: 18 % (ref 15–46)
IRON SERPL-MCNC: 50 UG/DL (ref 37–145)
ISSUE DATE AND TIME: NORMAL
KETONES UR STRIP-MCNC: ABNORMAL MG/DL
KETONES UR STRIP-MCNC: NEGATIVE MG/DL
KETONES UR STRIP-MCNC: NEGATIVE MG/DL
LACTATE BLD-SCNC: 0.8 MMOL/L
LEUKOCYTE ESTERASE UR QL STRIP: ABNORMAL
LEUKOCYTE ESTERASE UR QL STRIP: NEGATIVE
LEUKOCYTE ESTERASE UR QL STRIP: NEGATIVE
LYMPHOCYTES # BLD AUTO: 0.6 10E3/UL (ref 0.8–5.3)
LYMPHOCYTES # BLD AUTO: 1 10E3/UL (ref 0.8–5.3)
LYMPHOCYTES # BLD AUTO: 1.2 10E3/UL (ref 0.8–5.3)
LYMPHOCYTES NFR BLD AUTO: 12 %
LYMPHOCYTES NFR BLD AUTO: 14 %
LYMPHOCYTES NFR BLD AUTO: 4 %
MAGNESIUM SERPL-MCNC: 1.6 MG/DL (ref 1.7–2.3)
MAGNESIUM SERPL-MCNC: 1.6 MG/DL (ref 1.7–2.3)
MAGNESIUM SERPL-MCNC: 1.7 MG/DL (ref 1.7–2.3)
MAGNESIUM SERPL-MCNC: 1.8 MG/DL (ref 1.7–2.3)
MAGNESIUM SERPL-MCNC: 1.8 MG/DL (ref 1.7–2.3)
MAGNESIUM SERPL-MCNC: 1.9 MG/DL (ref 1.7–2.3)
MAGNESIUM SERPL-MCNC: 2.1 MG/DL (ref 1.7–2.3)
MCH RBC QN AUTO: 25.1 PG (ref 26.5–33)
MCH RBC QN AUTO: 25.2 PG (ref 26.5–33)
MCH RBC QN AUTO: 25.3 PG (ref 26.5–33)
MCH RBC QN AUTO: 25.5 PG (ref 26.5–33)
MCH RBC QN AUTO: 25.7 PG (ref 26.5–33)
MCHC RBC AUTO-ENTMCNC: 28.6 G/DL (ref 31.5–36.5)
MCHC RBC AUTO-ENTMCNC: 29 G/DL (ref 31.5–36.5)
MCHC RBC AUTO-ENTMCNC: 29 G/DL (ref 31.5–36.5)
MCHC RBC AUTO-ENTMCNC: 29.5 G/DL (ref 31.5–36.5)
MCHC RBC AUTO-ENTMCNC: 30 G/DL (ref 31.5–36.5)
MCV RBC AUTO: 86 FL (ref 78–100)
MCV RBC AUTO: 86 FL (ref 78–100)
MCV RBC AUTO: 87 FL (ref 78–100)
MCV RBC AUTO: 88 FL (ref 78–100)
MCV RBC AUTO: 88 FL (ref 78–100)
MONOCYTES # BLD AUTO: 0.8 10E3/UL (ref 0–1.3)
MONOCYTES # BLD AUTO: 1.2 10E3/UL (ref 0–1.3)
MONOCYTES # BLD AUTO: 1.4 10E3/UL (ref 0–1.3)
MONOCYTES NFR BLD AUTO: 10 %
MONOCYTES NFR BLD AUTO: 16 %
MONOCYTES NFR BLD AUTO: 7 %
MUCOUS THREADS #/AREA URNS LPF: PRESENT /LPF
MUCOUS THREADS #/AREA URNS LPF: PRESENT /LPF
NEUTROPHILS # BLD AUTO: 13.9 10E3/UL (ref 1.6–8.3)
NEUTROPHILS # BLD AUTO: 6 10E3/UL (ref 1.6–8.3)
NEUTROPHILS # BLD AUTO: 6.2 10E3/UL (ref 1.6–8.3)
NEUTROPHILS NFR BLD AUTO: 70 %
NEUTROPHILS NFR BLD AUTO: 73 %
NEUTROPHILS NFR BLD AUTO: 88 %
NITRATE UR QL: NEGATIVE
NRBC # BLD AUTO: 0 10E3/UL
NRBC BLD AUTO-RTO: 0 /100
P AXIS - MUSE: 92 DEGREES
P AXIS - MUSE: NORMAL DEGREES
PCO2 BLDV: 39 MM HG (ref 40–50)
PH BLDV: 7.45 [PH] (ref 7.32–7.43)
PH UR STRIP: 5 [PH] (ref 5–7)
PH UR STRIP: 5 [PH] (ref 5–7)
PH UR STRIP: 5.5 [PH] (ref 5–7)
PLATELET # BLD AUTO: 331 10E3/UL (ref 150–450)
PLATELET # BLD AUTO: 340 10E3/UL (ref 150–450)
PLATELET # BLD AUTO: 346 10E3/UL (ref 150–450)
PLATELET # BLD AUTO: 384 10E3/UL (ref 150–450)
PLATELET # BLD AUTO: 406 10E3/UL (ref 150–450)
PLATELET # BLD AUTO: 424 10E3/UL (ref 150–450)
PO2 BLDV: 49 MM HG (ref 25–47)
POTASSIUM SERPL-SCNC: 3.4 MMOL/L (ref 3.4–5.3)
POTASSIUM SERPL-SCNC: 3.5 MMOL/L (ref 3.4–5.3)
POTASSIUM SERPL-SCNC: 3.5 MMOL/L (ref 3.4–5.3)
POTASSIUM SERPL-SCNC: 3.7 MMOL/L (ref 3.4–5.3)
POTASSIUM SERPL-SCNC: 3.8 MMOL/L (ref 3.4–5.3)
POTASSIUM SERPL-SCNC: 3.9 MMOL/L (ref 3.4–5.3)
POTASSIUM SERPL-SCNC: 4.1 MMOL/L (ref 3.4–5.3)
POTASSIUM SERPL-SCNC: 4.2 MMOL/L (ref 3.4–5.3)
POTASSIUM SERPL-SCNC: 4.3 MMOL/L (ref 3.4–5.3)
POTASSIUM SERPL-SCNC: 4.5 MMOL/L (ref 3.4–5.3)
PR INTERVAL - MUSE: 166 MS
PR INTERVAL - MUSE: 166 MS
PROT SERPL-MCNC: 7 G/DL (ref 6.4–8.3)
QRS DURATION - MUSE: 72 MS
QRS DURATION - MUSE: 74 MS
QT - MUSE: 354 MS
QT - MUSE: 358 MS
QTC - MUSE: 418 MS
QTC - MUSE: 428 MS
R AXIS - MUSE: 45 DEGREES
R AXIS - MUSE: 8 DEGREES
RBC # BLD AUTO: 2.78 10E6/UL (ref 3.8–5.2)
RBC # BLD AUTO: 2.9 10E6/UL (ref 3.8–5.2)
RBC # BLD AUTO: 3.43 10E6/UL (ref 3.8–5.2)
RBC # BLD AUTO: 3.62 10E6/UL (ref 3.8–5.2)
RBC # BLD AUTO: 3.83 10E6/UL (ref 3.8–5.2)
RBC URINE: 1 /HPF
RBC URINE: 5 /HPF
RBC URINE: 8 /HPF
RSV RNA SPEC NAA+PROBE: NEGATIVE
SAO2 % BLDV: 86 % (ref 94–100)
SARS-COV-2 RNA RESP QL NAA+PROBE: NEGATIVE
SARS-COV-2 RNA RESP QL NAA+PROBE: NEGATIVE
SODIUM SERPL-SCNC: 136 MMOL/L (ref 136–145)
SODIUM SERPL-SCNC: 138 MMOL/L (ref 136–145)
SODIUM SERPL-SCNC: 140 MMOL/L (ref 136–145)
SODIUM SERPL-SCNC: 141 MMOL/L (ref 136–145)
SP GR UR STRIP: 1.03 (ref 1–1.03)
SPECIMEN EXPIRATION DATE: NORMAL
SQUAMOUS EPITHELIAL: 1 /HPF
SQUAMOUS EPITHELIAL: 3 /HPF
SQUAMOUS EPITHELIAL: <1 /HPF
SYSTOLIC BLOOD PRESSURE - MUSE: NORMAL MMHG
SYSTOLIC BLOOD PRESSURE - MUSE: NORMAL MMHG
T AXIS - MUSE: 18 DEGREES
T AXIS - MUSE: 69 DEGREES
TRANSITIONAL EPI: 1 /HPF
TROPONIN T SERPL HS-MCNC: 15 NG/L
TROPONIN T SERPL HS-MCNC: 16 NG/L
TSH SERPL DL<=0.005 MIU/L-ACNC: 1.33 UIU/ML (ref 0.3–4.2)
UNIT ABO/RH: NORMAL
UNIT NUMBER: NORMAL
UNIT STATUS: NORMAL
UNIT TYPE ISBT: 5100
UROBILINOGEN UR STRIP-MCNC: NORMAL MG/DL
VENTRICULAR RATE- MUSE: 82 BPM
VENTRICULAR RATE- MUSE: 88 BPM
VITAMIN D2 SERPL-MCNC: <5 UG/L
VITAMIN D3 SERPL-MCNC: 14 UG/L
WBC # BLD AUTO: 15.9 10E3/UL (ref 4–11)
WBC # BLD AUTO: 6.7 10E3/UL (ref 4–11)
WBC # BLD AUTO: 8.4 10E3/UL (ref 4–11)
WBC # BLD AUTO: 8.5 10E3/UL (ref 4–11)
WBC # BLD AUTO: 9.9 10E3/UL (ref 4–11)
WBC URINE: 1 /HPF
WBC URINE: 40 /HPF
WBC URINE: 6 /HPF

## 2022-01-01 PROCEDURE — 258N000003 HC RX IP 258 OP 636: Performed by: NURSE ANESTHETIST, CERTIFIED REGISTERED

## 2022-01-01 PROCEDURE — P9016 RBC LEUKOCYTES REDUCED: HCPCS | Performed by: INTERNAL MEDICINE

## 2022-01-01 PROCEDURE — 120N000001 HC R&B MED SURG/OB

## 2022-01-01 PROCEDURE — 99239 HOSP IP/OBS DSCHRG MGMT >30: CPT | Performed by: STUDENT IN AN ORGANIZED HEALTH CARE EDUCATION/TRAINING PROGRAM

## 2022-01-01 PROCEDURE — 99217 PR OBSERVATION CARE DISCHARGE: CPT | Performed by: PHYSICIAN ASSISTANT

## 2022-01-01 PROCEDURE — 36415 COLL VENOUS BLD VENIPUNCTURE: CPT | Performed by: EMERGENCY MEDICINE

## 2022-01-01 PROCEDURE — 99285 EMERGENCY DEPT VISIT HI MDM: CPT | Mod: 25

## 2022-01-01 PROCEDURE — C1769 GUIDE WIRE: HCPCS | Performed by: ORTHOPAEDIC SURGERY

## 2022-01-01 PROCEDURE — G0378 HOSPITAL OBSERVATION PER HR: HCPCS

## 2022-01-01 PROCEDURE — 82310 ASSAY OF CALCIUM: CPT | Performed by: INTERNAL MEDICINE

## 2022-01-01 PROCEDURE — 80048 BASIC METABOLIC PNL TOTAL CA: CPT | Performed by: STUDENT IN AN ORGANIZED HEALTH CARE EDUCATION/TRAINING PROGRAM

## 2022-01-01 PROCEDURE — 84132 ASSAY OF SERUM POTASSIUM: CPT | Performed by: INTERNAL MEDICINE

## 2022-01-01 PROCEDURE — 250N000013 HC RX MED GY IP 250 OP 250 PS 637: Performed by: INTERNAL MEDICINE

## 2022-01-01 PROCEDURE — 80048 BASIC METABOLIC PNL TOTAL CA: CPT | Performed by: EMERGENCY MEDICINE

## 2022-01-01 PROCEDURE — 250N000009 HC RX 250: Performed by: ANESTHESIOLOGY

## 2022-01-01 PROCEDURE — 85018 HEMOGLOBIN: CPT | Performed by: INTERNAL MEDICINE

## 2022-01-01 PROCEDURE — 87086 URINE CULTURE/COLONY COUNT: CPT | Performed by: PHYSICIAN ASSISTANT

## 2022-01-01 PROCEDURE — 83605 ASSAY OF LACTIC ACID: CPT

## 2022-01-01 PROCEDURE — 97161 PT EVAL LOW COMPLEX 20 MIN: CPT | Mod: GP | Performed by: PHYSICAL THERAPIST

## 2022-01-01 PROCEDURE — 36415 COLL VENOUS BLD VENIPUNCTURE: CPT | Performed by: INTERNAL MEDICINE

## 2022-01-01 PROCEDURE — C9803 HOPD COVID-19 SPEC COLLECT: HCPCS

## 2022-01-01 PROCEDURE — C1713 ANCHOR/SCREW BN/BN,TIS/BN: HCPCS | Performed by: ORTHOPAEDIC SURGERY

## 2022-01-01 PROCEDURE — 93005 ELECTROCARDIOGRAM TRACING: CPT

## 2022-01-01 PROCEDURE — 0QS636Z REPOSITION RIGHT UPPER FEMUR WITH INTRAMEDULLARY INTERNAL FIXATION DEVICE, PERCUTANEOUS APPROACH: ICD-10-PCS | Performed by: ORTHOPAEDIC SURGERY

## 2022-01-01 PROCEDURE — 80053 COMPREHEN METABOLIC PANEL: CPT | Performed by: EMERGENCY MEDICINE

## 2022-01-01 PROCEDURE — 36415 COLL VENOUS BLD VENIPUNCTURE: CPT

## 2022-01-01 PROCEDURE — 36415 COLL VENOUS BLD VENIPUNCTURE: CPT | Mod: ORL | Performed by: NURSE PRACTITIONER

## 2022-01-01 PROCEDURE — 86901 BLOOD TYPING SEROLOGIC RH(D): CPT | Performed by: PHYSICIAN ASSISTANT

## 2022-01-01 PROCEDURE — 81001 URINALYSIS AUTO W/SCOPE: CPT | Performed by: EMERGENCY MEDICINE

## 2022-01-01 PROCEDURE — 93005 ELECTROCARDIOGRAM TRACING: CPT | Mod: 76

## 2022-01-01 PROCEDURE — 999N000179 XR SURGERY CARM FLUORO LESS THAN 5 MIN W STILLS: Mod: TC

## 2022-01-01 PROCEDURE — 86850 RBC ANTIBODY SCREEN: CPT | Performed by: PHYSICIAN ASSISTANT

## 2022-01-01 PROCEDURE — 85027 COMPLETE CBC AUTOMATED: CPT | Performed by: STUDENT IN AN ORGANIZED HEALTH CARE EDUCATION/TRAINING PROGRAM

## 2022-01-01 PROCEDURE — 999N000141 HC STATISTIC PRE-PROCEDURE NURSING ASSESSMENT: Performed by: ORTHOPAEDIC SURGERY

## 2022-01-01 PROCEDURE — 82565 ASSAY OF CREATININE: CPT | Performed by: INTERNAL MEDICINE

## 2022-01-01 PROCEDURE — U0003 INFECTIOUS AGENT DETECTION BY NUCLEIC ACID (DNA OR RNA); SEVERE ACUTE RESPIRATORY SYNDROME CORONAVIRUS 2 (SARS-COV-2) (CORONAVIRUS DISEASE [COVID-19]), AMPLIFIED PROBE TECHNIQUE, MAKING USE OF HIGH THROUGHPUT TECHNOLOGIES AS DESCRIBED BY CMS-2020-01-R: HCPCS | Performed by: EMERGENCY MEDICINE

## 2022-01-01 PROCEDURE — 85018 HEMOGLOBIN: CPT | Mod: ORL | Performed by: NURSE PRACTITIONER

## 2022-01-01 PROCEDURE — 84443 ASSAY THYROID STIM HORMONE: CPT | Performed by: EMERGENCY MEDICINE

## 2022-01-01 PROCEDURE — 83735 ASSAY OF MAGNESIUM: CPT | Performed by: INTERNAL MEDICINE

## 2022-01-01 PROCEDURE — 82306 VITAMIN D 25 HYDROXY: CPT | Performed by: PHYSICIAN ASSISTANT

## 2022-01-01 PROCEDURE — 82565 ASSAY OF CREATININE: CPT

## 2022-01-01 PROCEDURE — 710N000009 HC RECOVERY PHASE 1, LEVEL 1, PER MIN: Performed by: ORTHOPAEDIC SURGERY

## 2022-01-01 PROCEDURE — 87637 SARSCOV2&INF A&B&RSV AMP PRB: CPT | Performed by: EMERGENCY MEDICINE

## 2022-01-01 PROCEDURE — 81001 URINALYSIS AUTO W/SCOPE: CPT | Performed by: PHYSICIAN ASSISTANT

## 2022-01-01 PROCEDURE — 250N000009 HC RX 250: Performed by: NURSE ANESTHETIST, CERTIFIED REGISTERED

## 2022-01-01 PROCEDURE — 250N000011 HC RX IP 250 OP 636: Performed by: PHYSICIAN ASSISTANT

## 2022-01-01 PROCEDURE — P9604 ONE-WAY ALLOW PRORATED TRIP: HCPCS | Mod: ORL | Performed by: NURSE PRACTITIONER

## 2022-01-01 PROCEDURE — 82140 ASSAY OF AMMONIA: CPT | Performed by: EMERGENCY MEDICINE

## 2022-01-01 PROCEDURE — 250N000011 HC RX IP 250 OP 636

## 2022-01-01 PROCEDURE — 250N000013 HC RX MED GY IP 250 OP 250 PS 637

## 2022-01-01 PROCEDURE — 258N000003 HC RX IP 258 OP 636

## 2022-01-01 PROCEDURE — G1010 CDSM STANSON: HCPCS

## 2022-01-01 PROCEDURE — 85025 COMPLETE CBC W/AUTO DIFF WBC: CPT | Performed by: EMERGENCY MEDICINE

## 2022-01-01 PROCEDURE — 84484 ASSAY OF TROPONIN QUANT: CPT | Performed by: EMERGENCY MEDICINE

## 2022-01-01 PROCEDURE — 99231 SBSQ HOSP IP/OBS SF/LOW 25: CPT | Performed by: INTERNAL MEDICINE

## 2022-01-01 PROCEDURE — 99221 1ST HOSP IP/OBS SF/LOW 40: CPT | Mod: AI | Performed by: INTERNAL MEDICINE

## 2022-01-01 PROCEDURE — 258N000003 HC RX IP 258 OP 636: Performed by: ANESTHESIOLOGY

## 2022-01-01 PROCEDURE — 81001 URINALYSIS AUTO W/SCOPE: CPT | Mod: ORL | Performed by: NURSE PRACTITIONER

## 2022-01-01 PROCEDURE — 250N000009 HC RX 250: Performed by: ORTHOPAEDIC SURGERY

## 2022-01-01 PROCEDURE — 36415 COLL VENOUS BLD VENIPUNCTURE: CPT | Performed by: STUDENT IN AN ORGANIZED HEALTH CARE EDUCATION/TRAINING PROGRAM

## 2022-01-01 PROCEDURE — 70450 CT HEAD/BRAIN W/O DYE: CPT | Mod: MG

## 2022-01-01 PROCEDURE — 36415 COLL VENOUS BLD VENIPUNCTURE: CPT | Performed by: PHYSICIAN ASSISTANT

## 2022-01-01 PROCEDURE — 71046 X-RAY EXAM CHEST 2 VIEWS: CPT

## 2022-01-01 PROCEDURE — 85025 COMPLETE CBC W/AUTO DIFF WBC: CPT | Performed by: INTERNAL MEDICINE

## 2022-01-01 PROCEDURE — 97530 THERAPEUTIC ACTIVITIES: CPT | Mod: GP | Performed by: PHYSICAL THERAPIST

## 2022-01-01 PROCEDURE — 370N000017 HC ANESTHESIA TECHNICAL FEE, PER MIN: Performed by: ORTHOPAEDIC SURGERY

## 2022-01-01 PROCEDURE — 85049 AUTOMATED PLATELET COUNT: CPT

## 2022-01-01 PROCEDURE — 85610 PROTHROMBIN TIME: CPT | Performed by: EMERGENCY MEDICINE

## 2022-01-01 PROCEDURE — 73130 X-RAY EXAM OF HAND: CPT | Mod: LT

## 2022-01-01 PROCEDURE — 272N000001 HC OR GENERAL SUPPLY STERILE: Performed by: ORTHOPAEDIC SURGERY

## 2022-01-01 PROCEDURE — 99225 PR SUBSEQUENT OBSERVATION CARE,LEVEL II: CPT | Performed by: PHYSICIAN ASSISTANT

## 2022-01-01 PROCEDURE — 85027 COMPLETE CBC AUTOMATED: CPT | Performed by: INTERNAL MEDICINE

## 2022-01-01 PROCEDURE — 73502 X-RAY EXAM HIP UNI 2-3 VIEWS: CPT

## 2022-01-01 PROCEDURE — 83735 ASSAY OF MAGNESIUM: CPT | Performed by: EMERGENCY MEDICINE

## 2022-01-01 PROCEDURE — 86923 COMPATIBILITY TEST ELECTRIC: CPT | Performed by: INTERNAL MEDICINE

## 2022-01-01 PROCEDURE — P9603 ONE-WAY ALLOW PRORATED MILES: HCPCS | Mod: ORL | Performed by: NURSE PRACTITIONER

## 2022-01-01 PROCEDURE — 250N000011 HC RX IP 250 OP 636: Performed by: NURSE ANESTHETIST, CERTIFIED REGISTERED

## 2022-01-01 PROCEDURE — 83550 IRON BINDING TEST: CPT | Performed by: PHYSICIAN ASSISTANT

## 2022-01-01 PROCEDURE — 360N000084 HC SURGERY LEVEL 4 W/ FLUORO, PER MIN: Performed by: ORTHOPAEDIC SURGERY

## 2022-01-01 PROCEDURE — 87086 URINE CULTURE/COLONY COUNT: CPT | Mod: ORL,59 | Performed by: NURSE PRACTITIONER

## 2022-01-01 PROCEDURE — 99220 PR INITIAL OBSERVATION CARE,LEVEL III: CPT | Performed by: STUDENT IN AN ORGANIZED HEALTH CARE EDUCATION/TRAINING PROGRAM

## 2022-01-01 PROCEDURE — 99233 SBSQ HOSP IP/OBS HIGH 50: CPT | Performed by: INTERNAL MEDICINE

## 2022-01-01 DEVICE — IMPLANTABLE DEVICE: Type: IMPLANTABLE DEVICE | Site: HIP | Status: FUNCTIONAL

## 2022-01-01 DEVICE — IMP SCR BONE STRK G3 LAG 10.5X95MM TI 3060-0095S: Type: IMPLANTABLE DEVICE | Site: HIP | Status: FUNCTIONAL

## 2022-01-01 DEVICE — IMP SCR STRK LOCK 5.0X42.5MM FT 1896-5042S: Type: IMPLANTABLE DEVICE | Site: HIP | Status: FUNCTIONAL

## 2022-01-01 RX ORDER — ONDANSETRON 2 MG/ML
4 INJECTION INTRAMUSCULAR; INTRAVENOUS EVERY 6 HOURS PRN
Status: DISCONTINUED | OUTPATIENT
Start: 2022-01-01 | End: 2022-01-01 | Stop reason: HOSPADM

## 2022-01-01 RX ORDER — RISPERIDONE 1 MG/ML
0.25 SOLUTION ORAL AT BEDTIME
COMMUNITY
End: 2023-01-01

## 2022-01-01 RX ORDER — ACETAMINOPHEN 325 MG/1
975 TABLET ORAL EVERY 8 HOURS
Status: DISPENSED | OUTPATIENT
Start: 2022-01-01 | End: 2022-01-01

## 2022-01-01 RX ORDER — FENTANYL CITRATE 50 UG/ML
INJECTION, SOLUTION INTRAMUSCULAR; INTRAVENOUS PRN
Status: DISCONTINUED | OUTPATIENT
Start: 2022-01-01 | End: 2022-01-01

## 2022-01-01 RX ORDER — ACETAMINOPHEN 650 MG/1
650 SUPPOSITORY RECTAL EVERY 6 HOURS PRN
Status: DISCONTINUED | OUTPATIENT
Start: 2022-01-01 | End: 2022-01-01 | Stop reason: HOSPADM

## 2022-01-01 RX ORDER — PROPOFOL 10 MG/ML
INJECTION, EMULSION INTRAVENOUS PRN
Status: DISCONTINUED | OUTPATIENT
Start: 2022-01-01 | End: 2022-01-01

## 2022-01-01 RX ORDER — ONDANSETRON 4 MG/1
4 TABLET, ORALLY DISINTEGRATING ORAL EVERY 6 HOURS PRN
Status: DISCONTINUED | OUTPATIENT
Start: 2022-01-01 | End: 2022-01-01 | Stop reason: HOSPADM

## 2022-01-01 RX ORDER — NYSTATIN 100000 U/G
1 CREAM TOPICAL 2 TIMES DAILY
COMMUNITY
Start: 2022-01-01 | End: 2022-01-01

## 2022-01-01 RX ORDER — AMOXICILLIN 250 MG
2 CAPSULE ORAL 2 TIMES DAILY PRN
Status: DISCONTINUED | OUTPATIENT
Start: 2022-01-01 | End: 2022-01-01 | Stop reason: HOSPADM

## 2022-01-01 RX ORDER — CIPROFLOXACIN 250 MG/1
250 TABLET, FILM COATED ORAL 2 TIMES DAILY
Status: ON HOLD | COMMUNITY
Start: 2022-01-01 | End: 2022-01-01

## 2022-01-01 RX ORDER — AMOXICILLIN 250 MG
1 CAPSULE ORAL 2 TIMES DAILY PRN
Qty: 30 TABLET | Refills: 0 | COMMUNITY
Start: 2022-01-01 | End: 2023-01-01

## 2022-01-01 RX ORDER — TROLAMINE SALICYLATE 10 G/100G
CREAM TOPICAL
Qty: 85 G | Status: SHIPPED | OUTPATIENT
Start: 2022-01-01

## 2022-01-01 RX ORDER — NEOSTIGMINE METHYLSULFATE 1 MG/ML
VIAL (ML) INJECTION PRN
Status: DISCONTINUED | OUTPATIENT
Start: 2022-01-01 | End: 2022-01-01

## 2022-01-01 RX ORDER — FENTANYL CITRATE 50 UG/ML
25 INJECTION, SOLUTION INTRAMUSCULAR; INTRAVENOUS EVERY 5 MIN PRN
Status: DISCONTINUED | OUTPATIENT
Start: 2022-01-01 | End: 2022-01-01 | Stop reason: HOSPADM

## 2022-01-01 RX ORDER — BUPIVACAINE HYDROCHLORIDE AND EPINEPHRINE 5; 5 MG/ML; UG/ML
INJECTION, SOLUTION EPIDURAL; INTRACAUDAL; PERINEURAL PRN
Status: DISCONTINUED | OUTPATIENT
Start: 2022-01-01 | End: 2022-01-01 | Stop reason: HOSPADM

## 2022-01-01 RX ORDER — METOPROLOL TARTRATE 1 MG/ML
1-2 INJECTION, SOLUTION INTRAVENOUS EVERY 5 MIN PRN
Status: DISCONTINUED | OUTPATIENT
Start: 2022-01-01 | End: 2022-01-01 | Stop reason: HOSPADM

## 2022-01-01 RX ORDER — OLANZAPINE 5 MG/1
5 TABLET, ORALLY DISINTEGRATING ORAL EVERY 8 HOURS PRN
Status: DISCONTINUED | OUTPATIENT
Start: 2022-01-01 | End: 2022-01-01 | Stop reason: HOSPADM

## 2022-01-01 RX ORDER — TRAMADOL HYDROCHLORIDE 50 MG/1
25 TABLET ORAL EVERY 6 HOURS PRN
COMMUNITY
Start: 2022-01-01 | End: 2022-01-01

## 2022-01-01 RX ORDER — LIDOCAINE 40 MG/G
CREAM TOPICAL
Status: DISCONTINUED | OUTPATIENT
Start: 2022-01-01 | End: 2022-01-01 | Stop reason: HOSPADM

## 2022-01-01 RX ORDER — SODIUM CHLORIDE, SODIUM LACTATE, POTASSIUM CHLORIDE, CALCIUM CHLORIDE 600; 310; 30; 20 MG/100ML; MG/100ML; MG/100ML; MG/100ML
INJECTION, SOLUTION INTRAVENOUS CONTINUOUS
Status: DISCONTINUED | OUTPATIENT
Start: 2022-01-01 | End: 2022-01-01 | Stop reason: HOSPADM

## 2022-01-01 RX ORDER — RISPERIDONE 1 MG/ML
0.5 SOLUTION ORAL 2 TIMES DAILY PRN
Status: DISCONTINUED | OUTPATIENT
Start: 2022-01-01 | End: 2022-01-01

## 2022-01-01 RX ORDER — POTASSIUM CHLORIDE 20MEQ/15ML
20 LIQUID (ML) ORAL ONCE
Status: COMPLETED | OUTPATIENT
Start: 2022-01-01 | End: 2022-01-01

## 2022-01-01 RX ORDER — ACETAMINOPHEN 325 MG/1
975 TABLET ORAL ONCE
Status: DISCONTINUED | OUTPATIENT
Start: 2022-01-01 | End: 2022-01-01 | Stop reason: HOSPADM

## 2022-01-01 RX ORDER — KETAMINE HYDROCHLORIDE 10 MG/ML
INJECTION INTRAMUSCULAR; INTRAVENOUS PRN
Status: DISCONTINUED | OUTPATIENT
Start: 2022-01-01 | End: 2022-01-01

## 2022-01-01 RX ORDER — ONDANSETRON 2 MG/ML
4 INJECTION INTRAMUSCULAR; INTRAVENOUS EVERY 6 HOURS PRN
Status: DISCONTINUED | OUTPATIENT
Start: 2022-01-01 | End: 2022-01-01

## 2022-01-01 RX ORDER — AMOXICILLIN 250 MG
1 CAPSULE ORAL 2 TIMES DAILY PRN
Status: DISCONTINUED | OUTPATIENT
Start: 2022-01-01 | End: 2022-01-01 | Stop reason: HOSPADM

## 2022-01-01 RX ORDER — CEFAZOLIN SODIUM 1 G/3ML
1 INJECTION, POWDER, FOR SOLUTION INTRAMUSCULAR; INTRAVENOUS EVERY 8 HOURS
Status: COMPLETED | OUTPATIENT
Start: 2022-01-01 | End: 2022-01-01

## 2022-01-01 RX ORDER — AMOXICILLIN 250 MG
1 CAPSULE ORAL 2 TIMES DAILY
Status: DISCONTINUED | OUTPATIENT
Start: 2022-01-01 | End: 2022-01-01 | Stop reason: HOSPADM

## 2022-01-01 RX ORDER — NALOXONE HYDROCHLORIDE 0.4 MG/ML
0.4 INJECTION, SOLUTION INTRAMUSCULAR; INTRAVENOUS; SUBCUTANEOUS
Status: DISCONTINUED | OUTPATIENT
Start: 2022-01-01 | End: 2022-01-01 | Stop reason: HOSPADM

## 2022-01-01 RX ORDER — ENOXAPARIN SODIUM 100 MG/ML
40 INJECTION SUBCUTANEOUS EVERY 24 HOURS
Status: DISCONTINUED | OUTPATIENT
Start: 2022-01-01 | End: 2022-01-01 | Stop reason: HOSPADM

## 2022-01-01 RX ORDER — NALOXONE HYDROCHLORIDE 0.4 MG/ML
0.2 INJECTION, SOLUTION INTRAMUSCULAR; INTRAVENOUS; SUBCUTANEOUS
Status: DISCONTINUED | OUTPATIENT
Start: 2022-01-01 | End: 2022-01-01 | Stop reason: HOSPADM

## 2022-01-01 RX ORDER — ONDANSETRON 4 MG/1
4 TABLET, ORALLY DISINTEGRATING ORAL EVERY 6 HOURS PRN
Status: DISCONTINUED | OUTPATIENT
Start: 2022-01-01 | End: 2022-01-01

## 2022-01-01 RX ORDER — TROLAMINE SALICYLATE 10 G/100G
1 CREAM TOPICAL 2 TIMES DAILY
COMMUNITY
End: 2022-01-01

## 2022-01-01 RX ORDER — ONDANSETRON 2 MG/ML
4 INJECTION INTRAMUSCULAR; INTRAVENOUS EVERY 30 MIN PRN
Status: DISCONTINUED | OUTPATIENT
Start: 2022-01-01 | End: 2022-01-01 | Stop reason: HOSPADM

## 2022-01-01 RX ORDER — TRAMADOL HYDROCHLORIDE 50 MG/1
25 TABLET ORAL EVERY 6 HOURS PRN
Qty: 62 TABLET | Refills: 1 | Status: SHIPPED | OUTPATIENT
Start: 2022-01-01 | End: 2023-01-01

## 2022-01-01 RX ORDER — RISPERIDONE 1 MG/ML
0.25 SOLUTION ORAL 2 TIMES DAILY
Status: DISCONTINUED | OUTPATIENT
Start: 2022-01-01 | End: 2022-01-01 | Stop reason: HOSPADM

## 2022-01-01 RX ORDER — LIDOCAINE HYDROCHLORIDE 40 MG/ML
INJECTION, SOLUTION RETROBULBAR PRN
Status: DISCONTINUED | OUTPATIENT
Start: 2022-01-01 | End: 2022-01-01

## 2022-01-01 RX ORDER — LIDOCAINE 40 MG/G
CREAM TOPICAL
Status: DISCONTINUED | OUTPATIENT
Start: 2022-01-01 | End: 2022-01-01

## 2022-01-01 RX ORDER — ONDANSETRON 4 MG/1
4 TABLET, ORALLY DISINTEGRATING ORAL EVERY 30 MIN PRN
Status: DISCONTINUED | OUTPATIENT
Start: 2022-01-01 | End: 2022-01-01 | Stop reason: HOSPADM

## 2022-01-01 RX ORDER — RISPERIDONE 1 MG/ML
0.25 SOLUTION ORAL 2 TIMES DAILY PRN
Status: DISCONTINUED | OUTPATIENT
Start: 2022-01-01 | End: 2022-01-01 | Stop reason: HOSPADM

## 2022-01-01 RX ORDER — CLINDAMYCIN PHOSPHATE 900 MG/50ML
900 INJECTION, SOLUTION INTRAVENOUS SEE ADMIN INSTRUCTIONS
Status: DISCONTINUED | OUTPATIENT
Start: 2022-01-01 | End: 2022-01-01 | Stop reason: HOSPADM

## 2022-01-01 RX ORDER — RISPERIDONE 1 MG/ML
SOLUTION ORAL
Qty: 30 ML | Refills: 11 | Status: SHIPPED | OUTPATIENT
Start: 2022-01-01 | End: 2022-01-01

## 2022-01-01 RX ORDER — OXYCODONE HYDROCHLORIDE 5 MG/1
5 TABLET ORAL EVERY 4 HOURS PRN
Status: DISCONTINUED | OUTPATIENT
Start: 2022-01-01 | End: 2022-01-01 | Stop reason: HOSPADM

## 2022-01-01 RX ORDER — CLINDAMYCIN PHOSPHATE 900 MG/50ML
900 INJECTION, SOLUTION INTRAVENOUS
Status: COMPLETED | OUTPATIENT
Start: 2022-01-01 | End: 2022-01-01

## 2022-01-01 RX ORDER — MEPERIDINE HYDROCHLORIDE 25 MG/ML
12.5 INJECTION INTRAMUSCULAR; INTRAVENOUS; SUBCUTANEOUS
Status: DISCONTINUED | OUTPATIENT
Start: 2022-01-01 | End: 2022-01-01 | Stop reason: HOSPADM

## 2022-01-01 RX ORDER — HYDROMORPHONE HCL IN WATER/PF 6 MG/30 ML
0.2 PATIENT CONTROLLED ANALGESIA SYRINGE INTRAVENOUS
Status: DISCONTINUED | OUTPATIENT
Start: 2022-01-01 | End: 2022-01-01 | Stop reason: HOSPADM

## 2022-01-01 RX ORDER — MORPHINE SULFATE 4 MG/ML
4 INJECTION, SOLUTION INTRAMUSCULAR; INTRAVENOUS ONCE
Status: DISCONTINUED | OUTPATIENT
Start: 2022-01-01 | End: 2022-01-01

## 2022-01-01 RX ORDER — PROCHLORPERAZINE MALEATE 5 MG
5 TABLET ORAL EVERY 6 HOURS PRN
Status: DISCONTINUED | OUTPATIENT
Start: 2022-01-01 | End: 2022-01-01 | Stop reason: HOSPADM

## 2022-01-01 RX ORDER — ACETAMINOPHEN 325 MG/1
650 TABLET ORAL EVERY 6 HOURS PRN
Status: DISCONTINUED | OUTPATIENT
Start: 2022-01-01 | End: 2022-01-01 | Stop reason: HOSPADM

## 2022-01-01 RX ORDER — HYDROMORPHONE HCL IN WATER/PF 6 MG/30 ML
0.4 PATIENT CONTROLLED ANALGESIA SYRINGE INTRAVENOUS
Status: DISCONTINUED | OUTPATIENT
Start: 2022-01-01 | End: 2022-01-01 | Stop reason: HOSPADM

## 2022-01-01 RX ORDER — TRAMADOL HYDROCHLORIDE 50 MG/1
TABLET ORAL
Qty: 120 TABLET | Refills: 0 | Status: SHIPPED | OUTPATIENT
Start: 2022-01-01 | End: 2022-01-01 | Stop reason: DRUGHIGH

## 2022-01-01 RX ORDER — HYDROMORPHONE HCL IN WATER/PF 6 MG/30 ML
0.2 PATIENT CONTROLLED ANALGESIA SYRINGE INTRAVENOUS
Status: DISCONTINUED | OUTPATIENT
Start: 2022-01-01 | End: 2022-01-01

## 2022-01-01 RX ORDER — ONDANSETRON 2 MG/ML
INJECTION INTRAMUSCULAR; INTRAVENOUS PRN
Status: DISCONTINUED | OUTPATIENT
Start: 2022-01-01 | End: 2022-01-01

## 2022-01-01 RX ORDER — OLANZAPINE 10 MG/2ML
10 INJECTION, POWDER, FOR SOLUTION INTRAMUSCULAR ONCE
Status: DISCONTINUED | OUTPATIENT
Start: 2022-01-01 | End: 2022-01-01 | Stop reason: HOSPADM

## 2022-01-01 RX ORDER — TRANEXAMIC ACID 650 MG/1
1950 TABLET ORAL ONCE
Status: CANCELLED | OUTPATIENT
Start: 2022-01-01 | End: 2022-01-01

## 2022-01-01 RX ORDER — FENTANYL CITRATE 50 UG/ML
25 INJECTION, SOLUTION INTRAMUSCULAR; INTRAVENOUS
Status: DISCONTINUED | OUTPATIENT
Start: 2022-01-01 | End: 2022-01-01 | Stop reason: HOSPADM

## 2022-01-01 RX ORDER — ACETAMINOPHEN 325 MG/1
650 TABLET ORAL EVERY 4 HOURS PRN
Status: DISCONTINUED | OUTPATIENT
Start: 2022-01-01 | End: 2022-01-01

## 2022-01-01 RX ORDER — POLYETHYLENE GLYCOL 3350 17 G/17G
17 POWDER, FOR SOLUTION ORAL DAILY
Status: DISCONTINUED | OUTPATIENT
Start: 2022-01-01 | End: 2022-01-01 | Stop reason: HOSPADM

## 2022-01-01 RX ORDER — GLYCOPYRROLATE 0.2 MG/ML
INJECTION, SOLUTION INTRAMUSCULAR; INTRAVENOUS PRN
Status: DISCONTINUED | OUTPATIENT
Start: 2022-01-01 | End: 2022-01-01

## 2022-01-01 RX ADMIN — SODIUM CHLORIDE, POTASSIUM CHLORIDE, SODIUM LACTATE AND CALCIUM CHLORIDE: 600; 310; 30; 20 INJECTION, SOLUTION INTRAVENOUS at 06:42

## 2022-01-01 RX ADMIN — RISPERIDONE 0.25 MG: 1 SOLUTION ORAL at 12:18

## 2022-01-01 RX ADMIN — NEOSTIGMINE METHYLSULFATE 2 MG: 1 INJECTION, SOLUTION INTRAVENOUS at 15:30

## 2022-01-01 RX ADMIN — FENTANYL CITRATE 50 MCG: 50 INJECTION, SOLUTION INTRAMUSCULAR; INTRAVENOUS at 14:44

## 2022-01-01 RX ADMIN — PHENYLEPHRINE HYDROCHLORIDE 100 MCG: 10 INJECTION INTRAVENOUS at 14:51

## 2022-01-01 RX ADMIN — PHENYLEPHRINE HYDROCHLORIDE 100 MCG: 10 INJECTION INTRAVENOUS at 14:48

## 2022-01-01 RX ADMIN — RISPERIDONE 0.25 MG: 1 SOLUTION ORAL at 20:38

## 2022-01-01 RX ADMIN — ENOXAPARIN SODIUM 40 MG: 40 INJECTION SUBCUTANEOUS at 14:27

## 2022-01-01 RX ADMIN — MIDAZOLAM 1 MG: 1 INJECTION INTRAMUSCULAR; INTRAVENOUS at 14:14

## 2022-01-01 RX ADMIN — ACETAMINOPHEN 975 MG: 325 TABLET, FILM COATED ORAL at 01:31

## 2022-01-01 RX ADMIN — RISPERIDONE 0.25 MG: 1 SOLUTION ORAL at 21:24

## 2022-01-01 RX ADMIN — RISPERIDONE 0.25 MG: 1 SOLUTION ORAL at 13:27

## 2022-01-01 RX ADMIN — RISPERIDONE 0.25 MG: 1 SOLUTION ORAL at 21:09

## 2022-01-01 RX ADMIN — PROPOFOL 50 MG: 10 INJECTION, EMULSION INTRAVENOUS at 14:41

## 2022-01-01 RX ADMIN — CLINDAMYCIN PHOSPHATE 900 MG: 900 INJECTION, SOLUTION INTRAVENOUS at 14:10

## 2022-01-01 RX ADMIN — RISPERIDONE 0.25 MG: 1 SOLUTION ORAL at 08:37

## 2022-01-01 RX ADMIN — POLYETHYLENE GLYCOL 3350 17 G: 17 POWDER, FOR SOLUTION ORAL at 08:37

## 2022-01-01 RX ADMIN — ROCURONIUM BROMIDE 15 MG: 50 INJECTION, SOLUTION INTRAVENOUS at 14:52

## 2022-01-01 RX ADMIN — SODIUM CHLORIDE, POTASSIUM CHLORIDE, SODIUM LACTATE AND CALCIUM CHLORIDE: 600; 310; 30; 20 INJECTION, SOLUTION INTRAVENOUS at 19:37

## 2022-01-01 RX ADMIN — ACETAMINOPHEN 975 MG: 325 TABLET, FILM COATED ORAL at 21:18

## 2022-01-01 RX ADMIN — ACETAMINOPHEN 650 MG: 325 TABLET, FILM COATED ORAL at 12:23

## 2022-01-01 RX ADMIN — ACETAMINOPHEN 650 MG: 325 TABLET, FILM COATED ORAL at 20:39

## 2022-01-01 RX ADMIN — LIDOCAINE HYDROCHLORIDE 5 ML: 40 INJECTION, SOLUTION RETROBULBAR; TOPICAL at 13:55

## 2022-01-01 RX ADMIN — SODIUM CHLORIDE, POTASSIUM CHLORIDE, SODIUM LACTATE AND CALCIUM CHLORIDE: 600; 310; 30; 20 INJECTION, SOLUTION INTRAVENOUS at 09:09

## 2022-01-01 RX ADMIN — SENNOSIDES AND DOCUSATE SODIUM 1 TABLET: 50; 8.6 TABLET ORAL at 20:48

## 2022-01-01 RX ADMIN — CEFAZOLIN 1 G: 1 INJECTION, POWDER, FOR SOLUTION INTRAMUSCULAR; INTRAVENOUS at 22:27

## 2022-01-01 RX ADMIN — SODIUM CHLORIDE, POTASSIUM CHLORIDE, SODIUM LACTATE AND CALCIUM CHLORIDE: 600; 310; 30; 20 INJECTION, SOLUTION INTRAVENOUS at 14:10

## 2022-01-01 RX ADMIN — ACETAMINOPHEN 975 MG: 325 TABLET, FILM COATED ORAL at 11:29

## 2022-01-01 RX ADMIN — ACETAMINOPHEN 975 MG: 325 TABLET, FILM COATED ORAL at 00:54

## 2022-01-01 RX ADMIN — POTASSIUM CHLORIDE 20 MEQ: 20 SOLUTION ORAL at 11:09

## 2022-01-01 RX ADMIN — SODIUM CHLORIDE, POTASSIUM CHLORIDE, SODIUM LACTATE AND CALCIUM CHLORIDE: 600; 310; 30; 20 INJECTION, SOLUTION INTRAVENOUS at 16:30

## 2022-01-01 RX ADMIN — PHENYLEPHRINE HYDROCHLORIDE 100 MCG: 10 INJECTION INTRAVENOUS at 14:57

## 2022-01-01 RX ADMIN — CEFAZOLIN 1 G: 1 INJECTION, POWDER, FOR SOLUTION INTRAMUSCULAR; INTRAVENOUS at 06:39

## 2022-01-01 RX ADMIN — PHENYLEPHRINE HYDROCHLORIDE 100 MCG: 10 INJECTION INTRAVENOUS at 15:33

## 2022-01-01 RX ADMIN — FENTANYL CITRATE 50 MCG: 50 INJECTION, SOLUTION INTRAMUSCULAR; INTRAVENOUS at 14:41

## 2022-01-01 RX ADMIN — ENOXAPARIN SODIUM 40 MG: 40 INJECTION SUBCUTANEOUS at 11:09

## 2022-01-01 RX ADMIN — ENOXAPARIN SODIUM 40 MG: 40 INJECTION SUBCUTANEOUS at 11:27

## 2022-01-01 RX ADMIN — RISPERIDONE 0.25 MG: 1 SOLUTION ORAL at 09:10

## 2022-01-01 RX ADMIN — RISPERIDONE 0.25 MG: 1 SOLUTION ORAL at 11:28

## 2022-01-01 RX ADMIN — HYDROMORPHONE HYDROCHLORIDE 0.2 MG: 0.2 INJECTION, SOLUTION INTRAMUSCULAR; INTRAVENOUS; SUBCUTANEOUS at 22:51

## 2022-01-01 RX ADMIN — GLYCOPYRROLATE 0.2 MG: 0.2 INJECTION, SOLUTION INTRAMUSCULAR; INTRAVENOUS at 15:30

## 2022-01-01 RX ADMIN — ACETAMINOPHEN 650 MG: 325 TABLET, FILM COATED ORAL at 06:05

## 2022-01-01 RX ADMIN — ACETAMINOPHEN 975 MG: 325 TABLET, FILM COATED ORAL at 16:21

## 2022-01-01 RX ADMIN — TOPICAL ANESTHETIC 1 SPRAY: 200 SPRAY DENTAL; PERIODONTAL at 14:15

## 2022-01-01 RX ADMIN — PHENYLEPHRINE HYDROCHLORIDE 100 MCG: 10 INJECTION INTRAVENOUS at 14:54

## 2022-01-01 RX ADMIN — Medication 10 MG: at 14:14

## 2022-01-01 RX ADMIN — POLYETHYLENE GLYCOL 3350 17 G: 17 POWDER, FOR SOLUTION ORAL at 11:33

## 2022-01-01 RX ADMIN — ACETAMINOPHEN 975 MG: 325 TABLET, FILM COATED ORAL at 03:48

## 2022-01-01 RX ADMIN — ONDANSETRON HYDROCHLORIDE 4 MG: 2 INJECTION, SOLUTION INTRAVENOUS at 15:35

## 2022-01-01 RX ADMIN — HYDROMORPHONE HYDROCHLORIDE 0.2 MG: 0.2 INJECTION, SOLUTION INTRAMUSCULAR; INTRAVENOUS; SUBCUTANEOUS at 17:32

## 2022-01-01 RX ADMIN — SENNOSIDES AND DOCUSATE SODIUM 1 TABLET: 50; 8.6 TABLET ORAL at 08:37

## 2022-01-01 RX ADMIN — ENOXAPARIN SODIUM 40 MG: 40 INJECTION SUBCUTANEOUS at 10:33

## 2022-01-01 RX ADMIN — Medication 25 MG: at 14:41

## 2022-01-01 RX ADMIN — TRANEXAMIC ACID 1 G: 1 INJECTION, SOLUTION INTRAVENOUS at 14:23

## 2022-01-01 ASSESSMENT — ACTIVITIES OF DAILY LIVING (ADL)
ADLS_ACUITY_SCORE: 42
ADLS_ACUITY_SCORE: 47
ADLS_ACUITY_SCORE: 52
DRESS: 1-->ASSISTANCE (EQUIPMENT/PERSON) NEEDED
ADLS_ACUITY_SCORE: 47
DRESS: 0-->ASSISTANCE NEEDED (DEVELOPMENTALLY APPROPRIATE)
ADLS_ACUITY_SCORE: 46
ADLS_ACUITY_SCORE: 43
ADLS_ACUITY_SCORE: 46
EATING: 1-->ASSISTANCE (EQUIPMENT/PERSON) NEEDED
ADLS_ACUITY_SCORE: 43
ADLS_ACUITY_SCORE: 52
ADLS_ACUITY_SCORE: 47
ADLS_ACUITY_SCORE: 43
ADLS_ACUITY_SCORE: 66
ADLS_ACUITY_SCORE: 35
ADLS_ACUITY_SCORE: 43
CONCENTRATING,_REMEMBERING_OR_MAKING_DECISIONS_DIFFICULTY: YES
ADLS_ACUITY_SCORE: 53
ADLS_ACUITY_SCORE: 45
ADLS_ACUITY_SCORE: 51
ADLS_ACUITY_SCORE: 57
ADLS_ACUITY_SCORE: 43
DIFFICULTY_EATING/SWALLOWING: YES
NUMBER_OF_TIMES_PATIENT_HAS_FALLEN_WITHIN_LAST_SIX_MONTHS: 3
ADLS_ACUITY_SCORE: 43
ADLS_ACUITY_SCORE: 57
WEAR_GLASSES_OR_BLIND: YES
ADLS_ACUITY_SCORE: 43
ADLS_ACUITY_SCORE: 57
ADLS_ACUITY_SCORE: 57
TOILETING_ASSISTANCE: TOILETING DIFFICULTY, ASSISTANCE 1 PERSON
WALKING_OR_CLIMBING_STAIRS_DIFFICULTY: OTHER (SEE COMMENTS)
ADLS_ACUITY_SCORE: 43
ADLS_ACUITY_SCORE: 43
ADLS_ACUITY_SCORE: 57
ADLS_ACUITY_SCORE: 45
ADLS_ACUITY_SCORE: 43
DRESSING/BATHING: BATHING DIFFICULTY, ASSISTANCE 1 PERSON
ADLS_ACUITY_SCORE: 57
EATING/SWALLOWING: OTHER (SEE COMMENTS)
ADLS_ACUITY_SCORE: 45
DEPENDENT_IADLS:: CLEANING;COOKING;LAUNDRY;SHOPPING;MEAL PREPARATION;MEDICATION MANAGEMENT;MONEY MANAGEMENT;TRANSPORTATION
ADLS_ACUITY_SCORE: 47
ADLS_ACUITY_SCORE: 35
ADLS_ACUITY_SCORE: 47
TOILETING_ISSUES: YES
ADLS_ACUITY_SCORE: 52
ADLS_ACUITY_SCORE: 43
ADLS_ACUITY_SCORE: 47
ADLS_ACUITY_SCORE: 46
BATHING: 1-->ASSISTANCE NEEDED
ADLS_ACUITY_SCORE: 47
CHANGE_IN_FUNCTIONAL_STATUS_SINCE_ONSET_OF_CURRENT_ILLNESS/INJURY: YES
ADLS_ACUITY_SCORE: 42
ADLS_ACUITY_SCORE: 46
DRESSING/BATHING_DIFFICULTY: YES
ADLS_ACUITY_SCORE: 57
ADLS_ACUITY_SCORE: 47
ADLS_ACUITY_SCORE: 43
EQUIPMENT_CURRENTLY_USED_AT_HOME: CANE, QUAD
ADLS_ACUITY_SCORE: 47
ADLS_ACUITY_SCORE: 43
ADLS_ACUITY_SCORE: 47
DOING_ERRANDS_INDEPENDENTLY_DIFFICULTY: YES
ADLS_ACUITY_SCORE: 47
TOILETING: 1-->ASSISTANCE (EQUIPMENT/PERSON) NEEDED
ADLS_ACUITY_SCORE: 42
ADLS_ACUITY_SCORE: 43
ADLS_ACUITY_SCORE: 57
ADLS_ACUITY_SCORE: 42
ADLS_ACUITY_SCORE: 43
ADLS_ACUITY_SCORE: 49
ADLS_ACUITY_SCORE: 42
SWALLOWING: 0-->SWALLOWS FOODS/LIQUIDS WITHOUT DIFFICULTY
ADLS_ACUITY_SCORE: 46
ADLS_ACUITY_SCORE: 43
ADLS_ACUITY_SCORE: 46
ADLS_ACUITY_SCORE: 47
ADLS_ACUITY_SCORE: 47
ADLS_ACUITY_SCORE: 52
ADLS_ACUITY_SCORE: 47
ADLS_ACUITY_SCORE: 52
ADLS_ACUITY_SCORE: 46
ADLS_ACUITY_SCORE: 57
ADLS_ACUITY_SCORE: 47
FALL_HISTORY_WITHIN_LAST_SIX_MONTHS: YES
ADLS_ACUITY_SCORE: 35
ADLS_ACUITY_SCORE: 45
ADLS_ACUITY_SCORE: 47
ADLS_ACUITY_SCORE: 43
ADLS_ACUITY_SCORE: 57
ADLS_ACUITY_SCORE: 52
ADLS_ACUITY_SCORE: 57
ADLS_ACUITY_SCORE: 44
ADLS_ACUITY_SCORE: 43
ADLS_ACUITY_SCORE: 57
ADLS_ACUITY_SCORE: 47
ADLS_ACUITY_SCORE: 42
ADLS_ACUITY_SCORE: 37
SWALLOWING: 0-->SWALLOWS FOODS/LIQUIDS WITHOUT DIFFICULTY (DEVELOPMENTALLY APPROPRIATE)
ADLS_ACUITY_SCORE: 35
ADLS_ACUITY_SCORE: 47
TOILETING: 0-->NOT TOILET TRAINED OR ASSISTANCE NEEDED (DEVELOPMENTALLY APPROPRIATE)

## 2022-01-01 ASSESSMENT — ENCOUNTER SYMPTOMS
ACTIVITY CHANGE: 1
ARTHRALGIAS: 1
CONFUSION: 1
AGITATION: 1
WEAKNESS: 1

## 2022-03-03 ENCOUNTER — PATIENT OUTREACH (OUTPATIENT)
Dept: CARE COORDINATION | Facility: CLINIC | Age: 87
End: 2022-03-03
Payer: MEDICARE

## 2022-03-04 NOTE — PROGRESS NOTES
Clinic Care Coordination Contact  Care Team Conversations    Writer called and spoke with Ondina Wilkes who shares they have not heard from regarding [facility] she also shares that they have made report as well. The niece hasn't heard from the county either.    Ondina shares that ximena is in the process of obtaining guardianship for patient and may need help. Mariana asks if she can provide my direct number kati Cardona (Niece) should she have questions with this process and this writer provides this.    Message left for Lizzie( at [facility]) asking for call back.(per voicemail, she is out of the office but vm is being checked)   Per note on 2/23 from [facility]. Katrin states they sent emergency guardianship to  a month prior to discharge and this was not followed up on.    Addendum 3/4/22: Left message for patient's nieceTiffanie, to call back.  (Resources for guardianship)VOA:[phone]  Hillary Walton [phone]    Addendum 3/4/22: Second message left for BILL at [facility]  -Also left message for Ondina Wilkes  -Spoke with [facility] program who shares she did speak with ximena and told her pt would not be accepted into their program due to only accepting Medicare Advantage programs. They also can't take patients on who have assigned provider in [facility].    Addendum 3/4/22: BILL recvd call back from Ondina Wilkes who shares she had a long conversation with [place] today and they will be moving forward with guardianship with alyTiffanie musa. We also discussed in great detail how this happened. Ondina shares that she sent guardianship paperwork to TCU  a month prior to pt's discharge and this was ignored. Ondina feels that patient needed to discharge as a bed was needed in TCU.  Ondina doesn't feel that finding PCP for pt right now is appropriate as she is refusing all appointments.  Ondina is aware that myself and BILL Aguirre will be here for future needs with  guardianship or finding PCP. Ondina expresses appreciation for this and will be in touch as needed.        DOV Dee   Social Work Clinic Care Coordinator   [facility]  PH: [phone]  [email or web address]

## 2022-03-21 ENCOUNTER — PATIENT OUTREACH (OUTPATIENT)
Dept: CARE COORDINATION | Facility: CLINIC | Age: 87
End: 2022-03-21
Payer: MEDICARE

## 2022-03-21 NOTE — PROGRESS NOTES
Clinic Care Coordination Contact  Care Team Conversations    Recvd call from Ondina @ [facility] informing patient's niece has decided not to pursue guardianship for patient and they are now in need of assistance with guardianship process as well as cognitive assessment for pt for which a doctor's referral is needed.    BILL let Ondina know that they would send an email with contact #'s for [facility] which help with guardianship.  Ondina will send writer an email with contact information for imani who she has been working with.    -BILL called [facility] and was told that pursuing guardianship may not help much and advised that pt get an assessment to assess cognitive level and abilities. They wouldn't get involved until after this is done.    -This writer shared this information with Ondina via email.      Addendum 3/23/22: Recvd email back from Ondina informing she reached out to their Nurse Manager to see if they can get on-site assessment done.

## 2022-03-30 ENCOUNTER — ASSISTED LIVING VISIT (OUTPATIENT)
Dept: GERIATRICS | Facility: CLINIC | Age: 87
End: 2022-03-30
Payer: MEDICARE

## 2022-03-30 VITALS
HEART RATE: 64 BPM | TEMPERATURE: 97.2 F | SYSTOLIC BLOOD PRESSURE: 164 MMHG | OXYGEN SATURATION: 96 % | BODY MASS INDEX: 18.65 KG/M2 | DIASTOLIC BLOOD PRESSURE: 85 MMHG | WEIGHT: 95 LBS | HEIGHT: 60 IN | RESPIRATION RATE: 18 BRPM

## 2022-03-30 DIAGNOSIS — R29.6 RECURRENT FALLS: ICD-10-CM

## 2022-03-30 DIAGNOSIS — F22 PARANOIA (H): ICD-10-CM

## 2022-03-30 DIAGNOSIS — I48.91 ATRIAL FIBRILLATION, UNSPECIFIED TYPE (H): ICD-10-CM

## 2022-03-30 DIAGNOSIS — G30.1 LATE ONSET ALZHEIMER'S DEMENTIA WITH BEHAVIORAL DISTURBANCE (H): Primary | ICD-10-CM

## 2022-03-30 DIAGNOSIS — H90.3 BILATERAL SENSORINEURAL HEARING LOSS: ICD-10-CM

## 2022-03-30 DIAGNOSIS — F02.818 LATE ONSET ALZHEIMER'S DEMENTIA WITH BEHAVIORAL DISTURBANCE (H): Primary | ICD-10-CM

## 2022-03-30 DIAGNOSIS — M40.00 ACQUIRED POSTURAL KYPHOSIS: ICD-10-CM

## 2022-03-30 NOTE — PROGRESS NOTES
"Jewels Cortez is a 86 year old female seen March 30, 2022 at [facility] [place] 2[place] where she has resided for almost 2 years (admit May 2020) seen to evaluate cognitive impairment.   She is seen in her apartment up ambulating about without device.  Takes repeated step-by-step cuing to sit at the table for visit.     \"I took my cast off\" left wrist.   Denies any pain.   Reports she eats okay, meals are brought into her apartment for her.   Doesn't take any medications.   Can't say why she isn't getting medication administration.  Doesn't think she needs any extra help.       Sleeps well except when someone stands \"out in the street and shouts\"  Otherwise likes her apartment.   Ambulates without device or with a cane.   Doesn't know how to change the channels on her TV or what services she receives in AL.   Thinks the lady next door and her daughter are going to take her apartment.     Much of her history is nonsensical:    \"Two people came and got me, took me away from here.  I was scared to death.\"   Can't say when or where   \"Did you know the man who owns this place has a young son that came to visit?  I call him \"little jason\" because he was born to the people who lived next [facility] in [facility].\"      By chart review, pt has significant history of declining cognitive abilities and accompanying paranoia and delusional thinking.   She had an inpatient psychiatric stay in 2019     She has episodes when her confusion is worse.  A year ago she called police on occasion because she did not feel safe.   She has declined AL services and medication administration.     In December 2021 she had a fall in which she suffered a left radial fracture.  Required IM Haldol and lorazepam in the ED to facilitate exam and imaging.   Fracture managed nonoperatively.   She was started on bid quetiapine in the hospital, for restlessness, impulsivity, unsafe behaviors.   She discharged to [facility]'s TCU.    When discharged from TCU she was " "supposed to go to Memory Care unit, but would not sign paperwork and also declines medication administration or other services.  She does have meals brought in and housecleaning done.   Lunch brought in today, but pt not interested, \"I don't like purple and orange food.\"   Also missing many teeth and not able to eat the sandwich.       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     Bilateral sensorineural hearing loss wears hearing aides 5/22/2020     Cancer (H)     skin cancer forehead and earlobe     Cataract 2019    bilateral with lens implants     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) 5/23/2020     Memory loss      Onychomycosis due to dermatophyte 5/23/2020     Right leg swelling 5/22/2020     Past Surgical History:   Procedure Laterality Date     BLADDER LIFT SURGERY       CATARACT IOL, RT/LT Bilateral 2020    surgery done in [place]     CERVICAL NECK SURGERY       HYSTERECTOMY       LUMBAR BACK SURGERY       skin cancer      removal of earlobe and face     SH:  Lives alone, AL.  Moved here from [place].     , no children.   Her nephew lives in [place]    Worked as an    Non smoker    ROS:  [facility] 15/30    Thinks she has a \"foreign TV\"     Date Monday, end of March, doesn't know the year or time of day even after looking at a large clock with the day and date on it in her room.   \"That thing isn't mine.\"     Doesn't known name of town or name of [place], thinks it's [place]   When corrected she says \"[place].\"   Knows her birthday but not how old she is.    States \"I have to take a cab\" if she wants to go somewhere.  Doesn't think there is any transportation service at this AL.    \"Somedays I go for a walk.\"    Wt Readings from Last 5 Encounters:   03/30/22 43.1 kg (95 lb)   12/15/21 44.1 kg (97 lb 4.8 oz)   07/21/20 43.1 kg (95 lb)      EXAM: NAD  BP (!) 164/85   Pulse 64   Temp 97.2  F (36.2  C)   Resp 18   Ht 1.524 " m (5')   Wt 43.1 kg (95 lb)   SpO2 96%   BMI 18.55 kg/m     Very [place]  Missing most of her upper teeth    Stands with head bent all the way forward, +kyphoscoliosis   Right shoulder higher than left.     Lungs with decreased BS, no rales or wheeze  Heart RRR s1s2  Abd soft, NT, no distention or guarding, +BS  Ext without edema  Neuro: disorientation, +significant wordfinding difficulty  Psych: cooperative with visit, occ smiles.       1/21/2022:     SODIUM 135 - 145 mmol/L 141    POTASSIUM 3.5 - 5.0 mmol/L 3.9    CHLORIDE 98 - 110 mmol/L 104    CO2,TOTAL 21 - 31 mmol/L 27    ANION GAP 5 - 18 10    GLUCOSE 65 - 100 mg/dL 107 High     CALCIUM 8.5 - 10.5 mg/dL 9.2    BUN 8 - 25 mg/dL 16    CREATININE 0.57 - 1.11 mg/dL 0.58    BUN/CREAT RATIO           10 - 20 28 High     GFR if African American >60 ml/min/1.73m2 >60    GFR if not African American >60 ml/min/1.73m2 >60      WHITE BLOOD COUNT         4.5 - 11.0 thou/cu mm 9.0    RED BLOOD COUNT           4.00 - 5.20 mil/cu mm 3.84 Low     HEMOGLOBIN                12.0 - 16.0 g/dL 11.2 Low     HEMATOCRIT                33.0 - 51.0 % 36.5    MCV                       80 - 100 fL 95    MCH                       26.0 - 34.0 pg 29.2    MCHC                      32.0 - 36.0 g/dL 30.7 Low     RDW                       11.5 - 15.5 % 14.2    PLATELET COUNT            140 - 440 thou/cu mm 560 High       CT HEAD W/O CONTRAST  12/13/2021  INDICATION: Traumatic injury. Confusion.  INTRACRANIAL CONTENTS: No intracranial hemorrhage, extraaxial collection, or mass effect.  No CT evidence of acute infarct. Mild to moderate presumed chronic small vessel ischemic changes. Mild to moderate generalized volume loss. No hydrocephalus.     EKG 12/13/2021:  Atrial fibrillation with a competing junctional pacemaker   Nonspecific ST and T wave abnormality        No previous ECGs available       IMP/PLAN:   (G30.1,  F02.81) Late onset Alzheimer's dementia with behavioral disturbance (H)   (primary encounter diagnosis)  Comment: significant cognitive deficits that include STML, disorientation, loss of language skills, distractibility, low functional status, loss of insight and judgment.       Plan: Home Care Referral for Speech therapy for cognitive eval and tx, and Social work services.     Pt is not competent to understand or manage her own financial and legal affairs.  She cannot make rational decisions for herself and needs a guardian.     She is also at risk for wandering away, further falls.  Should be in Memory Care unit.       (F22) Paranoia (H)  Comment: evident on interview today    Plan: she is prescribed quetiapine, but not taking and has declined med admin.       (I48.91) Atrial fibrillation, unspecified type (H)  Comment: by EKG    She is not able to articulate symptoms, so not clear if she is symptomatic     Plan: she is not currently treated as she refuses medications     (M40.00) Acquired postural kyphosis  Comment: head bent all the way forward, cannot look straight ahead     Plan: not treated, as above       (H90.3) Bilateral sensorineural hearing loss wears hearing aides  Comment: compounds dementia   Plan: needs assist with HAs       (R29.6) Recurrent falls  Comment: impulsivity, poor safety awareness, generalized weakness     Plan: encourage use of assistive device  Continues to be high risk for further falls.       Amelia Merino MD

## 2022-03-30 NOTE — LETTER
"    3/30/2022        RE: Jewels Cortez  [facility]  [address]  [address]  [facility] [id number]        Jewels Cortez is a 86 year old female seen March 30, 2022 at [facility] where she has resided for almost 2 years (admit May 2020) seen to evaluate cognitive impairment.   She is seen in her apartment up ambulating about without device.  Takes repeated step-by-step cuing to sit at the table for visit.     \"I took my cast off\" left wrist.   Denies any pain.   Reports she eats okay, meals are brought into her apartment for her.   Doesn't take any medications.   Can't say why she isn't getting medication administration.  Doesn't think she needs any extra help.       Sleeps well except when someone stands \"out in the street and shouts\"  Otherwise likes her apartment.   Ambulates without device or with a cane.   Doesn't know how to change the channels on her TV or what services she receives in AL.   Thinks the lady next door and her daughter are going to take her apartment.     Much of her history is nonsensical:    \"Two people came and got me, took me away from here.  I was scared to death.\"   Can't say when or where   \"Did you know the man who owns this place has a young son that came to visit?  I call him \"little jason\" because he was born to the people who lived next [facility] in [facility].\"      By chart review, pt has significant history of declining cognitive abilities and accompanying paranoia and delusional thinking.   She had an inpatient psychiatric stay in 2019     She has episodes when her confusion is worse.  A year ago she called police on occasion because she did not feel safe.   She has declined AL services and medication administration.     In December 2021 she had a fall in which she suffered a left radial fracture.  Required IM Haldol and lorazepam in the ED to facilitate exam and imaging.   Fracture managed nonoperatively.   She was started on bid quetiapine in the hospital, for " "restlessness, impulsivity, unsafe behaviors.   She discharged to [facility]'s TCU.    When discharged from TCU she was supposed to go to Memory Care unit, but would not sign paperwork and also declines medication administration or other services.  She does have meals brought in and housecleaning done.   Lunch brought in today, but pt not interested, \"I don't like purple and orange food.\"   Also missing many teeth and not able to eat the sandwich.       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     Bilateral sensorineural hearing loss wears hearing aides 5/22/2020     Cancer (H)     skin cancer forehead and earlobe     Cataract 2019    bilateral with lens implants     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) 5/23/2020     Memory loss      Onychomycosis due to dermatophyte 5/23/2020     Right leg swelling 5/22/2020     Past Surgical History:   Procedure Laterality Date     BLADDER LIFT SURGERY       CATARACT IOL, RT/LT Bilateral 2020    surgery done in [place]     CERVICAL NECK SURGERY       HYSTERECTOMY       LUMBAR BACK SURGERY       skin cancer      removal of earlobe and face     SH:  Lives alone, AL.  Moved here from [place].     , no children.   Her nephew lives in [place]    Worked as an    Non smoker    ROS:  [facility] 15/30    Thinks she has a \"foreign TV\"     Date Monday, end of March, doesn't know the year or time of day even after looking at a large clock with the day and date on it in her room.   \"That thing isn't mine.\"     Doesn't known name of town or name of AL, thinks it's [place]   When corrected she says \"[place].\"   Knows her birthday but not how old she is.    States \"I have to take a cab\" if she wants to go somewhere.  Doesn't think there is any transportation service at this AL.    \"Somedays I go for a walk.\"    Wt Readings from Last 5 Encounters:   03/30/22 43.1 kg (95 lb)   12/15/21 44.1 kg (97 lb 4.8 " oz)   07/21/20 43.1 kg (95 lb)      EXAM: NAD  BP (!) 164/85   Pulse 64   Temp 97.2  F (36.2  C)   Resp 18   Ht 1.524 m (5')   Wt 43.1 kg (95 lb)   SpO2 96%   BMI 18.55 kg/m     Very [place]  Missing most of her upper teeth    Stands with head bent all the way forward, +kyphoscoliosis   Right shoulder higher than left.     Lungs with decreased BS, no rales or wheeze  Heart RRR s1s2  Abd soft, NT, no distention or guarding, +BS  Ext without edema  Neuro: disorientation, +significant wordfinding difficulty  Psych: cooperative with visit, occ smiles.       1/21/2022:     SODIUM 135 - 145 mmol/L 141    POTASSIUM 3.5 - 5.0 mmol/L 3.9    CHLORIDE 98 - 110 mmol/L 104    CO2,TOTAL 21 - 31 mmol/L 27    ANION GAP 5 - 18 10    GLUCOSE 65 - 100 mg/dL 107 High     CALCIUM 8.5 - 10.5 mg/dL 9.2    BUN 8 - 25 mg/dL 16    CREATININE 0.57 - 1.11 mg/dL 0.58    BUN/CREAT RATIO           10 - 20 28 High     GFR if African American >60 ml/min/1.73m2 >60    GFR if not African American >60 ml/min/1.73m2 >60      WHITE BLOOD COUNT         4.5 - 11.0 thou/cu mm 9.0    RED BLOOD COUNT           4.00 - 5.20 mil/cu mm 3.84 Low     HEMOGLOBIN                12.0 - 16.0 g/dL 11.2 Low     HEMATOCRIT                33.0 - 51.0 % 36.5    MCV                       80 - 100 fL 95    MCH                       26.0 - 34.0 pg 29.2    MCHC                      32.0 - 36.0 g/dL 30.7 Low     RDW                       11.5 - 15.5 % 14.2    PLATELET COUNT            140 - 440 thou/cu mm 560 High       CT HEAD W/O CONTRAST  12/13/2021  INDICATION: Traumatic injury. Confusion.  INTRACRANIAL CONTENTS: No intracranial hemorrhage, extraaxial collection, or mass effect.  No CT evidence of acute infarct. Mild to moderate presumed chronic small vessel ischemic changes. Mild to moderate generalized volume loss. No hydrocephalus.     EKG 12/13/2021:  Atrial fibrillation with a competing junctional pacemaker   Nonspecific ST and T wave abnormality        No previous  ECGs available       IMP/PLAN:   (G30.1,  F02.81) Late onset Alzheimer's dementia with behavioral disturbance (H)  (primary encounter diagnosis)  Comment: significant cognitive deficits that include STML, disorientation, loss of language skills, distractibility, low functional status, loss of insight and judgment.       Plan: Home Care Referral for Speech therapy for cognitive eval and tx, and Social work services.     Pt is not competent to understand or manage her own financial and legal affairs.  She cannot make rational decisions for herself and needs a guardian.     She is also at risk for wandering away, further falls.  Should be in Memory Care unit.       (F22) Paranoia (H)  Comment: evident on interview today    Plan: she is prescribed quetiapine, but not taking and has declined med admin.       (I48.91) Atrial fibrillation, unspecified type (H)  Comment: by EKG    She is not able to articulate symptoms, so not clear if she is symptomatic     Plan: she is not currently treated as she refuses medications     (M40.00) Acquired postural kyphosis  Comment: head bent all the way forward, cannot look straight ahead     Plan: not treated, as above       (H90.3) Bilateral sensorineural hearing loss wears hearing aides  Comment: compounds dementia   Plan: needs assist with HAs       (R29.6) Recurrent falls  Comment: impulsivity, poor safety awareness, generalized weakness     Plan: encourage use of assistive device  Continues to be high risk for further falls.       Amelia Merino MD         Sincerely,        Amelia Merino MD

## 2022-04-08 PROBLEM — F22 PARANOIA (H): Status: ACTIVE | Noted: 2022-04-08

## 2022-06-06 NOTE — LETTER
6/6/2022        RE: [facility]  [address]  [address]  [facility] [address]        [facility]  [place] Medical Record Number:  [id number]  Place of Service where encounter took place:  [facility] [employer]  [place] [facility] [[id number]]  Chief Complaint   Patient presents with     Edema       HPI:    Jewels Cortez  is a 87 year old (1935), who is being seen today for an episodic care visit.  HPI information obtained from: facility chart records, facility staff, patient report and [facility] chart review. Today's concern is: edema, kyphosis, alzheimer's, a-fib.  Has increase in LE edema. Reports some LE discomfort at times. Not currently taking diuretics or wearing comp. Stockings.  Resp. Status stable. Some FOOTE. Has severe kyphosis. No reports of back or chest pain. Has prn tylenol.  HR stable. Has a-fib. Not anticoagulated.  Has h/o falls. S/p hosp. Stay, rehab for L radial fx 12/21. [facility] 15/30. Has alzheimer's with delusions, paranoia at times. Labile mood at times.  Has order for seroquel-refuses all medication. Refuses therapies. Per staff, poa has not yet sign forms to start AL cares.       Past Medical and Surgical History reviewed in Epic today.    MEDICATIONS:    Current Outpatient Medications   Medication Sig Dispense Refill     acetaminophen (ACETAMINOPHEN EXTRA STRENGTH) 500 MG tablet Take 2 tablets (1,000 mg) by mouth 3 times daily       ibuprofen (ADVIL/MOTRIN) 200 MG tablet Take 1-2 tablets (200-400 mg) by mouth every 6 hours as needed for moderate pain       QUEtiapine (SEROQUEL) 25 MG tablet Take 0.5 tablets (12.5 mg) by mouth daily 30 tablet 0     QUEtiapine (SEROQUEL) 25 MG tablet Take 1 tablet (25 mg) by mouth every evening 30 tablet 0         REVIEW OF SYSTEMS:  Unobtainable secondary to cognitive impairment. Denies current pain    Objective:  BP (!) 143/77   Pulse 65   Resp 20   Ht 1.524 m (5')   SpO2 98%   BMI 18.55 kg/m     Exam:  GENERAL APPEARANCE:  Alert, cooperative, sl tearful at times  ENT:  Mouth and posterior oropharynx normal, moist mucous membranes, [place]  EYES:  EOM, conjunctivae, lids, pupils and irises normal, PERRL  NECK:  No adenopathy,masses or thyromegaly, some decreased ROM  RESP:  respiratory effort and palpation of chest normal, lungs clear to auscultation , no respiratory distress, diminished breath sounds bibasilar  CV:  Palpation and auscultation of heart done , regular rate and rhythm, no murmur, rub, or gallop, peripheral edema 3+ in LEs, pitting.   ABDOMEN:  normal bowel sounds, soft, nontender, no hepatosplenomegaly or other masses, no guarding or rebound  M/S:   severe kyphoscoliosis. some difficutly opening door. muscle strength 5/5 all 4 ext  SKIN:  Inspection of skin and subcutaneous tissue baseline  NEURO:   Cranial nerves 2-12 are normal tested and grossly at patient's baseline, speech clear, no tremor  PSYCH:  insight and judgement impaired, memory impaired , affect abnormal -flat. no paranoid statements made during exam. some difficulty with word finding.     Labs:     Most Recent 3 BMP's:Recent Labs   Lab Test 12/13/21  1803      POTASSIUM 3.5   CHLORIDE 110*   CO2 25   BUN 19   CR 0.54   ANIONGAP 6   MALICK 8.6   *       ASSESSMENT/PLAN:  (R60.0) Bilateral leg edema  (primary encounter diagnosis)  Comment: increase in s/s. Some LE discomfort  Plan: 1. Start marshall hose, on in am, off HS  2. Monitor for LE redness, increased pain  3. Follow O2 sats  4. Remind to elevate LEs    (M40.00) Acquired postural kyphosis  Comment: sig. Flexion at waist. No recent falls. Uses walker, cane. Refuses therapies  Plan: 1. Cont. Prn tylenol, ibuprofen for pain  2. Monitor for changes in gait, falls  3. Monitor for further increase in FOOTE    (G30.1,  F02.81) Late onset Alzheimer's dementia with behavioral disturbance (H)  Comment: memory loss. Delusions at times. Refusal of cares, meds. Niece recently  appointed as POA  Plan: 1. Left vm for niece.  Will need to sign AL forms to start nursing care  2. Will discuss need for memory care placement  3. Monitor for s/s UTI    (I48.91) Atrial fibrillation, unspecified type (H)  Comment: HR stable. No recent dizziness  Plan: 1. Follow BPs, HRs  2. Monitor for dizziness  3. Encourage fluids    Electronically signed by:  LAY Wheatley CNP                 Sincerely,        LAY Wheatley CNP     No

## 2022-06-06 NOTE — PROGRESS NOTES
[facility]  [facility] Record Number:  [id number]  Place of Service where encounter took place:  [facility] [[id number]]  Chief Complaint   Patient presents with     Edema       HPI:    Jewels Cortez  is a 87 year old (1935), who is being seen today for an episodic care visit.  HPI information obtained from: facility chart records, facility staff, patient report and [facility] chart review. Today's concern is: edema, kyphosis, alzheimer's, a-fib.  Has increase in LE edema. Reports some LE discomfort at times. Not currently taking diuretics or wearing comp. Stockings.  Resp. Status stable. Some FOOTE. Has severe kyphosis. No reports of back or chest pain. Has prn tylenol.  HR stable. Has a-fib. Not anticoagulated.  Has h/o falls. S/p hosp. Stay, rehab for L radial fx 12/21. [facility] 15/30. Has alzheimer's with delusions, paranoia at times. Labile mood at times.  Has order for seroquel-refuses all medication. Refuses therapies. Per staff, poa has not yet sign forms to start AL cares.       Past Medical and Surgical History reviewed in Epic today.    MEDICATIONS:    Current Outpatient Medications   Medication Sig Dispense Refill     acetaminophen (ACETAMINOPHEN EXTRA STRENGTH) 500 MG tablet Take 2 tablets (1,000 mg) by mouth 3 times daily       ibuprofen (ADVIL/MOTRIN) 200 MG tablet Take 1-2 tablets (200-400 mg) by mouth every 6 hours as needed for moderate pain       QUEtiapine (SEROQUEL) 25 MG tablet Take 0.5 tablets (12.5 mg) by mouth daily 30 tablet 0     QUEtiapine (SEROQUEL) 25 MG tablet Take 1 tablet (25 mg) by mouth every evening 30 tablet 0         REVIEW OF SYSTEMS:  Unobtainable secondary to cognitive impairment. Denies current pain    Objective:  BP (!) 143/77   Pulse 65   Resp 20   Ht 1.524 m (5')   SpO2 98%   BMI 18.55 kg/m    Exam:  GENERAL APPEARANCE:  Alert, cooperative, sl tearful at times  ENT:  Mouth and posterior oropharynx normal, moist  mucous membranes, [place]  EYES:  EOM, conjunctivae, lids, pupils and irises normal, PERRL  NECK:  No adenopathy,masses or thyromegaly, some decreased ROM  RESP:  respiratory effort and palpation of chest normal, lungs clear to auscultation , no respiratory distress, diminished breath sounds bibasilar  CV:  Palpation and auscultation of heart done , regular rate and rhythm, no murmur, rub, or gallop, peripheral edema 3+ in LEs, pitting.   ABDOMEN:  normal bowel sounds, soft, nontender, no hepatosplenomegaly or other masses, no guarding or rebound  M/S:   severe kyphoscoliosis. some difficutly opening door. muscle strength 5/5 all 4 ext  SKIN:  Inspection of skin and subcutaneous tissue baseline  NEURO:   Cranial nerves 2-12 are normal tested and grossly at patient's baseline, speech clear, no tremor  PSYCH:  insight and judgement impaired, memory impaired , affect abnormal -flat. no paranoid statements made during exam. some difficulty with word finding.     Labs:     Most Recent 3 BMP's:Recent Labs   Lab Test 12/13/21  1803      POTASSIUM 3.5   CHLORIDE 110*   CO2 25   BUN 19   CR 0.54   ANIONGAP 6   MALICK 8.6   *       ASSESSMENT/PLAN:  (R60.0) Bilateral leg edema  (primary encounter diagnosis)  Comment: increase in s/s. Some LE discomfort  Plan: 1. Start marshall hose, on in am, off HS  2. Monitor for LE redness, increased pain  3. Follow O2 sats  4. Remind to elevate LEs    (M40.00) Acquired postural kyphosis  Comment: sig. Flexion at waist. No recent falls. Uses walker, cane. Refuses therapies  Plan: 1. Cont. Prn tylenol, ibuprofen for pain  2. Monitor for changes in gait, falls  3. Monitor for further increase in FOOTE    (G30.1,  F02.81) Late onset Alzheimer's dementia with behavioral disturbance (H)  Comment: memory loss. Delusions at times. Refusal of cares, meds. Niece recently appointed as POA  Plan: 1. Left vm for niece.  Will need to sign AL forms to start nursing care  2. Will discuss need for memory  care placement  3. Monitor for s/s UTI    (I48.91) Atrial fibrillation, unspecified type (H)  Comment: HR stable. No recent dizziness  Plan: 1. Follow BPs, HRs  2. Monitor for dizziness  3. Encourage fluids    Electronically signed by:  LAY Wheatley CNP

## 2022-08-15 NOTE — LETTER
8/15/2022        RE: [facility]  [address]  [address]  [facility] [address]        [facility]  [place] Medical Record Number:  [id number]  Place of Service where encounter took place:  [facility]  [facility] ([facility]) [[id number]]  Chief Complaint   Patient presents with     Anxiety       HPI:    Jewels Cortez  is a 87 year old (1935), who is being seen today for an episodic care visit.  HPI information obtained from: facility chart records, facility staff, patient report and [facility] chart review. Today's concern is: anxiety, paranoia, kyphosis. Has dementia. Requires memory care for increased staff assist. Moved to secured memory care unit 8/12/22. Had sig. Increase in anxiety, paranoia. Refused to take seroquel. Started on sched, prn risperdal-liq. Staff have gotten to take some doses. Appears to be less anxious today. Previous 2 days had exit-seeking behaviors-knocking on exit door, making statements of wanting to leave. Ongoing severe kyphosis. Flexed at waist. Gait gen. Stable with cane. Resp. Status has been stable.    Past Medical and Surgical History reviewed in Epic today.    MEDICATIONS:    Current Outpatient Medications   Medication Sig Dispense Refill     risperiDONE (RISPERDAL) 1 MG/ML solution 0.25 mg Bid and bid prn 30 mL 11         REVIEW OF SYSTEMS:  Unobtainable secondary to cognitive impairment. Reports feeling ok today    Objective:  /66   Pulse 74   Resp 18   Ht 1.524 m (5')   SpO2 92%   BMI 18.55 kg/m    Exam:  GENERAL APPEARANCE:  Alert, in no distress, sl anxious at times  ENT:  Mouth and posterior oropharynx normal, moist mucous membranes, [place]  EYES:  EOM, conjunctivae, lids, pupils and irises normal, PERRL  NECK:  neck flexion  RESP:  respiratory effort and palpation of chest normal, lungs clear to auscultation , no respiratory distress, diminished breath sounds bibasilar  CV:  Palpation and auscultation of  heart done , regular rate and rhythm, no murmur, rub, or gallop, peripheral edema trace+ in LEs  ABDOMEN:  normal bowel sounds, soft, nontender, no hepatosplenomegaly or other masses, no guarding or rebound  M/S:   flexed at waist. gait appears steady with can. muscle strength 5/5 all 4 ext.  NEURO:   Cranial nerves 2-12 are normal tested and grossly at patient's baseline, no tremor seen  PSYCH:  insight and judgement impaired, memory impaired , affect and mood normal    Labs:     Most Recent 3 BMP's:Recent Labs   Lab Test 12/13/21  1803      POTASSIUM 3.5   CHLORIDE 110*   CO2 25   BUN 19   CR 0.54   ANIONGAP 6   MALICK 8.6   *       ASSESSMENT/PLAN:  (F41.9) Anxiety  (primary encounter diagnosis)  Comment: recent increase in s/s with move to memory care unit. Appears to have anxiety regarding keeping track of room keys. Perseverates at times  Plan: 1. Cont. Secured memory care unit  2. Monitor for exit-seeking behaviors, knocking on exit door  3. Cont. To invite to activities throughout the day    (F22) Paranoia (H)  Comment: ongoing s/s, increase in past few days with move to memory care. Refusing meds-stating staff is trying to poison her. seroquel dc'd due to refusal. Started on liq. risperdal  Plan: cont. Sched, prn risperdal-did take this am dose   2. Monitor for statements of being poisoned  3. Follow po intake  4. 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 No recent fall. Sig flexion at waist. Resp. Status stable  Plan: 1. Cont. To remind to use cane   2. Monitor for changes in gait, reports of pain  3. Follow resp. status    Electronically signed by:  LAY Wheatley CNP                 Sincerely,        LAY Wheatley CNP

## 2022-08-15 NOTE — PROGRESS NOTES
[facility]  [place] Medical Record Number:  [id number]  Place of Service where encounter took place:  [facility]  [facility] ([facility]) [[id number]]  Chief Complaint   Patient presents with     Anxiety       HPI:    Jewels Cortez  is a 87 year old (1935), who is being seen today for an episodic care visit.  HPI information obtained from: facility chart records, facility staff, patient report and [facility] chart review. Today's concern is: anxiety, paranoia, kyphosis. Has dementia. Requires memory care for increased staff assist. Moved to secured memory care unit 8/12/22. Had sig. Increase in anxiety, paranoia. Refused to take seroquel. Started on sched, prn risperdal-liq. Staff have gotten to take some doses. Appears to be less anxious today. Previous 2 days had exit-seeking behaviors-knocking on exit door, making statements of wanting to leave. Ongoing severe kyphosis. Flexed at waist. Gait gen. Stable with cane. Resp. Status has been stable.    Past Medical and Surgical History reviewed in Epic today.    MEDICATIONS:    Current Outpatient Medications   Medication Sig Dispense Refill     risperiDONE (RISPERDAL) 1 MG/ML solution 0.25 mg Bid and bid prn 30 mL 11         REVIEW OF SYSTEMS:  Unobtainable secondary to cognitive impairment. Reports feeling ok today    Objective:  /66   Pulse 74   Resp 18   Ht 1.524 m (5')   SpO2 92%   BMI 18.55 kg/m    Exam:  GENERAL APPEARANCE:  Alert, in no distress, sl anxious at times  ENT:  Mouth and posterior oropharynx normal, moist mucous membranes, [place]  EYES:  EOM, conjunctivae, lids, pupils and irises normal, PERRL  NECK:  neck flexion  RESP:  respiratory effort and palpation of chest normal, lungs clear to auscultation , no respiratory distress, diminished breath sounds bibasilar  CV:  Palpation and auscultation of heart done , regular rate and rhythm, no murmur, rub, or gallop, peripheral edema trace+ in LEs  ABDOMEN:  normal  bowel sounds, soft, nontender, no hepatosplenomegaly or other masses, no guarding or rebound  M/S:   flexed at waist. gait appears steady with can. muscle strength 5/5 all 4 ext.  NEURO:   Cranial nerves 2-12 are normal tested and grossly at patient's baseline, no tremor seen  PSYCH:  insight and judgement impaired, memory impaired , affect and mood normal    Labs:     Most Recent 3 BMP's:Recent Labs   Lab Test 12/13/21  1803      POTASSIUM 3.5   CHLORIDE 110*   CO2 25   BUN 19   CR 0.54   ANIONGAP 6   MALICK 8.6   *       ASSESSMENT/PLAN:  (F41.9) Anxiety  (primary encounter diagnosis)  Comment: recent increase in s/s with move to memory care unit. Appears to have anxiety regarding keeping track of room keys. Perseverates at times  Plan: 1. Cont. Secured memory care unit  2. Monitor for exit-seeking behaviors, knocking on exit door  3. Cont. To invite to activities throughout the day    (F22) Paranoia (H)  Comment: ongoing s/s, increase in past few days with move to memory care. Refusing meds-stating staff is trying to poison her. seroquel dc'd due to refusal. Started on liq. risperdal  Plan: cont. Sched, prn risperdal-did take this am dose   2. Monitor for statements of being poisoned  3. Follow po intake  4. 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 No recent fall. Sig flexion at waist. Resp. Status stable  Plan: 1. Cont. To remind to use cane   2. Monitor for changes in gait, reports of pain  3. Follow resp. status    Electronically signed by:  LAY Wheatley CNP

## 2022-08-22 NOTE — PROGRESS NOTES
[place] [facility]  [place] Medical Record Number:  [id number]  Place of Service where encounter took place:  [facility]  [facility] ([facility]) [[id number]]  Chief Complaint   Patient presents with     Anxiety       HPI:    Jewels Cortez  is a 87 year old (1935), who is being seen today for an episodic care visit.  HPI information obtained from: facility chart records, facility staff, patient report and [facility] chart review. Today's concern is: anxiety, dementia. [facility]. Has dementia with paranoia, increase in anxiety at times. Resistive to move to new unit, had refused seroquel. Started on liq. risperdal-has been taking.  Per staff, decrease in anxiety, more redirectable. No recent reports of exit-seeking behaviors.       Past Medical and Surgical History reviewed in Epic today.    MEDICATIONS:    Current Outpatient Medications   Medication Sig Dispense Refill     risperiDONE (RISPERDAL) 1 MG/ML solution 0.25 mg Bid and bid prn 30 mL 11         REVIEW OF SYSTEMS:  Unobtainable secondary to cognitive impairment. Reports feeling ok today    Objective:  /69   Pulse 71   Resp 20   Ht 1.524 m (5')   SpO2 93%   BMI 18.55 kg/m    Exam:  GENERAL APPEARANCE:  Alert, in no distress, cooperative  ENT:  Mouth and posterior oropharynx normal, moist mucous membranes, [place]  EYES:  EOM, conjunctivae, lids, pupils and irises normal, PERRL  RESP:  respiratory effort and palpation of chest normal, lungs clear to auscultation , diminished breath sounds bibasilar  CV:  Palpation and auscultation of heart done , regular rate and rhythm, no murmur, rub, or gallop, peripheral edema trace+ in LEs  ABDOMEN:  normal bowel sounds, soft, nontender, no hepatosplenomegaly or other masses, no guarding or rebound  M/S:   severe kyphosis. muscle strength 5/5 all 4 ext.  NEURO:   Cranial nerves 2-12 are normal tested and grossly at patient's baseline  PSYCH:  insight and  judgement impaired, memory impaired , affect and mood normal    Labs:     Most Recent 3 BMP's:Recent Labs   Lab Test 12/13/21  1803      POTASSIUM 3.5   CHLORIDE 110*   CO2 25   BUN 19   CR 0.54   ANIONGAP 6   MALICK 8.6   *       ASSESSMENT/PLAN:  (F41.9) Anxiety  (primary encounter diagnosis)  Comment: improved.  Plan: 1. Cont. Sched, prn risperdal  2. Monitor for perseveration, episodes of increased anxiety  3. Cont. To  Invite to activities throughout the day    (F03.91) Dementia with behavioral disturbance, unspecified dementia type (H)  Comment: no recent paranoia or exit seeking behaviors. Some initial adjustment difficulties to new memory care unit  Plan: 1. risperdal as above  2. Monitor for paranoia, med refusal, decrease in po intake  3. Monitor for changes in gait    Electronically signed by:  LAY Wheatley CNP

## 2022-08-22 NOTE — LETTER
8/22/2022        RE: [facility]  [address]  [address]  [facility] [address]        [place] [facility]  [place] Medical Record Number:  [id number]  Place of Service where encounter took place:  [facility] [employer]  [facility] ([facility]) [[id number]]  Chief Complaint   Patient presents with     Anxiety       HPI:    Jewels Cortez  is a 87 year old (1935), who is being seen today for an episodic care visit.  HPI information obtained from: facility chart records, facility staff, patient report and [facility] chart review. Today's concern is: anxiety, dementia. Recent transfer to secured memory care unit. Has dementia with paranoia, increase in anxiety at times. Resistive to move to new unit, had refused seroquel. Started on liq. risperdal-has been taking.  Per staff, decrease in anxiety, more redirectable. No recent reports of exit-seeking behaviors.       Past Medical and Surgical History reviewed in Epic today.    MEDICATIONS:    Current Outpatient Medications   Medication Sig Dispense Refill     risperiDONE (RISPERDAL) 1 MG/ML solution 0.25 mg Bid and bid prn 30 mL 11         REVIEW OF SYSTEMS:  Unobtainable secondary to cognitive impairment. Reports feeling ok today    Objective:  /69   Pulse 71   Resp 20   Ht 1.524 m (5')   SpO2 93%   BMI 18.55 kg/m    Exam:  GENERAL APPEARANCE:  Alert, in no distress, cooperative  ENT:  Mouth and posterior oropharynx normal, moist mucous membranes, [place]  EYES:  EOM, conjunctivae, lids, pupils and irises normal, PERRL  RESP:  respiratory effort and palpation of chest normal, lungs clear to auscultation , diminished breath sounds bibasilar  CV:  Palpation and auscultation of heart done , regular rate and rhythm, no murmur, rub, or gallop, peripheral edema trace+ in LEs  ABDOMEN:  normal bowel sounds, soft, nontender, no hepatosplenomegaly or other masses, no guarding or rebound  M/S:   severe kyphosis. muscle strength 5/5 all  4 ext.  NEURO:   Cranial nerves 2-12 are normal tested and grossly at patient's baseline  PSYCH:  insight and judgement impaired, memory impaired , affect and mood normal    Labs:     Most Recent 3 BMP's:Recent Labs   Lab Test 12/13/21  1803      POTASSIUM 3.5   CHLORIDE 110*   CO2 25   BUN 19   CR 0.54   ANIONGAP 6   MALICK 8.6   *       ASSESSMENT/PLAN:  (F41.9) Anxiety  (primary encounter diagnosis)  Comment: improved.  Plan: 1. Cont. Sched, prn risperdal  2. Monitor for perseveration, episodes of increased anxiety  3. Cont. To  Invite to activities throughout the day    (F03.91) Dementia with behavioral disturbance, unspecified dementia type (H)  Comment: no recent paranoia or exit seeking behaviors. Some initial adjustment difficulties to new memory care unit  Plan: 1. risperdal as above  2. Monitor for paranoia, med refusal, decrease in po intake  3. Monitor for changes in gait    Electronically signed by:  LAY Wheatley CNP                 Sincerely,        LAY Wheatley CNP

## 2022-08-26 NOTE — TELEPHONE ENCOUNTER
[facility] Triage Nurse Telephone Encounter    Provider: LAY Soria CNP   Facility: [facility]  Facility Type:  AL    Caller: Lesia    Allergies:    Allergies   Allergen Reactions     Penicillins Unknown     Sulfa Drugs Unknown        Reason for call: The back of pt's right hand is swollen and red. Nurse states it looks like she bumped it against something. Pt is able to use her hand and no c/o pain. Encouraged ice and report if condition worsens. PCP notified.    Anne Marie Ward RN

## 2022-10-03 NOTE — LETTER
10/3/2022        RE: [facility]  [address]  [address]  [facility] [address]        [place] [facility]  [place] Medical Record Number:  [id number]  Place of Service where encounter took place:  [facility]  [employer] ([facility]) [[id number]]  Chief Complaint   Patient presents with     Edema       HPI:    Jewels Cortez  is a 87 year old (1935), who is being seen today for an episodic care visit.  HPI information obtained from: facility chart records, facility staff, patient report and [facility] chart review. Today's concern is: edema, alzheimer's, kyphosis. Has had ongoing LE edema. No recent increase in s/s. Resp. Status stable. Currently wears tubigrip stockings. Does not elevate LEs during the day. BP sl elevated today. HR stable. For alzheimer's cont. On risperdal. Mood, behaviors gen. Stable. No recent paranoia. Po intake stable. Amb. Per usual. Has severe kyphosis. Does not use walker. Gait gen. Stable. No recent falls. No recent c/o back pain.       Past Medical and Surgical History reviewed in Epic today.    MEDICATIONS:    Current Outpatient Medications   Medication Sig Dispense Refill     risperiDONE (RISPERDAL) 1 MG/ML solution 0.25 mg Bid and bid prn 30 mL 11         REVIEW OF SYSTEMS:  Limited secondary to cognitive impairment but today pt reports no current back pain    Objective:  BP (!) 142/71   Pulse 66   Resp 18   SpO2 95%   Exam:  GENERAL APPEARANCE:  Alert, in no distress, thin  ENT:  Mouth and posterior oropharynx normal, moist mucous membranes, [place]  EYES:  EOM, conjunctivae, lids, pupils and irises normal, PERRL  NECK:  some decrease in ROM  RESP:  respiratory effort and palpation of chest normal, lungs clear to auscultation , no respiratory distress, diminished breath sounds bibasilar  CV:  Palpation and auscultation of heart done , regular rate and rhythm, no murmur, rub, or gallop, peripheral edema trace-1+ in LEs  ABDOMEN:  normal  bowel sounds, soft, nontender, no hepatosplenomegaly or other masses, no guarding or rebound  M/S:   gait steady. severe kyphosis  NEURO:   Cranial nerves 2-12 are normal tested and grossly at patient's baseline, speech clear  PSYCH:  insight and judgement impaired, memory impaired , affect and mood normal    Labs:     Most Recent 3 BMP's:Recent Labs   Lab Test 12/13/21  1803      POTASSIUM 3.5   CHLORIDE 110*   CO2 25   BUN 19   CR 0.54   ANIONGAP 6   MALICK 8.6   *       ASSESSMENT/PLAN:  (R60.0) Bilateral leg edema  (primary encounter diagnosis)  Comment: ongoing. No recent sig. increase  Plan: 1. Cont. tubigrip stockings  2. Offload, ly in bed throughout the day  3. Follow wt.s  4. Monitor for skin breakdown of LEs    (G30.1,  F02.818) Late onset Alzheimer's dementia with behavioral disturbance (H)  Comment: memory loss. No recent increase in paranoia  Plan: 1. Cont. risperdal  2. Monitor for paranoia  3. 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 No recent falls  Plan: 1. Monitor for back pain, changes in gait  2. For falls, changes in gait, refer to PT  3. Monitor for resp. status    Electronically signed by:  LAY Wheatley CNP                 Sincerely,        LAY Wheatley CNP

## 2022-10-03 NOTE — PROGRESS NOTES
[place] [facility]  [place] Medical Record Number:  [id number]  Place of Service where encounter took place:  [facility]  [facility] ([facility]) [[id number]]  Chief Complaint   Patient presents with     Edema       HPI:    Jewels Cortez  is a 87 year old (1935), who is being seen today for an episodic care visit.  HPI information obtained from: facility chart records, facility staff, patient report and [facility] chart review. Today's concern is: edema, alzheimer's, kyphosis. Has had ongoing LE edema. No recent increase in s/s. Resp. Status stable. Currently wears tubigrip stockings. Does not elevate LEs during the day. BP sl elevated today. HR stable. For alzheimer's cont. On risperdal. Mood, behaviors gen. Stable. No recent paranoia. Po intake stable. Amb. Per usual. Has severe kyphosis. Does not use walker. Gait gen. Stable. No recent falls. No recent c/o back pain.       Past Medical and Surgical History reviewed in Epic today.    MEDICATIONS:    Current Outpatient Medications   Medication Sig Dispense Refill     risperiDONE (RISPERDAL) 1 MG/ML solution 0.25 mg Bid and bid prn 30 mL 11         REVIEW OF SYSTEMS:  Limited secondary to cognitive impairment but today pt reports no current back pain    Objective:  BP (!) 142/71   Pulse 66   Resp 18   SpO2 95%   Exam:  GENERAL APPEARANCE:  Alert, in no distress, thin  ENT:  Mouth and posterior oropharynx normal, moist mucous membranes, [place]  EYES:  EOM, conjunctivae, lids, pupils and irises normal, PERRL  NECK:  some decrease in ROM  RESP:  respiratory effort and palpation of chest normal, lungs clear to auscultation , no respiratory distress, diminished breath sounds bibasilar  CV:  Palpation and auscultation of heart done , regular rate and rhythm, no murmur, rub, or gallop, peripheral edema trace-1+ in LEs  ABDOMEN:  normal bowel sounds, soft, nontender, no hepatosplenomegaly or other masses, no guarding or rebound  M/S:   gait steady.  severe kyphosis  NEURO:   Cranial nerves 2-12 are normal tested and grossly at patient's baseline, speech clear  PSYCH:  insight and judgement impaired, memory impaired , affect and mood normal    Labs:     Most Recent 3 BMP's:Recent Labs   Lab Test 12/13/21  1803      POTASSIUM 3.5   CHLORIDE 110*   CO2 25   BUN 19   CR 0.54   ANIONGAP 6   MALICK 8.6   *       ASSESSMENT/PLAN:  (R60.0) Bilateral leg edema  (primary encounter diagnosis)  Comment: ongoing. No recent sig. increase  Plan: 1. Cont. tubigrip stockings  2. Offload, ly in bed throughout the day  3. Follow wt.s  4. Monitor for skin breakdown of LEs    (G30.1,  F02.818) Late onset Alzheimer's dementia with behavioral disturbance (H)  Comment: memory loss. No recent increase in paranoia  Plan: 1. Cont. risperdal  2. Monitor for paranoia  3. 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 No recent falls  Plan: 1. Monitor for back pain, changes in gait  2. For falls, changes in gait, refer to PT  3. Monitor for resp. status    Electronically signed by:  LAY Wheatley CNP

## 2022-10-04 PROBLEM — F03.90 DEMENTIA WITHOUT BEHAVIORAL DISTURBANCE (H): Status: ACTIVE | Noted: 2021-12-13

## 2022-10-26 NOTE — TELEPHONE ENCOUNTER
[facility] Geriatric[facility] Triage Nurse Telephone Encounter    Provider: LAY Soria CNP   Facility: [facility]  Facility Type:  AL    Caller: Tammy  Call Back Number: [phone]    Allergies:    Allergies   Allergen Reactions     Penicillins Unknown     Sulfa Drugs Unknown        Reason for call: Pt reporting pain in right knee and is unable to ambulate independently. Pt also requiring assistance with feeding for the past 2 days. Pt seen scooping up only air, no food on the utensil.     Verbal Order/Direction given by Provider:   1) 2 view xray to right knee, dx: pain  2) AsperCreme to right knee BID    Provider giving Order:  LAY Soria CNP     Verbal Order given to: Tammy Ward RN

## 2022-10-27 NOTE — LETTER
10/27/2022        RE: [facility]  [address]  [address]  [facility] [address]        [place] [facility]  [place] Medical Record Number:  [id number]  Place of Service where encounter took place:  [facility] [employer]  [facility] ([facility]) [[id number]]  Chief Complaint   Patient presents with     Knee Pain       HPI:    Jewels Cortez  is a 87 year old (1935), who is being seen today for an episodic care visit.  HPI information obtained from: facility chart records, facility staff, patient report and [facility] chart review. Today's concern is: R knee pain, weakness, alzheimer's.  Reported increase in R knee pain. XR done today neg. Started on aspercreme to R knee. Per staff, has been resistive to w.b. on LEs. Using w.c. at times.  Has also had increased confusion, delusions from baseline. Cont. On  risperdal for alzheimer's. covid test today neg.       Past Medical and Surgical History reviewed in Epic today.    MEDICATIONS:    Current Outpatient Medications   Medication Sig Dispense Refill     trolamine salicylate (ASPERCREME) 10 % external cream Apply 1 g topically 2 times daily       risperiDONE (RISPERDAL) 1 MG/ML solution 0.25 mg Bid and bid prn 30 mL 11         REVIEW OF SYSTEMS:  Limited secondary to cognitive impairment but today pt reports R knee pain    Objective:  /67   Pulse 72   Temp 97.9  F (36.6  C)   Resp 18   Ht 1.524 m (5')   SpO2 93%   BMI 18.55 kg/m    Exam:  GENERAL APPEARANCE:  Alert, anxious, cooperative  ENT:  Mouth and posterior oropharynx normal, moist mucous membranes, [place]  EYES:  EOM, conjunctivae, lids, pupils and irises normal, PERRL  NECK:  flexion  RESP:  respiratory effort and palpation of chest normal, lungs clear to auscultation , no respiratory distress  CV:  Palpation and auscultation of heart done , regular rate and rhythm, no murmur, rub, or gallop, peripheral edema trace+ in LEs  ABDOMEN:  normal bowel sounds, soft,  nontender, no hepatosplenomegaly or other masses, no guarding or rebound  M/S:   muscle strength 5/5 UEs, some gen. LE weakness. no apparent pain with ROM R knee. severe kyphosis  NEURO:   Cranial nerves 2-12 are normal tested and grossly at patient's baseline, speech clear  PSYCH:  insight and judgement impaired, memory impaired , affect abnormal -flat. delusions present    Labs:     Most Recent 3 BMP's:Recent Labs   Lab Test 12/13/21  1803      POTASSIUM 3.5   CHLORIDE 110*   CO2 25   BUN 19   CR 0.54   ANIONGAP 6   MALICK 8.6   *       ASSESSMENT/PLAN:  (M25.561) Acute pain of right knee  (primary encounter diagnosis)  Comment: newer onset. No reports of falls. Some deg. Changes of R knee per XR done today  Plan: 1 cont aspercreme to R knee bid  2. Reassess over next few days, if ineffective may try voltaren gel    (R29.898) Weakness of both lower extremities  Comment: increase past couple days, decrease in amb  Plan: 1. Address R knee pain as above  2. Check UA, UC to r/o UTI  3. Refer to PT for ongoing s/s  4. Encourage amb as roseann    (G30.1,  F02.818) Late onset Alzheimer's dementia with behavioral disturbance (H)  Comment: memory loss. Increase in anxiety, delusions  Plan: 1. Cont. Sched, prn risperdal as above  2. UA as above  3. For ongoing s/s over next few days with neg. UA, may increase sched. risperdal dose    Electronically signed by:  LAY Wheatley CNP         Documentation of Face-to-Face and Certification for Home Health Services     Patient: Jewels Cortez   YOB: 1935  MR Number: [id number]  Today's Date: 10/27/2022    I certify that patient: Jewels Cortez is under my care and that I, or a nurse practitioner or physician's assistant working with me, had a face-to-face encounter that meets the physician face-to-face encounter requirements with this patient on: 10/27/2022.    This encounter with the patient was in whole, or in part, for the following medical  condition, which is the primary reason for home health care: R knee pain, decrease in amb.    I certify that, based on my findings, the following services are medically necessary home health services: Physical Therapy.    My clinical findings support the need for the above services because: Physical Therapy Services are needed to assess and treat the following functional impairments: decrease in amb. LE weakness.    Further, I certify that my clinical findings support that this patient is homebound (i.e. absences from home require considerable and taxing effort and are for medical reasons or [employer] services or infrequently or of short duration when for other reasons) because: Requires assistance of another person or specialized equipment to access medical services because patient:  requires current assist with transfer..    Based on the above findings. I certify that this patient is confined to the home and needs intermittent skilled nursing care, physical therapy and/or speech therapy.  The patient is under my care, and I have initiated the establishment of the plan of care.  This patient will be followed by a physician who will periodically review the plan of care.  Physician/Provider to provide follow up care: Holden Block    Responsible Medicare certified Novelty Physician: Amelia Merino  Physician Signature: See electronic signature associated with these discharge orders.  Date: 10/27/2022                Sincerely,        LAY Wheatley CNP

## 2022-10-27 NOTE — PROGRESS NOTES
[place] [facility]  [place] Medical Record Number:  [id number]  Place of Service where encounter took place:  [facility]  [facility] ([facility]) [[id number]]  Chief Complaint   Patient presents with     Knee Pain       HPI:    Jewels Cortez  is a 87 year old (1935), who is being seen today for an episodic care visit.  HPI information obtained from: facility chart records, facility staff, patient report and [facility] chart review. Today's concern is: R knee pain, weakness, alzheimer's.  Reported increase in R knee pain. XR done today neg. Started on aspercreme to R knee. Per staff, has been resistive to w.b. on LEs. Using w.c. at times.  Has also had increased confusion, delusions from baseline. Cont. On  risperdal for alzheimer's. covid test today neg.       Past Medical and Surgical History reviewed in Epic today.    MEDICATIONS:    Current Outpatient Medications   Medication Sig Dispense Refill     trolamine salicylate (ASPERCREME) 10 % external cream Apply 1 g topically 2 times daily       risperiDONE (RISPERDAL) 1 MG/ML solution 0.25 mg Bid and bid prn 30 mL 11         REVIEW OF SYSTEMS:  Limited secondary to cognitive impairment but today pt reports R knee pain    Objective:  /67   Pulse 72   Temp 97.9  F (36.6  C)   Resp 18   Ht 1.524 m (5')   SpO2 93%   BMI 18.55 kg/m    Exam:  GENERAL APPEARANCE:  Alert, anxious, cooperative  ENT:  Mouth and posterior oropharynx normal, moist mucous membranes, [place]  EYES:  EOM, conjunctivae, lids, pupils and irises normal, PERRL  NECK:  flexion  RESP:  respiratory effort and palpation of chest normal, lungs clear to auscultation , no respiratory distress  CV:  Palpation and auscultation of heart done , regular rate and rhythm, no murmur, rub, or gallop, peripheral edema trace+ in LEs  ABDOMEN:  normal bowel sounds, soft, nontender, no hepatosplenomegaly or other masses, no guarding or rebound  M/S:   muscle strength 5/5 UEs, some gen. LE  weakness. no apparent pain with ROM R knee. severe kyphosis  NEURO:   Cranial nerves 2-12 are normal tested and grossly at patient's baseline, speech clear  PSYCH:  insight and judgement impaired, memory impaired , affect abnormal -flat. delusions present    Labs:     Most Recent 3 BMP's:Recent Labs   Lab Test 12/13/21  1803      POTASSIUM 3.5   CHLORIDE 110*   CO2 25   BUN 19   CR 0.54   ANIONGAP 6   MALICK 8.6   *       ASSESSMENT/PLAN:  (M25.561) Acute pain of right knee  (primary encounter diagnosis)  Comment: newer onset. No reports of falls. Some deg. Changes of R knee per XR done today  Plan: 1 cont aspercreme to R knee bid  2. Reassess over next few days, if ineffective may try voltaren gel    (R29.898) Weakness of both lower extremities  Comment: increase past couple days, decrease in amb  Plan: 1. Address R knee pain as above  2. Check UA, UC to r/o UTI  3. Refer to PT for ongoing s/s  4. Encourage amb as roseann    (G30.1,  F02.818) Late onset Alzheimer's dementia with behavioral disturbance (H)  Comment: memory loss. Increase in anxiety, delusions  Plan: 1. Cont. Sched, prn risperdal as above  2. UA as above  3. For ongoing s/s over next few days with neg. UA, may increase sched. risperdal dose    Electronically signed by:  LAY Wheatley CNP         Documentation of Face-to-Face and Certification for Home Health Services     Patient: Jewels Cortez   YOB: 1935  MR Number: [id number]  Today's Date: 10/27/2022    I certify that patient: Jewels Cortez is under my care and that I, or a nurse practitioner or physician's assistant working with me, had a face-to-face encounter that meets the physician face-to-face encounter requirements with this patient on: 10/27/2022.    This encounter with the patient was in whole, or in part, for the following medical condition, which is the primary reason for home health care: R knee pain, decrease in amb.    I certify that, based on  my findings, the following services are medically necessary home health services: Physical Therapy.    My clinical findings support the need for the above services because: Physical Therapy Services are needed to assess and treat the following functional impairments: decrease in amb. LE weakness.    Further, I certify that my clinical findings support that this patient is homebound (i.e. absences from home require considerable and taxing effort and are for medical reasons or [employer] services or infrequently or of short duration when for other reasons) because: Requires assistance of another person or specialized equipment to access medical services because patient:  requires current assist with transfer..    Based on the above findings. I certify that this patient is confined to the home and needs intermittent skilled nursing care, physical therapy and/or speech therapy.  The patient is under my care, and I have initiated the establishment of the plan of care.  This patient will be followed by a physician who will periodically review the plan of care.  Physician/Provider to provide follow up care: Holden Block    Responsible Medicare certified AALIYAH Physician: Amelia Merino  Physician Signature: See electronic signature associated with these discharge orders.  Date: 10/27/2022

## 2022-10-31 NOTE — PROGRESS NOTES
[place] [facility]  [place] Medical Record Number:  [id number]  Place of Service where encounter took place:  [facility]  [facility] ([facility]) [[id number]]  Chief Complaint   Patient presents with     Fatigue       HPI:    Jewels Cortez  is a 87 year old (1935), who is being seen today for an episodic care visit.  HPI information obtained from: facility chart records, facility staff, patient report and [facility] chart review. Today's concern is: weakness, R knee pain, alzheimer's. Has had gne. Weakness, unable to amb. Last week.  Has had R knee pain.  Started on aspercreme. R knee pain improved. Was not amb. Had increased confusion, agitation. Cont. On risperdal for alzheimer's. UA appeared +. UC 50-100k urogenital organisms. Afebrile.       Past Medical and Surgical History reviewed in Epic today.    MEDICATIONS:    Current Outpatient Medications   Medication Sig Dispense Refill     ciprofloxacin (CIPRO) 250 MG tablet Take 250 mg by mouth 2 times daily       risperiDONE (RISPERDAL) 1 MG/ML solution 0.25 mg Bid and bid prn 30 mL 11     trolamine salicylate (ASPERCREME) 10 % external cream Apply 1 g topically 2 times daily           REVIEW OF SYSTEMS:  Limited secondary to cognitive impairment but today pt reports R knee feels ok    Objective:  /80   Pulse 70   Temp 97.2  F (36.2  C)   Resp 16   Ht 1.524 m (5')   SpO2 94%   BMI 18.55 kg/m    Exam:  GENERAL APPEARANCE:  Alert, in no distress, cooperative  ENT:  Mouth and posterior oropharynx normal, moist mucous membranes, [place]  EYES:  EOM, conjunctivae, lids, pupils and irises normal, PERRL  NECK:  neck flexion  RESP:  respiratory effort and palpation of chest normal, lungs clear to auscultation , no respiratory distress, diminished breath sounds bibasilar  CV:  Palpation and auscultation of heart done , regular rate and rhythm, no murmur, rub, or gallop, peripheral edema 1+ in LEs  ABDOMEN:  normal bowel sounds, soft,  nontender, no hepatosplenomegaly or other masses, no guarding or rebound  M/S:   muscle strength 5/5 all 4 ext., some gen. weakness. stands indep. severe kyphosis  NEURO:   Cranial nerves 2-12 are normal tested and grossly at patient's baseline, no tremor  PSYCH:  insight and judgement impaired, memory impaired , affect abnormal -flat    Labs:     Most Recent 3 BMP's:Recent Labs   Lab Test 12/13/21  1803      POTASSIUM 3.5   CHLORIDE 110*   CO2 25   BUN 19   CR 0.54   ANIONGAP 6   MALICK 8.6   *       ASSESSMENT/PLAN:  (R29.898) Weakness of both lower extremities  (primary encounter diagnosis)  Comment: decrease in amb. UTI.  Plan: 1. Start cipro for UTI  2. Monitor for fever  3. Encourage fluids  4. Staff to increase amb. As roseann, if no improvement over next few days, refer to PT    (M25.561) Acute pain of right knee  Comment: appears stable. XR done neg  Plan: 1. Cont. Bid aspercreme  2. Monitor for difficulty with standing  3. For ongoing pain may try tylenol    (G30.1,  F02.818) Late onset Alzheimer's dementia with behavioral disturbance (H)  Comment: memory loss. Recent increase in confusion, agitation  Plan: 1. tx for UTI as above  2. Cont. Sched, prn risperdal  3. Reassess over next week after UTI tx, for ongoing s/s may increase sched. risperdal dose    Electronically signed by:  LAY Wheatley CNP

## 2022-10-31 NOTE — LETTER
10/31/2022        RE: [facility]els [facility]  [address]  [address]  [place] [address]        [place] [facility]  [place] Medical Record Number:  [id number]  Place of Service where encounter took place:  [facility] [employer]  [facility] ([facility]) [[id number]]  Chief Complaint   Patient presents with     Fatigue       HPI:    Jewels Cortez  is a 87 year old (1935), who is being seen today for an episodic care visit.  HPI information obtained from: facility chart records, facility staff, patient report and [facility] chart review. Today's concern is: weakness, R knee pain, alzheimer's. Has had gne. Weakness, unable to amb. Last week.  Has had R knee pain.  Started on aspercreme. R knee pain improved. Was not amb. Had increased confusion, agitation. Cont. On risperdal for alzheimer's. UA appeared +. UC 50-100k urogenital organisms. Afebrile.       Past Medical and Surgical History reviewed in Epic today.    MEDICATIONS:    Current Outpatient Medications   Medication Sig Dispense Refill     ciprofloxacin (CIPRO) 250 MG tablet Take 250 mg by mouth 2 times daily       risperiDONE (RISPERDAL) 1 MG/ML solution 0.25 mg Bid and bid prn 30 mL 11     trolamine salicylate (ASPERCREME) 10 % external cream Apply 1 g topically 2 times daily           REVIEW OF SYSTEMS:  Limited secondary to cognitive impairment but today pt reports R knee feels ok    Objective:  /80   Pulse 70   Temp 97.2  F (36.2  C)   Resp 16   Ht 1.524 m (5')   SpO2 94%   BMI 18.55 kg/m    Exam:  GENERAL APPEARANCE:  Alert, in no distress, cooperative  ENT:  Mouth and posterior oropharynx normal, moist mucous membranes, [place]  EYES:  EOM, conjunctivae, lids, pupils and irises normal, PERRL  NECK:  neck flexion  RESP:  respiratory effort and palpation of chest normal, lungs clear to auscultation , no respiratory distress, diminished breath sounds bibasilar  CV:  Palpation and auscultation of heart done , regular  rate and rhythm, no murmur, rub, or gallop, peripheral edema 1+ in LEs  ABDOMEN:  normal bowel sounds, soft, nontender, no hepatosplenomegaly or other masses, no guarding or rebound  M/S:   muscle strength 5/5 all 4 ext., some gen. weakness. stands indep. severe kyphosis  NEURO:   Cranial nerves 2-12 are normal tested and grossly at patient's baseline, no tremor  PSYCH:  insight and judgement impaired, memory impaired , affect abnormal -flat    Labs:     Most Recent 3 BMP's:Recent Labs   Lab Test 12/13/21  1803      POTASSIUM 3.5   CHLORIDE 110*   CO2 25   BUN 19   CR 0.54   ANIONGAP 6   MALICK 8.6   *       ASSESSMENT/PLAN:  (R29.898) Weakness of both lower extremities  (primary encounter diagnosis)  Comment: decrease in amb. UTI.  Plan: 1. Start cipro for UTI  2. Monitor for fever  3. Encourage fluids  4. Staff to increase amb. As roseann, if no improvement over next few days, refer to PT    (M25.561) Acute pain of right knee  Comment: appears stable. XR done neg  Plan: 1. Cont. Bid aspercreme  2. Monitor for difficulty with standing  3. For ongoing pain may try tylenol    (G30.1,  F02.818) Late onset Alzheimer's dementia with behavioral disturbance (H)  Comment: memory loss. Recent increase in confusion, agitation  Plan: 1. tx for UTI as above  2. Cont. Sched, prn risperdal  3. Reassess over next week after UTI tx, for ongoing s/s may increase sched. risperdal dose    Electronically signed by:  LAY Wheatley CNP                 Sincerely,        LAY Wheatley CNP

## 2022-11-03 NOTE — PHARMACY-ADMISSION MEDICATION HISTORY
Admission medication history interview status for this patient is complete. See [facility] admission navigator for allergy information, prior to admission medications and immunization status.     Medication history interview done, indicate source(s): none  Medication history resources (including written lists, pill bottles, clinic record):Medlist from [facility]      Changes made to PTA medication list:  Added: APAP  Changed: none  Reported as Not Taking: none  Removed: none    Actions taken by pharmacist (provider contacted, etc):None     Additional medication history information:None    Medication reconciliation/reorder completed by provider prior to medication history?  n   (Y/N)     For patients on insulin therapy:   Do you use sliding scale insulin based on blood sugars?   What is your pre-meal insulin coverage?    Do you typically eat three meals a day?   How many times do you check your blood glucose per day?   How many episodes of hypoglycemia do you typically have per month?   Do you have a Continuous Glucose Monitor (CGM)?      Prior to Admission medications    Medication Sig Last Dose Taking? Auth Provider Long Term End Date   acetaminophen (TYLENOL) 32 mg/mL liquid Take 650 mg by mouth every 4 hours as needed for fever or mild pain  Yes Unknown, Entered By History     ciprofloxacin (CIPRO) 250 MG tablet Take 250 mg by mouth 2 times daily  Yes Reported, Patient  11/7/22   risperiDONE (RISPERDAL) 1 MG/ML solution 0.25 mg Bid and bid prn  Yes Holden Block, LAY CNP Yes    trolamine salicylate (ASPERCREME) 10 % external cream Apply 1 g topically 2 times daily  Yes Reported, Patient            Patient unable to complete

## 2022-11-03 NOTE — PLAN OF CARE
Goal Outcome Evaluation:       Alert to self only. Pt. Is confusion, talking to herself, auditory hallucinations. Pt. Does not open her eyes. Up with assist of 2, GB and walker. VSS, BP elevated. On RA. Swelling below right eye and on left eyelid also edema to BL LE. Will continue to monitor.

## 2022-11-03 NOTE — LETTER
11/3/2022        RE: [facility]els Wilkes Of [facility]  [address]  [address]  [facility] [address]        [place] [facility]  [place] Medical Record Number:  [id number]  Place of Service where encounter took place:  [facility] [employer]  [employer] ([facility]) [[id number]]  Chief Complaint   Patient presents with     Fatigue       HPI:    Jewels Cortez  is a 87 year old (1935), who is being seen today for an episodic care visit.  HPI information obtained from: facility chart records, facility staff and [facility] chart review. Today's concern is: lethargy, alzheimer's, weakness. Has alzheimer's, memory loss. Transferred to secured memory care unit, changed to liq. risperdal-had h/o refusing meds.  Since this time had stable mood, behaviors, amb. with walker. Has been taking risperdal.  10/27/22 noted to have R knee pain- XR neg. At this time noted to have increased confusion, agitation, some decrease in amb, requiring w.c. at times. UA done-mixture of organisms, empirically tx with cipro which has not been effective. Has a few days left on course. Has had increased lethargy, further decrease in amb. Today further decrease in responsiveness. Does respond to verbal stim. No apparent focal neurological deficits.  Has severe kyphosis. Unable to sit up in chair.       Past Medical and Surgical History reviewed in Epic today.    MEDICATIONS:    Current Outpatient Medications   Medication Sig Dispense Refill     ciprofloxacin (CIPRO) 250 MG tablet Take 250 mg by mouth 2 times daily       risperiDONE (RISPERDAL) 1 MG/ML solution 0.25 mg Bid and bid prn 30 mL 11     trolamine salicylate (ASPERCREME) 10 % external cream Apply 1 g topically 2 times daily           REVIEW OF SYSTEMS:  Unobtainable secondary to cognitive impairment.     Objective:  /67   Pulse 70   Resp 18   Ht 1.524 m (5')   SpO2 92%   BMI 18.55 kg/m    Exam:  GENERAL APPEARANCE:  lethargic, no apparent restlessness. sitting in  w.c. leaning forward  ENT:  Mouth and posterior oropharynx normal, moist mucous membranes, [place]  EYES:  EOM, conjunctivae, lids, pupils and irises normal, PERRL, some periorbital edema both eyes  NECK:  neck flexion  RESP:  respiratory effort and palpation of chest normal, lungs clear to auscultation , no respiratory distress, diminished breath sounds bibasilar  CV:  Palpation and auscultation of heart done , regular rate and rhythm, no murmur, rub, or gallop, peripheral edema 1-2+ in LEs, R>L  ABDOMEN:  normal bowel sounds, soft, nontender, no hepatosplenomegaly or other masses, no guarding or rebound  M/S:   gen. weakness, trunk weakness. difficulty sitting up.  NEURO:   speech soft, fluid, no tremor seen. UE strength equal bilat  PSYCH:  no apparent anxiety, affect abnormal -flat    Labs:     Most Recent 3 CBC's:Recent Labs   Lab Test 12/13/21  1803   WBC 10.7   HGB 11.7   MCV 95        Most Recent 3 BMP's:Recent Labs   Lab Test 12/13/21  1803      POTASSIUM 3.5   CHLORIDE 110*   CO2 25   BUN 19   CR 0.54   ANIONGAP 6   MALICK 8.6   *       ASSESSMENT/PLAN:  (R53.83) Lethargy  (primary encounter diagnosis)  Comment: increase in s/s over past few days, sig increase today. Unclear etiology. Cont. On cipro for UTI  Plan: 1. [facility] to [facility]  2. May consider hold of risperdal until more alert    (G30.1,  F02.818) Late onset Alzheimer's dementia with behavioral disturbance (H)  Comment: mood, behaviors had been stable on risperdal, secured memory care unit until 10/27/22 with increased confusion, some decline in function.  Plan: 1. May hold sched. risperdal as above  2. Cont. Prn risperdal  3. Monitor for delusions, paranoia.  Has h/o resistiveness with taking meds    (R29.898) Weakness of both lower extremities  Comment: increase in s/s over past couple weeks, more acute today-also have trunk weakness-difficulty sitting up  Plan: 1. Cont. Current transport w.c.  2. Consider PT/OT  3. May need eval  for new w.cBeatriz    Electronically signed by:  LAY Wheatley CNP                 Sincerely,        LAY Wheatley CNP

## 2022-11-03 NOTE — PROGRESS NOTES
[facility]  [place] Medical Record Number:  [id number]  Place of Service where encounter took place:  [facility]  [facility]) [[id number]]  Chief Complaint   Patient presents with     Fatigue       HPI:    Jewels Cortez  is a 87 year old (1935), who is being seen today for an episodic care visit.  HPI information obtained from: facility chart records, facility staff and [facility] chart review. Today's concern is: lethargy, alzheimer's, weakness. Has alzheimer's, memory loss. Transferred to [facility] memory care unit, changed to liq. risperdal-had h/o refusing meds.  Since this time had stable mood, behaviors, amb. with walker. Has been taking risperdal.  10/27/22 noted to have R knee pain- XR neg. At this time noted to have increased confusion, agitation, some decrease in amb, requiring w.c. at times. UA done-mixture of organisms, empirically tx with cipro which has not been effective. Has a few days left on course. Has had increased lethargy, further decrease in amb. Today further decrease in responsiveness. Does respond to verbal stim. No apparent focal neurological deficits.  Has severe kyphosis. Unable to sit up in chair.       Past Medical and Surgical History reviewed in Epic today.    MEDICATIONS:    Current Outpatient Medications   Medication Sig Dispense Refill     ciprofloxacin (CIPRO) 250 MG tablet Take 250 mg by mouth 2 times daily       risperiDONE (RISPERDAL) 1 MG/ML solution 0.25 mg Bid and bid prn 30 mL 11     trolamine salicylate (ASPERCREME) 10 % external cream Apply 1 g topically 2 times daily           REVIEW OF SYSTEMS:  Unobtainable secondary to cognitive impairment.     Objective:  /67   Pulse 70   Resp 18   Ht 1.524 m (5')   SpO2 92%   BMI 18.55 kg/m    Exam:  GENERAL APPEARANCE:  lethargic, no apparent restlessness. sitting in w.c. leaning forward  ENT:  Mouth and posterior oropharynx normal, moist mucous membranes, [place]  EYES:  EOM,  conjunctivae, lids, pupils and irises normal, PERRL, some periorbital edema both eyes  NECK:  neck flexion  RESP:  respiratory effort and palpation of chest normal, lungs clear to auscultation , no respiratory distress, diminished breath sounds bibasilar  CV:  Palpation and auscultation of heart done , regular rate and rhythm, no murmur, rub, or gallop, peripheral edema 1-2+ in LEs, R>L  ABDOMEN:  normal bowel sounds, soft, nontender, no hepatosplenomegaly or other masses, no guarding or rebound  M/S:   gen. weakness, trunk weakness. difficulty sitting up.  NEURO:   speech soft, fluid, no tremor seen. UE strength equal bilat  PSYCH:  no apparent anxiety, affect abnormal -flat    Labs:     Most Recent 3 CBC's:Recent Labs   Lab Test 12/13/21  1803   WBC 10.7   HGB 11.7   MCV 95        Most Recent 3 BMP's:Recent Labs   Lab Test 12/13/21  1803      POTASSIUM 3.5   CHLORIDE 110*   CO2 25   BUN 19   CR 0.54   ANIONGAP 6   MALICK 8.6   *       ASSESSMENT/PLAN:  (R53.83) Lethargy  (primary encounter diagnosis)  Comment: increase in s/s over past few days, sig increase today. Unclear etiology. Cont. On cipro for UTI  Plan: 1. Send to [facility] for eval  2. May consider hold of risperdal until more alert    (G30.1,  F02.818) Late onset Alzheimer's dementia with behavioral disturbance (H)  Comment: mood, behaviors had been stable on risperdal, secured memory care unit until 10/27/22 with increased confusion, some decline in function.  Plan: 1. May hold sched. risperdal as above  2. Cont. Prn risperdal  3. Monitor for delusions, paranoia.  Has h/o resistiveness with taking meds    (R29.898) Weakness of both lower extremities  Comment: increase in s/s over past couple weeks, more acute today-also have trunk weakness-difficulty sitting up  Plan: 1. Cont. Current transport w.c.  2. Consider PT/OT  3. May need eval for new w.c.    Electronically signed by:  LAY Wheatley CNP

## 2022-11-03 NOTE — ED PROVIDER NOTES
History   Chief Complaint:  Altered Mental Status       The history is provided by the patient. The history is limited by the condition of the patient.      Jewels Cortez is a 87-year-old female with past medical history significant for dementia, atrial fibrillation who presents to the emergency department via EMS with a chief complaint of decreased responsiveness, generalized weakness and confusion.  Patient has reportedly been taking her liquid Risperdal.  Today she was noted to have increased confusion, agitation, decreased ambulation which is reportedly not her baseline.  She is currently being treated with ciprofloxacin for possible UTI.    Review of Systems   Reason unable to perform ROS: verbally unresponsive    Constitutional: Positive for activity change.   Neurological: Positive for weakness.   Psychiatric/Behavioral: Positive for agitation and confusion.       Allergies:  Penicillins  Sulfa Drugs    Medications:  Ciprofloxacin   Risperidone    Past Medical History:     Bilateral sensorineural hearing loss  Right leg swelling  Onychomycosis due to dermatophyte  Dementia   Postural kyphosis   Confusion   Atrial fibrillation   Paranoia   Closed fracture of left wrist   Pseudophakia   Presbyopia     Past Surgical History:    Bilateral lift surgery   Cataract IOL   Cervical neck surgery   Hysterectomy   Lumbar back surgery   Skin cancer removal    Social History:  Presents via EMS   PCP: Holden Block     Physical Exam     Patient Vitals for the past 24 hrs:   BP Temp Temp src Pulse SpO2   11/03/22 1326 (!) 173/85 98.6  F (37  C) Oral 92 96 %       Physical Exam  Constitutional: Well developed, ill, nontox appearance  Head: Atraumatic.   Mouth/Throat: Oropharynx is clear and moist.   Neck:  no stridor  Eyes: no scleral icterus, nausea, right periorbital edema  Cardiovascular: RRR, 2+ bilat radial pulses  Pulmonary/Chest: nml resp effort, Clear BS bilat  Abdominal: ND, soft, NT, no rebound or  guarding   Ext: Warm, well perfused, no edema  Neurological: decreased alertrness, nonverbal  Skin: Skin is warm and dry.   Psychiatric: Unresponsive  Nursing note and vitals reviewed.    Emergency Department Course   ECG  ECG taken at 1405, ECG read at 1407  Normal sinus rhythma with sinus arrhythmia   Nonspecific ST abnormality   Abnormal ECG    No significant change as compared to prior, dated 12/13/21.  Rate 82 bpm. GA interval 166 ms. QRS duration 72 ms. QT/QTc 358/418 ms. P-R-T axes 92 45 18.   ECG  ECG taken at 1613, ECG read at 1615  Sinus rhythm with premature supraventricular complexes   Nonspecific ST and T wave abnormality   Abnormal ECG   No change as compared to prior, dated 11/3/22.  Rate 88 bpm. GA interval 166 ms. QRS duration 74 ms. QT/QTc 354/428 ms. P-R-T axes * 8 69.     Imaging:  XR Chest 2 Views   Final Result   IMPRESSION: Stable cardiomediastinal silhouette. No focal airspace   consolidation, pleural effusion or pneumothorax. Significant sternal   irregularity on the lateral view, favor sequela of prior trauma but   recommend correlation with point tenderness.      SAMEER MOJICA MD            SYSTEM ID:  [id number]      CT Head w/o Contrast   Final Result   IMPRESSION:    No acute intracranial abnormality identified.      HENNA STEINER MD            SYSTEM ID:  [id number]        Report per radiology    Laboratory:  Labs Ordered and Resulted from Time of ED Arrival to Time of ED Departure   COMPREHENSIVE METABOLIC PANEL - Abnormal       Result Value    Sodium 140      Potassium 4.3      Chloride 103      Carbon Dioxide (CO2) 25      Anion Gap 12      Urea Nitrogen 13.8      Creatinine 0.47 (*)     Calcium 8.7 (*)     Glucose 95      Alkaline Phosphatase 109 (*)     AST 20      ALT 11      Protein Total 7.0      Albumin 4.0      Bilirubin Total 0.4      GFR Estimate >90     TROPONIN T, HIGH SENSITIVITY - Abnormal    Troponin T, High Sensitivity 15 (*)    ROUTINE UA WITH MICROSCOPIC REFLEX TO  CULTURE - Abnormal    Color Urine Yellow      Appearance Urine Slightly Cloudy (*)     Glucose Urine Negative      Bilirubin Urine Negative      Ketones Urine Negative      Specific Gravity Urine 1.026      Blood Urine Negative      pH Urine 5.0      Protein Albumin Urine 30 (*)     Urobilinogen Urine Normal      Nitrite Urine Negative      Leukocyte Esterase Urine Negative      Mucus Urine Present (*)     RBC Urine 8 (*)     WBC Urine 6 (*)     Squamous Epithelials Urine <1     CBC WITH PLATELETS AND DIFFERENTIAL - Abnormal    WBC Count 8.5      RBC Count 3.83      Hemoglobin 9.7 (*)     Hematocrit 32.9 (*)     MCV 86      MCH 25.3 (*)     MCHC 29.5 (*)     RDW 15.9 (*)     Platelet Count 406      % Neutrophils 73      % Lymphocytes 14      % Monocytes 10      % Eosinophils 2      % Basophils 1      % Immature Granulocytes 0      NRBCs per 100 WBC 0      Absolute Neutrophils 6.2      Absolute Lymphocytes 1.2      Absolute Monocytes 0.8      Absolute Eosinophils 0.1      Absolute Basophils 0.1      Absolute Immature Granulocytes 0.0      Absolute NRBCs 0.0     ISTAT GASES LACTATE VENOUS POCT - Abnormal    Lactic Acid POCT 0.8      Bicarbonate Venous POCT 27      O2 Sat, Venous POCT 86 (*)     pCO2V Venous POCT 39 (*)     pH Venous POCT 7.45 (*)     pO2 Venous POCT 49 (*)    TROPONIN T, HIGH SENSITIVITY - Abnormal    Troponin T, High Sensitivity 16 (*)    MAGNESIUM - Normal    Magnesium 2.1     TSH WITH FREE T4 REFLEX - Normal    TSH 1.33     INR - Normal    INR 1.08     AMMONIA - Normal    Ammonia 26     AMMONIA    Ammonia       INFLUENZA A/B & SARS-COV2 PCR MULTIPLEX      Emergency Department Course:     Reviewed:  I reviewed nursing notes, vitals, past medical history and Care Everywhere    Assessments:  1335 I obtained history and examined the patient as noted above.   1515 I spoke with Jewels's niece and nephew, who are her POA's, and discussed the plan  1558 I rechecked the patient and explained admission.          Disposition:  The patient was admitted to the hospital under the care of Dr. Jose.     Impression & Plan   Medical Decision Makin year old female presenting w/ altered level of consciousness     DDx includes infection such as UTI or pneumonia, electrolyte abnormality, toxic encephalopathy, intracranial abnormality such as hemorrhage or ischemic CVA.  EKG interpretation as noted above.  Labs significant for UA equivocal for infection, labs otherwise largely unremarkable.  Imaging sig for no acute intracranial abnormality; chest x-ray negative for acute cardiopulmonary disease.  Given the patient's significant lethargy and decreased mental status from baseline, I think would be reasonable to observe her in the hospital tonight.  She is subsequently admitted to the hospitalist service for further evaluation and management. The pt's POAs were counseled on all results, disposition and diagnosis.  They are understanding and agreeable to plan. Patient admitted in stable condition.       Diagnosis:    ICD-10-CM    1. Generalized weakness  R53.1       2. Confusion  R41.0             Scribe Disclosure:  I, Ankita Ta, am serving as a scribe at 2:01 PM on 11/3/2022 to document services personally performed by Tremaine Lester MD based on my observations and the provider's statements to me.          Tremaine Lester MD  22 1633       Tremaine Lester MD  22 1634

## 2022-11-03 NOTE — ED NOTES
"[facility]  ED Nurse Handoff Report    Jewels Cortez is a 87 year old female   ED Chief complaint: Altered Mental Status  . ED Diagnosis:   Final diagnoses:   Generalized weakness   Confusion     Allergies:   Allergies   Allergen Reactions     Penicillins Unknown     Sulfa Drugs Unknown       Code Status: Full Code  Activity level - Baseline/Home:  Assist X 1. Activity Level - Current:   Assist X 1. Lift room needed: No. Bariatric: No   Needed: No   Isolation: No. Infection: Not Applicable.     Vital Signs:   Vitals:    11/03/22 1326   BP: (!) 173/85   Pulse: 92   Temp: 98.6  F (37  C)   TempSrc: Oral   SpO2: 96%       Cardiac Rhythm:  ,      Pain level:    Patient confused: Yes. Patient Falls Risk: Yes.   Elimination Status: Has voided   Patient Report - Initial Complaint: worsening confusion. Focused Assessment: PT arrived EMS from memory care unit. Pt in memory care unit for dementia. EMS reports increased confusion and staff witness pt talking to herself and experiencing auditory hallucinations. Pt was diagnosed with UTI 3 days ago and was put on oral antibiotics 3 days ago. EMS [id number]. Upon arrival in ER pt only responded to being undressed and repeating \"I'm cold\", pt does not respond to touch or voice. Pt does not open eyes and is hunched over. Pt presents with swelling below right eye and on left eyelid.   Tests Performed: labs, imaging. Abnormal Results:   Labs Ordered and Resulted from Time of ED Arrival to Time of ED Departure   COMPREHENSIVE METABOLIC PANEL - Abnormal       Result Value    Sodium 140      Potassium 4.3      Chloride 103      Carbon Dioxide (CO2) 25      Anion Gap 12      Urea Nitrogen 13.8      Creatinine 0.47 (*)     Calcium 8.7 (*)     Glucose 95      Alkaline Phosphatase 109 (*)     AST 20      ALT 11      Protein Total 7.0      Albumin 4.0      Bilirubin Total 0.4      GFR Estimate >90     TROPONIN T, HIGH SENSITIVITY - Abnormal    Troponin T, High " Sensitivity 15 (*)    ROUTINE UA WITH MICROSCOPIC REFLEX TO CULTURE - Abnormal    Color Urine Yellow      Appearance Urine Slightly Cloudy (*)     Glucose Urine Negative      Bilirubin Urine Negative      Ketones Urine Negative      Specific Gravity Urine 1.026      Blood Urine Negative      pH Urine 5.0      Protein Albumin Urine 30 (*)     Urobilinogen Urine Normal      Nitrite Urine Negative      Leukocyte Esterase Urine Negative      Mucus Urine Present (*)     RBC Urine 8 (*)     WBC Urine 6 (*)     Squamous Epithelials Urine <1     CBC WITH PLATELETS AND DIFFERENTIAL - Abnormal    WBC Count 8.5      RBC Count 3.83      Hemoglobin 9.7 (*)     Hematocrit 32.9 (*)     MCV 86      MCH 25.3 (*)     MCHC 29.5 (*)     RDW 15.9 (*)     Platelet Count 406      % Neutrophils 73      % Lymphocytes 14      % Monocytes 10      % Eosinophils 2      % Basophils 1      % Immature Granulocytes 0      NRBCs per 100 WBC 0      Absolute Neutrophils 6.2      Absolute Lymphocytes 1.2      Absolute Monocytes 0.8      Absolute Eosinophils 0.1      Absolute Basophils 0.1      Absolute Immature Granulocytes 0.0      Absolute NRBCs 0.0     ISTAT GASES LACTATE VENOUS POCT - Abnormal    Lactic Acid POCT 0.8      Bicarbonate Venous POCT 27      O2 Sat, Venous POCT 86 (*)     pCO2V Venous POCT 39 (*)     pH Venous POCT 7.45 (*)     pO2 Venous POCT 49 (*)    TROPONIN T, HIGH SENSITIVITY - Abnormal    Troponin T, High Sensitivity 16 (*)    MAGNESIUM - Normal    Magnesium 2.1     TSH WITH FREE T4 REFLEX - Normal    TSH 1.33     INR - Normal    INR 1.08     AMMONIA - Normal    Ammonia 26     AMMONIA    Ammonia       INFLUENZA A/B & SARS-COV2 PCR MULTIPLEX     XR Chest 2 Views   Final Result   IMPRESSION: Stable cardiomediastinal silhouette. No focal airspace   consolidation, pleural effusion or pneumothorax. Significant sternal   irregularity on the lateral view, favor sequela of prior trauma but   recommend correlation with point tenderness.       SAMEER MOJICA MD            SYSTEM ID:  [id number]      CT Head w/o Contrast   Final Result   IMPRESSION:    No acute intracranial abnormality identified.      HENNA STEINER MD            SYSTEM ID:  [id number]        .   Treatments provided: see MAR  Family Comments: N/A  OBS brochure/video discussed/provided to patient:  Yes  ED Medications: Medications - No data to display  Drips infusing:  No  For the majority of the shift, the patient's behavior Green. Interventions performed were N/A.    Sepsis treatment initiated: No     Patient tested for COVID 19 prior to admission: YES    ED Nurse Name/Phone Number: Estefanía Rod RN,   4:37 PM    RECEIVING UNIT ED HANDOFF REVIEW    Above ED Nurse Handoff Report was reviewed: Yes  Reviewed by: Evie Wheeler RN on November 3, 2022 at 10:12 PM

## 2022-11-03 NOTE — H&P
[facility]    History and Physical - Hospitalist Service       Date of Admission:  11/3/2022    Assessment & Plan      Jewels Cortez is a 87 year old female admitted on 11/3/2022.     87-year-old female with history of dementia who lives in [facility] presents with 2 to 3-day history of increasing confusion and decreased ambulation and responsiveness.  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  She was started on ciprofloxacin on on 10/31 for suspected UTI as she was found to have positive leukocyte esterase but negative nitrite and leukocyte esterase.    I talked to RN at [facility], Ms Graf to get the above information.  I was unable to reach patient's niece Tiffanie.  Patient is very hard of hearing and could not give me any meaningful history.      1. Encephalopathy in setting of dementia    Unclear if there is an acute exacerbating factor or it is just worsening of her underlying dementia.  I think it is just worsening of her underlying dementia which might have been worsened by recent start of ciprofloxacin.    No major metabolic abnormalities on CMP.  CBC and VBG unremarkable and ammonia was normal at 26.  UA showed only 6 WBCs with negative nitrate and leukocyte esterase.  CT head without acute changes but showed volume loss and chronic small vessel ischemic changes.  Chest x-ray without acute abnormalities.    Will place the patient under observation.    Patient was recently started on ciprofloxacin, that could be an factor in her confusion.  She is also on Risperdal 0.25 mg twice daily and twice daily as needed, will use only as needed in case of agitation.    2. Abnormal UA.    Patient is already taking ciprofloxacin for 3 days, and absence of other UTI symptoms will not continue antibiotics    3. Anemia    Hemoglobin is 9.7, baseline about 11.       Diet:  Soft diet  DVT Prophylaxis:  Pneumatic Compression Devices  Todd Catheter: Not present  Central Lines: None  Cardiac Monitoring: None  Code Status:  Full code as per the RN at her [facility] care Ms. Graf.  I was not able to reach her niece and POA Ms. Cardona for further discussion.    Clinically Significant Risk Factors Present on Admission                    # Dementia: noted on problem list           Disposition Plan      Expected Discharge Date: 11/04/2022                The patient's care was discussed with the ER physician and patient's RN at [facility]..    Manjeet Jose MD  Hospitalist Service  [facility]  Securely message with the Vocera Web Console (learn more here)  Text page via [facility] Paging/Directory     ______________________________________________________________________    Chief Complaint   Confusion, decreased level of consciousness and decreased ambulation    History obtained from ER physician and patient's RN at [facility].    History of Present Illness   Jewels Cortez is a 87 year old female admitted on 11/3/2022.     87-year-old female with history of dementia who lives in [facility] presents with 2 to 3-day history of increasing confusion and decreased ambulation and responsiveness.  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  She was started on ciprofloxacin on on 10/31 for suspected UTI as she was found to have positive leukocyte esterase but negative nitrite and leukocyte esterase.    I talked to RN at [facility], Ms Graf to get the above information.  I was unable to reach patient's niece Tiffanie.     Review of Systems    Review of systems not obtained due to patient factors - confusion    Past Medical History    I have reviewed this patient's medical history and updated it with pertinent information if needed.   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  Past Surgical History:   Procedure Laterality Date     BLADDER LIFT SURGERY       CATARACT IOL, RT/LT Bilateral     surgery done in [place]     CERVICAL NECK SURGERY       HYSTERECTOMY       LUMBAR BACK SURGERY       skin cancer      removal of earlobe and face       Social History   I have reviewed this patient's social history and updated it with pertinent information if needed.  Social History     Tobacco Use     Smoking status: Never     Smokeless tobacco: Never   Substance Use Topics     Alcohol use: Not Currently     Drug use: Never       Family History     Unable to obtain due to: Confusion    Prior to Admission Medications   Prior to Admission Medications   Prescriptions Last Dose Informant Patient Reported? Taking?   acetaminophen (TYLENOL) 32 mg/mL liquid   Yes Yes   Sig: Take 650 mg by mouth every 4 hours as needed for fever or mild pain   ciprofloxacin (CIPRO) 250 MG tablet   Yes Yes   Sig: Take 250 mg by mouth 2 times daily For 7 days   risperiDONE (RISPERDAL) 1 MG/ML solution   No Yes   Si.25 mg Bid and bid prn   trolamine salicylate (ASPERCREME) 10 % external cream   Yes Yes   Sig: Apply 1 g topically 2 times daily      Facility-Administered Medications: None     Allergies   Allergies   Allergen Reactions     Penicillins Unknown     Sulfa Drugs Unknown       Physical Exam   Vital Signs: Temp: 98.6  F (37  C) Temp src: Oral BP: (!) 141/81 Pulse: 89   Resp: 18 SpO2: 98 % O2 Device: None (Room air)    Weight: 0 lbs  0 oz    General Appearance: Frail elderly female who appears confused and picking at her gown.  She is very hard of hearing.  Eyes: No icterus  HEENT: Moist mucosa  Respiratory: Clear to auscultation  Cardiovascular: S1 and S2 irregular  GI: Soft and nontender  Lymph/Hematologic: Not examined  Genitourinary: Not examined  Skin: No rash  Musculoskeletal: Bilateral lower extremity edema  Neurologic: Nonfocal  Psychiatric: Appears confused      Data   Data reviewed today: I reviewed all medications, new labs and imaging results over the last 24 hours.     Recent Labs   Lab 11/03/22  1340   WBC 8.5   HGB 9.7*   MCV 86      INR 1.08      POTASSIUM 4.3   CHLORIDE 103   CO2 25   BUN 13.8   CR 0.47*   ANIONGAP 12   MALICK 8.7*   GLC 95   ALBUMIN 4.0   PROTTOTAL 7.0   BILITOTAL 0.4   ALKPHOS 109*   ALT 11   AST 20     Recent Results (from the past 24 hour(s))   CT Head w/o Contrast    Narrative    CT SCAN OF THE HEAD WITHOUT CONTRAST   11/3/2022 3:06 PM     HISTORY: Altered mental status    TECHNIQUE:  Axial images of the head and coronal reformations without  IV contrast material. Radiation dose for this scan was reduced using  automated exposure control, adjustment of the mA and/or kV according  to patient size, or iterative reconstruction technique.    COMPARISON: Head CT 12/13/2021    FINDINGS:  Artifact coursing through the central volume of view, presumably  related to suboptimal patient positioning. No evidence of hemorrhage.  Volume loss and white matter hypoattenuation likely representing  chronic small vessel ischemic change. No acute osseous abnormality.      Impression    IMPRESSION:   No acute intracranial abnormality identified.    HENNA STEINER MD         SYSTEM ID:  [id number]   XR Chest 2 Views    Narrative    XR CHEST 2 VIEWS   11/3/2022 3:43 PM     HISTORY: altered mental status, infectious work-up    COMPARISON: Chest radiograph 12/13/2021      Impression    IMPRESSION: Stable cardiomediastinal  silhouette. No focal airspace  consolidation, pleural effusion or pneumothorax. Significant sternal  irregularity on the lateral view, favor sequela of prior trauma but  recommend correlation with point tenderness.    SAMEER MOJICA MD         SYSTEM ID:  [id number]

## 2022-11-03 NOTE — ED TRIAGE NOTES
"PT arrived EMS from memory care unit. Pt in memory care unit for dementia. EMS reports increased confusion and staff witness pt talking to herself and experiencing auditory hallucinations. Pt was diagnosed with UTI 3 days ago and was put on oral antibiotics 3 days ago. EMS AD=977. Upon arrival in ER pt only responded to being undressed and repeating \"I'm cold\", pt does not respond to touch or voice. Pt does not open eyes and is hunched over. Pt presents with swelling below right eye and on left eyelid.     Triage Assessment     Row Name 11/03/22 1321       Triage Assessment (Adult)    Airway WDL WDL       Respiratory WDL    Respiratory WDL WDL       Skin Circulation/Temperature WDL    Skin Circulation/Temperature WDL X  swelling under eyes       Cognitive/Neuro/Behavioral WDL    Cognitive/Neuro/Behavioral WDL arousability    Arousal Level arouses to pain              "

## 2022-11-03 NOTE — H&P (VIEW-ONLY)
[facility]    History and Physical - Hospitalist Service       Date of Admission:  11/3/2022    Assessment & Plan      Jewels Cortez is a 87 year old female admitted on 11/3/2022.     87-year-old female with history of dementia who lives in [facility] presents with 2 to 3-day history of increasing confusion and decreased ambulation and responsiveness.  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  She was started on ciprofloxacin on on 10/31 for suspected UTI as she was found to have positive leukocyte esterase but negative nitrite and leukocyte esterase.    I talked to RN at [facility], Ms Graf to get the above information.  I was unable to reach patient's niece Tiffanie.  Patient is very hard of hearing and could not give me any meaningful history.      1. Encephalopathy in setting of dementia    Unclear if there is an acute exacerbating factor or it is just worsening of her underlying dementia.  I think it is just worsening of her underlying dementia which might have been worsened by recent start of ciprofloxacin.    No major metabolic abnormalities on CMP.  CBC and VBG unremarkable and ammonia was normal at 26.  UA showed only 6 WBCs with negative nitrate and leukocyte esterase.  CT head without acute changes but showed volume loss and chronic small vessel ischemic changes.  Chest x-ray without acute abnormalities.    Will place the patient under observation.    Patient was recently started on ciprofloxacin, that could be an factor in her confusion.  She is also on Risperdal 0.25 mg twice daily and twice daily as needed, will use only as needed in case of agitation.    2. Abnormal UA.    Patient is already taking ciprofloxacin for 3 days, and absence of other UTI symptoms will not continue antibiotics    3. Anemia    Hemoglobin is 9.7, baseline about 11.       Diet:  Soft diet  DVT Prophylaxis:  Pneumatic Compression Devices  Todd Catheter: Not present  Central Lines: None  Cardiac Monitoring: None  Code Status:  Full code as per the RN at her [facility] care Ms. Graf.  I was not able to reach her niece and POA Ms. Cardona for further discussion.    Clinically Significant Risk Factors Present on Admission                    # Dementia: noted on problem list           Disposition Plan      Expected Discharge Date: 11/04/2022                The patient's care was discussed with the ER physician and patient's RN at [facility]..    Manjeet Jose MD  Hospitalist Service  [facility]  Securely message with the Vocera Web Console (learn more here)  Text page via [facility] Paging/Directory     ______________________________________________________________________    Chief Complaint   Confusion, decreased level of consciousness and decreased ambulation    History obtained from ER physician and patient's RN at [facility].    History of Present Illness   Jewels Cortez is a 87 year old female admitted on 11/3/2022.     87-year-old female with history of dementia who lives in [facility] presents with 2 to 3-day history of increasing confusion and decreased ambulation and responsiveness.  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  She was started on ciprofloxacin on on 10/31 for suspected UTI as she was found to have positive leukocyte esterase but negative nitrite and leukocyte esterase.    I talked to RN at [facility], Ms Garf to get the above information.  I was unable to reach patient's niece Tiffanie.     Review of Systems    Review of systems not obtained due to patient factors - confusion    Past Medical History    I have reviewed this patient's medical history and updated it with pertinent information if needed.   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  Past Surgical History:   Procedure Laterality Date     BLADDER LIFT SURGERY       CATARACT IOL, RT/LT Bilateral     surgery done in [place]     CERVICAL NECK SURGERY       HYSTERECTOMY       LUMBAR BACK SURGERY       skin cancer      removal of earlobe and face       Social History   I have reviewed this patient's social history and updated it with pertinent information if needed.  Social History     Tobacco Use     Smoking status: Never     Smokeless tobacco: Never   Substance Use Topics     Alcohol use: Not Currently     Drug use: Never       Family History     Unable to obtain due to: Confusion    Prior to Admission Medications   Prior to Admission Medications   Prescriptions Last Dose Informant Patient Reported? Taking?   acetaminophen (TYLENOL) 32 mg/mL liquid   Yes Yes   Sig: Take 650 mg by mouth every 4 hours as needed for fever or mild pain   ciprofloxacin (CIPRO) 250 MG tablet   Yes Yes   Sig: Take 250 mg by mouth 2 times daily For 7 days   risperiDONE (RISPERDAL) 1 MG/ML solution   No Yes   Si.25 mg Bid and bid prn   trolamine salicylate (ASPERCREME) 10 % external cream   Yes Yes   Sig: Apply 1 g topically 2 times daily      Facility-Administered Medications: None     Allergies   Allergies   Allergen Reactions     Penicillins Unknown     Sulfa Drugs Unknown       Physical Exam   Vital Signs: Temp: 98.6  F (37  C) Temp src: Oral BP: (!) 141/81 Pulse: 89   Resp: 18 SpO2: 98 % O2 Device: None (Room air)    Weight: 0 lbs  0 oz    General Appearance: Frail elderly female who appears confused and picking at her gown.  She is very hard of hearing.  Eyes: No icterus  HEENT: Moist mucosa  Respiratory: Clear to auscultation  Cardiovascular: S1 and S2 irregular  GI: Soft and nontender  Lymph/Hematologic: Not examined  Genitourinary: Not examined  Skin: No rash  Musculoskeletal: Bilateral lower extremity edema  Neurologic: Nonfocal  Psychiatric: Appears confused      Data   Data reviewed today: I reviewed all medications, new labs and imaging results over the last 24 hours.     Recent Labs   Lab 11/03/22  1340   WBC 8.5   HGB 9.7*   MCV 86      INR 1.08      POTASSIUM 4.3   CHLORIDE 103   CO2 25   BUN 13.8   CR 0.47*   ANIONGAP 12   MALICK 8.7*   GLC 95   ALBUMIN 4.0   PROTTOTAL 7.0   BILITOTAL 0.4   ALKPHOS 109*   ALT 11   AST 20     Recent Results (from the past 24 hour(s))   CT Head w/o Contrast    Narrative    CT SCAN OF THE HEAD WITHOUT CONTRAST   11/3/2022 3:06 PM     HISTORY: Altered mental status    TECHNIQUE:  Axial images of the head and coronal reformations without  IV contrast material. Radiation dose for this scan was reduced using  automated exposure control, adjustment of the mA and/or kV according  to patient size, or iterative reconstruction technique.    COMPARISON: Head CT 12/13/2021    FINDINGS:  Artifact coursing through the central volume of view, presumably  related to suboptimal patient positioning. No evidence of hemorrhage.  Volume loss and white matter hypoattenuation likely representing  chronic small vessel ischemic change. No acute osseous abnormality.      Impression    IMPRESSION:   No acute intracranial abnormality identified.    HENNA STEINER MD         SYSTEM ID:  [id number]   XR Chest 2 Views    Narrative    XR CHEST 2 VIEWS   11/3/2022 3:43 PM     HISTORY: altered mental status, infectious work-up    COMPARISON: Chest radiograph 12/13/2021      Impression    IMPRESSION: Stable cardiomediastinal  silhouette. No focal airspace  consolidation, pleural effusion or pneumothorax. Significant sternal  irregularity on the lateral view, favor sequela of prior trauma but  recommend correlation with point tenderness.    SAMEER MOJICA MD         SYSTEM ID:  [id number]

## 2022-11-04 NOTE — CONSULTS
Care Management Initial Consult    General Information  Assessment completed with: Family, Dulce bueno  Type of CM/SW Visit: Initial Assessment    Primary Care Provider verified and updated as needed:     Readmission within the last 30 days:           Advance Care Planning:            Communication Assessment  Patient's communication style: spoken language (English or Bilingual)    Hearing Difficulty or Deaf: yes        Cognitive  Cognitive/Neuro/Behavioral: .WDL except, orientation  Level of Consciousness: confused, alert  Arousal Level: arouses to touch/gentle shaking  Orientation: disoriented to, place, time, situation  Mood/Behavior: anxious  Best Language: 0 - No aphasia  Speech: clear, hesitant    Living Environment:   People in home:       Current living Arrangements:        Able to return to prior arrangements:         Family/Social Support:  Care provided by:    Provides care for:    Marital Status:   Other (specify) (niece and nephew)          Description of Support System: Supportive, Involved    Support Assessment: Adequate family and caregiver support    Current Resources:   Patient receiving home care services:       Community Resources:    Equipment currently used at home:    Supplies currently used at home:      Employment/Financial:  Employment Status: retired        Financial Concerns:   None all bills go to nephew      Lifestyle & Psychosocial Needs:  Social Determinants of Health     Tobacco Use: Low Risk      Smoking Tobacco Use: Never     Smokeless Tobacco Use: Never     Passive Exposure: Not on file   Alcohol Use: Not on file   Financial Resource Strain: Not on file   Food Insecurity: Not on file   Transportation Needs: Not on file   Physical Activity: Not on file   Stress: Not on file   Social Connections: Not on file   Intimate Partner Violence: Not on file   Depression: Not on file   Housing Stability: Not on file       Functional Status:  Prior to admission patient needed  assistance:   Dependent ADLs:: Ambulation-cane, Bathing, Dressing, Eating, Grooming, Incontinence  Dependent IADLs:: Cleaning, Cooking, Laundry, Shopping, Meal Preparation, Medication Management, Money Management, Transportation       Mental Health Status:  Mental Health Status: No Current Concerns       Chemical Dependency Status:  Chemical Dependency Status: No Current Concerns             Values/Beliefs:  Spiritual, Cultural Beliefs, [employer] Practices, Values that affect care:                 Additional Information:    Patient is alert and oriented to self , disoriented to place, time and situation.    Spoke with ximena Dulce, she reports that patient is confused, she remembers her and her  some of the time. Has been at [facility] for a while. Discussed that plan that provider is discharging patient back to [facility] today and ride is set up for 730. I'm waiting for the DON from [facility], Ondina to call me back.    Stretcher ride arranged due to confusion and agitation.      Reviewed the [facility] letter with alylencho.    Addendum:[facility] unable to take patient back tonight but they can take him tomorrow in the morning.    Stretcher ride arranged for 10am 11/5/22. PCS form completed and placed in chart. Updated the charge nurse and provider. Spoke with Ondina at the [facility] and updated her on discharge time tomorrow.    JUAN ANTONIO Morel   Inpatient Care Coordination   Supervisor  [facility]  [phone]    JUAN ANTONIO [place]

## 2022-11-04 NOTE — DISCHARGE SUMMARY
[facility]  Discharge Summary        Jewels Cortez MRN# [id number]   YOB: 1935 Age: 87 year old     Date of Admission:  11/3/2022  Date of Discharge:  11/5/2022 10:25 AM  Admitting Physician:  Manjeet Jose MD  Discharge Physician: Mercy Rdz PA-C  Discharging Service: Hospitalist     Primary Provider: Holden Block  Primary Care Physician Phone Number: [phone]         Discharge Diagnoses/Problem Oriented Hospital Course (Providers):    Jewels Cortez was admitted on 11/3/2022 by Manjeet Jose MD and I would refer you to their history and physical.  The following problems were addressed during her hospitalization:    1. Reported encephalopathy in the setting of known dementia.   2. Negative work up for acute infectious or metabolic encephalopathy           Code Status:      Full Code        Brief Hospital Stay Summary Sent Home With Patient in AVS:        Reason for your hospital stay      You were admitted for concerns of generalized weakness and increased   confusion. Your work up here did not reveal any active infection with   normal labs, UA, CXR and CT of the head. You don't have any focal deficits   to suggest a acute stroke. Your electrolyte and kidney functions are all   within normal limits. You are not completely back to baseline but we ruled   out all nefarious causes. Recommend PT upon discharge to                   Important Results:      Recent Labs   Lab 11/06/22  0745 11/04/22  0542 11/03/22  1340   WBC 15.9* 6.7 8.5   HGB 9.3* 8.6* 9.7*   HCT 31.0* 29.7* 32.9*   MCV 86 87 86    346 406     Recent Labs   Lab 11/06/22  0745 11/04/22  0542 11/03/22  1340    141 140   POTASSIUM 3.5 4.2 4.3   CHLORIDE 99 105 103   CO2 26 26 25   ANIONGAP 11 10 12   * 85 95   BUN 13.2 11.8 13.8   CR 0.45* 0.50* 0.47*   GFRESTIMATED >90 90 >90   MALICK 8.8 8.4* 8.7*     7-Day Micro Results     Collected Updated Procedure Result Status      11/06/2022  0953 11/06/2022 1035 Asymptomatic COVID-19 Virus (Coronavirus) by PCR Nasopharyngeal [[id number]]    Swab from Nasopharyngeal    Final result Component Value   SARS CoV2 PCR Negative   NEGATIVE: SARS-CoV-2 (COVID-19) RNA not detected, presumed negative.            11/03/2022 1607 11/03/2022 1734 Symptomatic; Unknown Influenza A/B & SARS-CoV2 (COVID-19) Virus PCR Multiplex Nasopharyngeal [[id number]]    Swab from Nasopharyngeal    Final result Component Value   Influenza A PCR Negative   Influenza B PCR Negative   RSV PCR Negative   SARS CoV2 PCR Negative   NEGATIVE: SARS-CoV-2 (COVID-19) RNA not detected, presumed negative.            11/03/2022 1429 11/06/2022 1129 Urine Culture [[id number]]   Urine, Catheter    Final result Component Value   Culture No Growth                            Pending Results:        Unresulted Labs Ordered in the Past 30 Days of this Admission     Date and Time Order Name Status Description    11/4/2022 12:04 PM Urine Culture In process             Discharge Instructions and Follow-Up:      Follow-up Appointments     Follow-up and recommended labs and tests       Follow up with primary care provider, Holden Block, within 7   days for hospital follow- up.  No follow up labs or test are needed.               Discharge Disposition:      Discharged to home         Discharge Medications:        Current Discharge Medication List      START taking these medications    Details   senna-docusate (SENOKOT-S/PERICOLACE) 8.6-50 MG tablet Take 1 tablet by mouth 2 times daily as needed for constipation  Qty: 30 tablet, Refills: 0    Associated Diagnoses: Generalized weakness; Confusion         CONTINUE these medications which have NOT CHANGED    Details   acetaminophen (TYLENOL) 32 mg/mL liquid Take 650 mg by mouth every 4 hours as needed for fever or mild pain      risperiDONE (RISPERDAL) 1 MG/ML solution 0.25 mg Bid and bid prn  Qty: 30 mL, Refills: 11    Associated Diagnoses:  Dementia with behavioral disturbance, unspecified dementia type      trolamine salicylate (ASPERCREME) 10 % external cream Apply 1 g topically 2 times daily         STOP taking these medications       ciprofloxacin (CIPRO) 250 MG tablet Comments:   Reason for Stopping:                 Allergies:         Allergies   Allergen Reactions     Penicillins Unknown     Sulfa Drugs Unknown           Consultations This Hospital Stay:      No consultations were requested during this admission         Condition and Physical on Discharge:      Discharge condition: Stable   Vitals: Blood pressure 122/45, pulse 70, temperature 98  F (36.7  C), temperature source Axillary, resp. rate 18, height 1.524 m (5'), weight 47.5 kg (104 lb 11.2 oz), SpO2 94 %.  104 lbs 11.2 oz      GENERAL:  Comfortable.  PSYCH: pleasant, oriented, No acute distress.  HEENT:  PERRLA. Normal conjunctiva, normal hearing, nasal mucosa and Oropharynx are normal.  NECK:  Supple, no neck vein distention, adenopathy or bruits, normal thyroid.  HEART:  Normal S1, S2 with no murmur, no pericardial rub, gallops or S3 or S4.  LUNGS:  Clear to auscultation, normal Respiratory effort. No wheezing, rales or ronchi.  ABDOMEN:  Soft, no hepatosplenomegaly, normal bowel sounds. Non-tender, non distended.   EXTREMITIES:  No pedal edema, +2 pulses bilateral and equal.  SKIN:  Dry to touch, No rash, wound or ulcerations.  NEUROLOGIC:  CN 2-12 intact, BL 5/5 symmetric upper and lower extremity strength, sensation is intact with no focal deficits.         Discharge Time:      <30 mins        Image Results From This Hospital Stay (For Non-EPIC Providers):        Results for orders placed or performed during the hospital encounter of 11/03/22   XR Chest 2 Views    Narrative    XR CHEST 2 VIEWS   11/3/2022 3:43 PM     HISTORY: altered mental status, infectious work-up    COMPARISON: Chest radiograph 12/13/2021      Impression    IMPRESSION: Stable cardiomediastinal silhouette. No  focal airspace  consolidation, pleural effusion or pneumothorax. Significant sternal  irregularity on the lateral view, favor sequela of prior trauma but  recommend correlation with point tenderness.    SAMEER MOJICA MD         SYSTEM ID:  [id number]   CT Head w/o Contrast    Narrative    CT SCAN OF THE HEAD WITHOUT CONTRAST   11/3/2022 3:06 PM     HISTORY: Altered mental status    TECHNIQUE:  Axial images of the head and coronal reformations without  IV contrast material. Radiation dose for this scan was reduced using  automated exposure control, adjustment of the mA and/or kV according  to patient size, or iterative reconstruction technique.    COMPARISON: Head CT 12/13/2021    FINDINGS:  Artifact coursing through the central volume of view, presumably  related to suboptimal patient positioning. No evidence of hemorrhage.  Volume loss and white matter hypoattenuation likely representing  chronic small vessel ischemic change. No acute osseous abnormality.      Impression    IMPRESSION:   No acute intracranial abnormality identified.    HENNA STEINER MD         SYSTEM ID:  [id number]

## 2022-11-04 NOTE — PLAN OF CARE
PRIMARY DIAGNOSIS: GENERALIZED WEAKNESS  OUTPATIENT/OBSERVATION GOALS TO BE MET BEFORE DISCHARGE:  1. ADLs back to baseline: No    2. Activity and level of assistance: Up w/ Ax2 gbw    3. Pain status: Pain free.    4. Return to near baseline physical activity: No     Discharge Planner Nurse   Safe discharge environment identified: No  Barriers to discharge: Yes       Entered by: Mahogany Colby RN 11/04/2022   A&O to self only. Arouses to touch. Pt [place]. Pt anxious but redirectable. VSS. Tolerating RA. Up w/ Ax2 gbw. Pt has not been oob this shift. Has purewick in place. No redness or breakdown noted at this time. Pt has posey for bed alarm. Edema present bilaterally to LE. Pt does not appear to be in pain in accordance to the PAINAD scale. Increased visualization of pt/regular safety checks implemented. Lighting adjusted per pt and door open. Will cont to provide supportive cares.   Please review provider order for any additional goals.   Nurse to notify provider when observation goals have been met and patient is ready for discharge.

## 2022-11-04 NOTE — PLAN OF CARE
PRIMARY DIAGNOSIS: GENERALIZED WEAKNESS    OUTPATIENT/OBSERVATION GOALS TO BE MET BEFORE DISCHARGE  1. Orthostatic performed: N/A    2. Tolerating PO medications: N/A no meds    3. Return to near baseline physical activity: No    4. Cleared for discharge by consultants (if involved): No    Discharge Planner Nurse   Safe discharge environment identified: No  Barriers to discharge: Yes       Entered by: Katie Pearson RN 11/04/2022     A/O x1, self only. [place], hearing aids provides minimal improvement. VSS on RA. Assist of 2 w/ GBW. Tolerating soft diet, total feed. Ate 75% breakfast. Lung sounds clear. Bowel sounds active.  KENIA LBM. Adequate urine output via purewick. Incontinence care provided. Skin dry, flaky. Denies pain, nonverbal signs of pain not present.   Please review provider order for any additional goals.   Nurse to notify provider when observation goals have been met and patient is ready for discharge.

## 2022-11-04 NOTE — PROGRESS NOTES
[facility]  Hospitalist Progress Note  Mercy Rdz PA-C 11/04/2022    Reason for Stay (Diagnosis): increased weakness and agitation          Assessment and Plan:      Jewels Cortez is a 87 year old female admitted on 11/3/2022.      87-year-old female with history of dementia who lives in [facility] presents with 2 to 3-day history of increasing confusion and decreased ambulation and responsiveness.  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  She was started on ciprofloxacin on on 10/31 for suspected UTI as she was found to have positive leukocyte esterase but negative nitrite and leukocyte esterase.      1. Encephalopathy in setting of dementia    Unclear if there is an acute exacerbating factor or it is just worsening of her underlying dementia.  I think it is just worsening of her underlying dementia which might have been worsened by recent start of ciprofloxacin.    No major metabolic abnormalities on CMP.  CBC and VBG unremarkable and ammonia was normal at 26.  UA showed only 6 WBCs with negative nitrate and leukocyte esterase.  CT head without acute changes but showed volume loss and chronic small vessel ischemic changes.  Chest x-ray without acute abnormalities.    Pt was better this am MS wise but getting more agitated as the day went on. She was still weak but improved from admission and is an assist of 1.     Ok for discharge today but [facility] not able to take till tomorrow. Will discharge in am.      2. Abnormal UA.    Patient is already taking ciprofloxacin for 3 days, and absence of other UTI symptoms will not continue antibiotics    Urine cx this admission shows mixed urogenital sudha, no abx indicated     3. Anemia    Hemoglobin lower at 8.9 from 9.7, baseline about 11    Iron studies normal, no hx of GIB    No indication for transfusion    Cont to monitor for now, d/w family for  consideration of outpt c-scope based on GOC         Diet:  Soft diet  DVT Prophylaxis: Pneumatic Compression Devices  Todd Catheter: Not present  Central Lines: None  Cardiac Monitoring: None  Code Status:  Full code as per the RN at her memory care Ms. Graf.          Interval History (Subjective):      No new issue. Ate her bkfst today w/o problem. She is pleasant this am but getting more agitated as the day goes on.                   Physical Exam:      Last Vital Signs:  /45 (BP Location: Left arm)   Pulse 70   Temp 98  F (36.7  C) (Axillary)   Resp 18   Ht 1.524 m (5')   Wt 47.5 kg (104 lb 11.2 oz)   SpO2 94%   BMI 20.45 kg/m        Constitutional: Awake, alert, extremely [place], kyphotic   Respiratory: Clear to auscultation bilaterally, no crackles or wheezing   Cardiovascular: Regular rate and rhythm, normal S1 and S2, and no murmur noted   Abdomen: Normal bowel sounds, soft, non-distended, non-tender   Skin: No rashes, no cyanosis, dry to touch   Neuro: Alert and oriented x3, no weakness, numbness, memory loss   Extremities: No edema, normal range of motion   Other(s):        All other systems: Negative          Medications:      All current medications were reviewed with changes reflected in problem list.         Data:      All new lab and imaging data was reviewed.   Labs:      Lab Results   Component Value Date     11/04/2022     11/03/2022     12/13/2021    Lab Results   Component Value Date    CHLORIDE 105 11/04/2022    CHLORIDE 103 11/03/2022    CHLORIDE 110 12/13/2021    Lab Results   Component Value Date    BUN 11.8 11/04/2022    BUN 13.8 11/03/2022    BUN 19 12/13/2021      Lab Results   Component Value Date    POTASSIUM 4.2 11/04/2022    POTASSIUM 4.3 11/03/2022    POTASSIUM 3.5 12/13/2021    Lab Results   Component Value Date    CO2 26 11/04/2022    CO2 25 11/03/2022    CO2 25 12/13/2021    Lab Results   Component Value Date    CR 0.50 11/04/2022    CR 0.47 11/03/2022     CR 0.54 12/13/2021        Recent Labs   Lab 11/04/22  0542 11/03/22  1340   WBC 6.7 8.5   HGB 8.6* 9.7*   HCT 29.7* 32.9*   MCV 87 86    406       Recent Labs   Lab 11/04/22  0542 11/03/22  1340   GLC 85 95     Recent Labs   Lab 11/04/22  0542 11/03/22  1340   HGB 8.6* 9.7*       Recent Labs   Lab 11/03/22  1340   TSH 1.33     Recent Labs   Lab 11/03/22  1429   COLOR Yellow   APPEARANCE Slightly Cloudy*   URINEGLC Negative   URINEBILI Negative   URINEKETONE Negative   SG 1.026   UBLD Negative   URINEPH 5.0   PROTEIN 30*   NITRITE Negative   LEUKEST Negative   RBCU 8*   WBCU 6*      Imaging:   Results for orders placed or performed during the hospital encounter of 11/03/22   XR Chest 2 Views    Narrative    XR CHEST 2 VIEWS   11/3/2022 3:43 PM     HISTORY: altered mental status, infectious work-up    COMPARISON: Chest radiograph 12/13/2021      Impression    IMPRESSION: Stable cardiomediastinal silhouette. No focal airspace  consolidation, pleural effusion or pneumothorax. Significant sternal  irregularity on the lateral view, favor sequela of prior trauma but  recommend correlation with point tenderness.    SAMEER MOJICA MD         SYSTEM ID:  [id number]   CT Head w/o Contrast    Narrative    CT SCAN OF THE HEAD WITHOUT CONTRAST   11/3/2022 3:06 PM     HISTORY: Altered mental status    TECHNIQUE:  Axial images of the head and coronal reformations without  IV contrast material. Radiation dose for this scan was reduced using  automated exposure control, adjustment of the mA and/or kV according  to patient size, or iterative reconstruction technique.    COMPARISON: Head CT 12/13/2021    FINDINGS:  Artifact coursing through the central volume of view, presumably  related to suboptimal patient positioning. No evidence of hemorrhage.  Volume loss and white matter hypoattenuation likely representing  chronic small vessel ischemic change. No acute osseous abnormality.      Impression    IMPRESSION:   No acute  intracranial abnormality identified.    HENNA STEINER MD         SYSTEM ID:  [id number]

## 2022-11-04 NOTE — PLAN OF CARE
ROOM # 226    Living Situation (if not independent, order SW consult):  Facility name:  : [facility], pt lives at [facility]    Activity level at baseline: unsure, pt unable to say  Activity level on admit: Ax2     Who will be transporting you at discharge: unsure at this time, pt unable to say    Patient registered to observation; given Patient Bill of Rights; given the opportunity to ask questions about observation status and their plan of care.  Patient has been oriented to the observation room, bathroom and call light is in place.    Discussed discharge goals and expectations with patient/family.

## 2022-11-04 NOTE — PLAN OF CARE
PRIMARY DIAGNOSIS: GENERALIZED WEAKNESS    OUTPATIENT/OBSERVATION GOALS TO BE MET BEFORE DISCHARGE  1. Orthostatic performed: N/A    2. Tolerating PO medications: N/A no meds    3. Return to near baseline physical activity: No    4. Cleared for discharge by consultants (if involved): No    Discharge Planner Nurse   Safe discharge environment identified: No  Barriers to discharge: Yes       Entered by: Katie Pearson RN 11/04/2022     A/O x1, self only. [place], hearing aids provides minimal improvement. VSS on RA. Assist of 2 w/ GBW. Tolerating soft diet, total feed. Ate 75% breakfast. Lung sounds clear. Bowel sounds active.  KENIA LBM. Adequate urine output via purewick. Incontinence care provided. Skin dry, flaky. Denies pain, nonverbal signs of pain not present. Increased agitation throughout day - refusal to go for walk. Also refusing vitals.    Please review provider order for any additional goals.   Nurse to notify provider when observation goals have been met and patient is ready for discharge.

## 2022-11-04 NOTE — PLAN OF CARE
PRIMARY DIAGNOSIS: GENERALIZED WEAKNESS  OUTPATIENT/OBSERVATION GOALS TO BE MET BEFORE DISCHARGE:  ADLs back to baseline: No    Activity and level of assistance: Up w/ Ax2 gbw    Pain status: Pain free.    Return to near baseline physical activity: No     Discharge Planner Nurse   Safe discharge environment identified: No  Barriers to discharge: Yes       Entered by: Mahogany Colby RN 11/04/2022   A&O to self only. [place]ouses to touch. Pt [place]. Pt anxious but redirectable. VSS besides hypertensive. Tolerating RA. Up w/ Ax2 gbw. Pt has not been oob this shift. Has purewick in place. No redness or breakdown noted at this time. Pt has posey on for bed alarm. Edema present bilaterally to LE. Pt does not appear to be in pain in accordance to the PAINAD scale. Increased visualization of pt/regular safety checks implemented. Lighting adjusted per pt and door open. Will cont to provide supportive cares.   Please review provider order for any additional goals.   Nurse to notify provider when observation goals have been met and patient is ready for discharge.

## 2022-11-04 NOTE — PLAN OF CARE
PRIMARY DIAGNOSIS: GENERALIZED WEAKNESS    OUTPATIENT/OBSERVATION GOALS TO BE MET BEFORE DISCHARGE  1. Orthostatic performed: N/A    2. Tolerating PO medications: N/A no meds    3. Return to near baseline physical activity: No    4. Cleared for discharge by consultants (if involved): No    Discharge Planner Nurse   Safe discharge environment identified: No  Barriers to discharge: Yes       Entered by: Katie Pearson RN 11/04/2022     A/O x1, self only. [place], hearing aids provides minimal improvement. VSS on RA. Assist of 2 w/ GBW. Tolerating soft diet, total feed. Lung sounds clear. Bowel sounds active.  KENIA LBM. Adequate urine output via purewick. Skin dry, flaky. Denies pain, nonverbal signs of pain not present. Denies nausea.     Please review provider order for any additional goals.   Nurse to notify provider when observation goals have been met and patient is ready for discharge.

## 2022-11-05 NOTE — PLAN OF CARE
PRIMARY DIAGNOSIS: GENERALIZED WEAKNESS    OUTPATIENT/OBSERVATION GOALS TO BE MET BEFORE DISCHARGE  1. Orthostatic performed: N/A    2. Tolerating PO medications: No    3. Return to near baseline physical activity: No    4. Cleared for discharge by consultants (if involved): No    Discharge Planner Nurse   Safe discharge environment identified: Yes  Barriers to discharge: Yes       Entered by: Giovanny Mcdaniel RN 11/05/2022 7:18 AM    BP (!) 150/70   Pulse 73   Temp 98  F (36.7  C) (Axillary)   Resp 20   Ht 1.524 m (5')   Wt 47.5 kg (104 lb 11.2 oz)   SpO2 98%   BMI 20.45 kg/m    Pt is alert to self with confusion. Pt denies pain or discomfort. VSS. Pt tries to get out of bed. Pt reoriented. Pt is on video monitoring. Pt is incontinent of bowel and bladder. Pt is total feed. Pt is assist of two in transfer and cares. Bed alarm in place and call light within reach. Will continue to monitor and assess pt.     Please review provider order for any additional goals.   Nurse to notify provider when observation goals have been met and patient is ready for discharge.

## 2022-11-05 NOTE — PLAN OF CARE
PRIMARY DIAGNOSIS: GENERALIZED WEAKNESS    OUTPATIENT/OBSERVATION GOALS TO BE MET BEFORE DISCHARGE  1. Orthostatic performed: N/A    2. Tolerating PO medications: No    3. Return to near baseline physical activity: No - pt up short distance with assist of 1, longer distance with 2 and walker. Pt needing cuing     4. Cleared for discharge by consultants (if involved): No    Discharge Planner Nurse   Safe discharge environment identified: Yes  Barriers to discharge: Yes       Entered by: Yasir Mary RN 11/05/2022 9:50 AM    /59   Pulse 66   Temp 97.7  F (36.5  C) (Oral)   Resp 18   Ht 1.524 m (5')   Wt 47.5 kg (104 lb 11.2 oz)   SpO2 96%   BMI 20.45 kg/m    Pt is alert to self with confusion. Pt very [place], bilateral hearing aids in. Writing on paper used to communicated. Pt verbalized understanding she is going home at 1000. Eating breakfast. Pt concerned about hearing aids not working.   Please review provider order for any additional goals.   Nurse to notify provider when observation goals have been met and patient is ready for discharge.

## 2022-11-05 NOTE — PROGRESS NOTES
Care Management Discharge Note    Discharge Date: 11/05/2022     Discharge Disposition: Other (Comments) (back to her memory care)    Discharge Services:   with home PT     Discharge Transportation: agency    Private pay costs discussed: transportation costs    Education Provided on the Discharge Plan: Yes     Additional Information:  Pt discharging back to  today with home PT. HC PT arranged through [facility], ximena agreeable to this. Updated niece on ride time of stretcher transport at 10am.     Called [facility] and [facility] [phone] and [facility]. Sent fax to [phone] with orders. Updated bedside RN.     Dora Niño RN BSN   Inpatient Care Coordination  [facility]   Phone [phone]

## 2022-11-05 NOTE — PROGRESS NOTES
Patient's After Visit Summary was sent with pt to memory care. Pt unable to understand instructions due to dementia  Discharge medications sent home with patient/family: Not applicable   [facility]    OBSERVATION patient END time: 0576

## 2022-11-05 NOTE — PLAN OF CARE
PRIMARY DIAGNOSIS: GENERALIZED WEAKNESS    OUTPATIENT/OBSERVATION GOALS TO BE MET BEFORE DISCHARGE  1. Orthostatic performed: N/A    2. Tolerating PO medications: No refusing medication    3. Return to near baseline physical activity: No    4. Cleared for discharge by consultants (if involved): No    Discharge Planner Nurse   Safe discharge environment identified: Yes  [facility]  Barriers to discharge: Yes       Entered by: Giovanny Mcdaniel RN 11/04/2022 10:02 PM    BP (!) 150/70   Pulse 73   Temp 98  F (36.7  C) (Axillary)   Resp 20   Ht 1.524 m (5')   Wt 47.5 kg (104 lb 11.2 oz)   SpO2 98%   BMI 20.45 kg/m    Pt is alert to self with confusion. Pt denies pain or discomfort. VSS. Pt tries to get out of bed. Pt reoriented. Pt is on video monitoring. Pt is incontinent of bowel and bladder. Pt is total feed. Pt is assist of two in transfer and cares. Pt is hard of hearing and wears hearing aids. Bed alarm in place and call light within reach. Will continue to monitor and assess pt.    Please review provider order for any additional goals.   Nurse to notify provider when observation goals have been met and patient is ready for discharge.

## 2022-11-05 NOTE — PLAN OF CARE
PRIMARY DIAGNOSIS: GENERALIZED WEAKNESS    OUTPATIENT/OBSERVATION GOALS TO BE MET BEFORE DISCHARGE  1. Orthostatic performed: N/A    2. Tolerating PO medications: No    3. Return to near baseline physical activity: No    4. Cleared for discharge by consultants (if involved): No    Discharge Planner Nurse   Safe discharge environment identified: Yes  Barriers to discharge: Yes       Entered by: Giovanny Mcdaniel RN 11/05/2022 2:28 AM  BP (!) 150/70   Pulse 73   Temp 98  F (36.7  C) (Axillary)   Resp 20   Ht 1.524 m (5')   Wt 47.5 kg (104 lb 11.2 oz)   SpO2 98%   BMI 20.45 kg/m        Please review provider order for any additional goals.   Nurse to notify provider when observation goals have been met and patient is ready for discharge.

## 2022-11-05 NOTE — DISCHARGE INSTRUCTIONS
Your home care referral was sent to [facility]  If you haven't heard from them within the next 24-48 hours,  Please call them at [phone]

## 2022-11-06 PROBLEM — S72.142A DISPLACED INTERTROCHANTERIC FRACTURE OF LEFT FEMUR, INITIAL ENCOUNTER FOR CLOSED FRACTURE (H): Status: ACTIVE | Noted: 2022-01-01

## 2022-11-06 NOTE — TREATMENT PLAN
TREATMENT PLAN    88 yo F with advanced dementia who fell at NH yesterday and was assisted back to bed.  This AM, day shift noticed RLE deformity.  Patient brought in for evaluation where she was found to have a left intertrochanteric hip fracture.  Not on blood thinners.     Discussed with Dr اعللي and Dr French.  Plan for ORIF today if OR has availability.  Leave NPO for now.  NWB LLE.  Pain control.  Type and cross.  Vit D.  UA (leukocytosis and left shift).    Pre-op orders saved - will need to be signed once case request is visible in chart.     NIXONohmarcial [facility]

## 2022-11-06 NOTE — TELEPHONE ENCOUNTER
Jewels Cortez is a 87 year old  (1935), Nurse called today to report:   Call received that patient was sent to the hospital related to a fall.  Patient was already sent before writer received a call     Electronically signed by:   Mirela Sue CNP

## 2022-11-06 NOTE — ED NOTES
Care Management Initial Consult    General Information  Assessment completed with: VM-chart review, Chart review       Primary Care Provider verified and updated as needed: No   Readmission within the last 30 days:           Advance Care Planning: Advance Care Planning Reviewed: present on chart          Communication Assessment  Patient's communication style: spoken language (English or Bilingual)             Cognitive  Cognitive/Neuro/Behavioral:                        Living Environment:   People in home: facility resident     Current living Arrangements: extended care facility  Name of Facility: [facility]. ([phone]  Send fax to [phone])   Able to return to prior arrangements:         Family/Social Support:  Care provided by: homecare agency, other (see comments) (faciltiy staff)  Provides care for:    Marital Status:    (family, niece)          Description of Support System: Supportive, Involved         Current Resources:   Patient receiving home care services: Yes  Skilled Home Care Services: [facility] ([facility]- sent email to verify)  Community Resources: Housekeeping/Chore Agency  Equipment currently used at home:    Supplies currently used at home:      Employment/Financial:  Employment Status:     retired     Financial Concerns: No concerns identified. All bills go to nephew.   Referral to Financial Worker: No       Lifestyle & Psychosocial Needs:  Social Determinants of Health     Tobacco Use: Low Risk      Smoking Tobacco Use: Never     Smokeless Tobacco Use: Never     Passive Exposure: Not on file   Alcohol Use: Not on file   Financial Resource Strain: Not on file   Food Insecurity: Not on file   Transportation Needs: Not on file   Physical Activity: Not on file   Stress: Not on file   Social Connections: Not on file   Intimate Partner Violence: Not on file   Depression: Not on file   Housing Stability: Not on file     Functional Status:  Prior to admission patient needed  assistance:   Dependent ADLs:: Ambulation-cane, Bathing, Dressing, Eating, Grooming, Incontinence  Dependent IADLs:: Cleaning, Cooking, Laundry, Shopping, Meal Preparation, Medication Management, Money Management, Transportation    Mental Health Status:  Mental Health Status: No Current Concerns       Chemical Dependency Status:  Chemical Dependency Status: No Current Concerns             Additional Information:  Patient was admitted 11/3/22-11/5/22 where she discharged back to [facility] P: [phone] and LM. Send fax to [phone]. Patient discharged with [facility] home care PT. SW sent e-mail to verify and update of admission status.     Assessment was completed via chart review only.See SW assessment from last admission on 11/4/22    Patient has dementia. Patient took a stretcher back to her facility during last admission.     SW updated [facility]. They added extra safety checks after she returned yesterday. SW will continue to follow.     REANNA Parsons, [facility]  Emergency Room   [phone]-Please contact the SW on the floor in which the patient is staying for any questions or concerns

## 2022-11-06 NOTE — PLAN OF CARE
Pt here in PACU for pre-op. Pt. Alert to name. Know history of memory loss. Consent signed via phone with nephewEb POA and Health Care Representative.This RN and Dr. SAVANNAH French MD served as witnesses. Documents obtained to support this consent. Supporting Documents will be submitted to [facility].  Lidocaine Neb. Given pre-op per Dr. Moises MD and will be noted in Anesthesia Documentation. Mepalex given to OR to place on coccyx area per protocol.Pt. pleasant and calm. Tribal. Rt. Ear preferred for conversation. Pt. Incontinent. (OR aware.)

## 2022-11-06 NOTE — ED NOTES
Bed: [facility]  Expected date:   Expected time:   Means of arrival:   Comments:    87  Knee injury

## 2022-11-06 NOTE — PHARMACY-ADMISSION MEDICATION HISTORY
Admission medication history interview status for this patient is complete. See [facility] admission navigator for allergy information, prior to admission medications and immunization status.     Medication history interview done, indicate source(s): NA  Medication history resources (including written lists, pill bottles, clinic record): [facility]  Pharmacy: [address]    Changes made to PTA medication list:  Added: None  Changed: None  Reported as Not Taking: None  Removed: None    Actions taken by pharmacist (provider contacted, etc):None     Additional medication history information: Senna-S; new rx from 11/4 discharge, not on facility MAR yet    Medication reconciliation/reorder completed by provider prior to medication history?  N   (Y/N)     Prior to Admission medications    Medication Sig Last Dose Taking? Auth Provider Long Term End Date   acetaminophen (TYLENOL) 32 mg/mL liquid Take 650 mg by mouth every 4 hours as needed for fever or mild pain  at PRN Yes Unknown, Entered By History     risperiDONE (RISPERDAL) 1 MG/ML solution 0.25 mg Bid and bid prn 11/6/2022 Yes Holden Block, APRN CNP Yes    trolamine salicylate (ASPERCREME) 10 % external cream Apply 1 g topically 2 times daily 11/6/2022 Yes Reported, Patient     senna-docusate (SENOKOT-S/PERICOLACE) 8.6-50 MG tablet Take 1 tablet by mouth 2 times daily as needed for constipation  at new rx from 11/4 discharge, not on facility MAR yet  [facility] Rdz PA-C

## 2022-11-06 NOTE — CONSULTS
Care Management Follow Up    Length of Stay (days): 0    Expected Discharge Date: 11/12/2022     Concerns to be Addressed:       Patient plan of care discussed at interdisciplinary rounds: No    Anticipated Discharge Disposition: Home Care     Anticipated Discharge Services:    Anticipated Discharge DME:      Patient/family educated on Medicare website which has current facility and service quality ratings:    Education Provided on the Discharge Plan:    Patient/Family in Agreement with the Plan: unable to assess    Referrals Placed by CM/SW:    Private pay costs discussed: Not applicable    Additional Information:  See ED note for SW consult.     REANNA Parsons, [facility]  Emergency Room   [phone]-Please contact the SW on the floor in which the patient is staying for any questions or concerns

## 2022-11-06 NOTE — ED TRIAGE NOTES
Fell last night at nursing home.  Staff assisted pt back into bed after fall.  This am, staff noticed Rt leg injury and pt appeared to be in pain.  Rt leg shortened and rotated.  Given 50 mcg fentanyl by PIV.  Pt disoriented at baseline.

## 2022-11-06 NOTE — ANESTHESIA PREPROCEDURE EVALUATION
Anesthesia Pre-Procedure Evaluation    Patient: Jewels Cortez   MRN: [id number] : 1935        Procedure : Procedure(s):  INTERNAL FIXATION, FRACTURE, RIGHT TROCHANTERIC, HIP, USING PINS OR RODS          Past Medical History:   Diagnosis Date     Acquired postural kyphosis 2020     Bilateral sensorineural hearing loss wears hearing aides 2020     Cancer (H)     skin cancer forehead and earlobe     Cataract     bilateral with lens implants     Dementia associated with other underlying disease with behavioral disturbance 2020     Memory loss      Onychomycosis due to dermatophyte 2020     Right leg swelling 2020      Past Surgical History:   Procedure Laterality Date     BLADDER LIFT SURGERY       CATARACT IOL, RT/LT Bilateral     surgery done in [place]     CERVICAL NECK SURGERY       HYSTERECTOMY       LUMBAR BACK SURGERY       skin cancer      removal of earlobe and face      Allergies   Allergen Reactions     Penicillins Unknown     Sulfa Drugs Unknown      Social History     Tobacco Use     Smoking status: Never     Smokeless tobacco: Never   Substance Use Topics     Alcohol use: Not Currently      Wt Readings from Last 1 Encounters:   22 47.5 kg (104 lb 11.2 oz)        Anesthesia Evaluation            ROS/MED HX  ENT/Pulmonary:  - neg pulmonary ROS     Neurologic:     (+) dementia,     Cardiovascular:  - neg cardiovascular ROS     METS/Exercise Tolerance:     Hematologic:  - neg hematologic  ROS     Musculoskeletal:   (+) fracture,     GI/Hepatic:  - neg GI/hepatic ROS     Renal/Genitourinary:  - neg Renal ROS     Endo:  - neg endo ROS     Psychiatric/Substance Use:  - neg psychiatric ROS     Infectious Disease:  - neg infectious disease ROS     Malignancy:  - neg malignancy ROS     Other: Comment: .Lab Test        22                       0745          0542          1340          WBC          15.9*        6.7          8.5            HGB          9.3*         8.6*         9.7*          MCV          86           87           86            PLT          384          346          406           INR           --           --          1.08           Lab Test        11/06/22 11/04/22 11/03/22                       0745          0542          1340          NA           136          141          140           POTASSIUM    3.5          4.2          4.3           CHLORIDE     99           105          103           CO2          26           26           25            BUN          13.2         11.8         13.8          CR           0.45*        0.50*        0.47*         ANIONGAP     11           10           12            MALICK          8.8          8.4*         8.7*          GLC          157*         85           95                       Physical Exam    Airway      Comment: Patient unable to cooperate with airway exam      Neck ROM: limited     Respiratory Devices and Support         Dental       (+) missing and chipped      Cardiovascular   cardiovascular exam normal       Rhythm and rate: regular     Pulmonary           (+) decreased breath sounds           OUTSIDE LABS:  CBC:   Lab Results   Component Value Date    WBC 15.9 (H) 11/06/2022    WBC 6.7 11/04/2022    HGB 9.3 (L) 11/06/2022    HGB 8.6 (L) 11/04/2022    HCT 31.0 (L) 11/06/2022    HCT 29.7 (L) 11/04/2022     11/06/2022     11/04/2022     BMP:   Lab Results   Component Value Date     11/06/2022     11/04/2022    POTASSIUM 3.5 11/06/2022    POTASSIUM 4.2 11/04/2022    CHLORIDE 99 11/06/2022    CHLORIDE 105 11/04/2022    CO2 26 11/06/2022    CO2 26 11/04/2022    BUN 13.2 11/06/2022    BUN 11.8 11/04/2022    CR 0.45 (L) 11/06/2022    CR 0.50 (L) 11/04/2022     (H) 11/06/2022    GLC 85 11/04/2022     COAGS:   Lab Results   Component Value Date    INR 1.08 11/03/2022     POC: No results found for: BGM, HCG, HCGS  HEPATIC:   Lab Results   Component Value  Date    ALBUMIN 4.0 11/03/2022    PROTTOTAL 7.0 11/03/2022    ALT 11 11/03/2022    AST 20 11/03/2022    ALKPHOS 109 (H) 11/03/2022    BILITOTAL 0.4 11/03/2022    DARCI 26 11/03/2022     OTHER:   Lab Results   Component Value Date    PH 7.45 (H) 11/03/2022    LACT 0.8 11/03/2022    MALICK 8.8 11/06/2022    MAG 2.1 11/03/2022    TSH 1.33 11/03/2022       Anesthesia Plan    ASA Status:  3      Anesthesia Type: General.     - Airway: ETT   Induction: Intravenous, Propofol.   Maintenance: Balanced.        Consents    Anesthesia Plan(s) and associated risks, benefits, and realistic alternatives discussed. Questions answered and patient/representative(s) expressed understanding.    - Discussed:     - Discussed with:  Patient         Postoperative Care    Pain management: IV analgesics, Oral pain medications, Multi-modal analgesia.   PONV prophylaxis: Ondansetron (or other 5HT-3), Dexamethasone or Solumedrol     Comments:                Tesfaye De Leon DO

## 2022-11-06 NOTE — ED PROVIDER NOTES
History   Chief Complaint:  Leg Injury       The history is limited by the condition of the patient.      Jewels Cortez is a 87 year old female with history of dementia, skin cancer and a-fib who presents with a fall and subsequent leg injury. Per nursing, the patient fell last night at her nursing home and was successfully put to bed, however, this morning her nurse noticed some deformity to her right knee. Patient seems to be putting her hand on her right hip upon examination.     Review of Systems   Unable to perform ROS: Dementia   Musculoskeletal: Positive for arthralgias (holding hand over right hip).     Allergies:  Penicillins  Sulfa Drugs    Medications:  risperiDONE (RISPERDAL) 1 MG/ML solution  senna-docusate (SENOKOT-S/PERICOLACE) 8.6-50 MG tablet    Past Medical History:     Bilateral hearing loss  Edema   Dementia   A-fib  Paranoia  Skin cancer   Kyphosis    Past Surgical History:    Bladder lift surgery  Cataract IOL  Cervical neck surgery  Hysterectomy   Lumbar surgery  Skin cancer related skin removal     Social History:  The patient presents to the ED alone via EMS.   PCP: Holden Block     Physical Exam     Patient Vitals for the past 24 hrs:   BP Temp Pulse Resp SpO2   11/06/22 1100 136/65 -- 79 -- --   11/06/22 1000 (!) 155/67 -- 78 26 97 %   11/06/22 0800 -- -- -- -- 98 %   11/06/22 0745 -- -- -- -- 98 %   11/06/22 0730 -- -- -- -- 98 %   11/06/22 0715 (!) 145/63 -- 74 -- 97 %   11/06/22 0700 -- -- -- -- 97 %   11/06/22 0653 (!) 150/74 96.9  F (36.1  C) 78 (!) 36 98 %       Physical Exam  Constitutional: Vital signs reviewed as above.   Head: No external signs of trauma noted.  Eyes: Pupils are equal, round, and reactive to light.   Neck: No JVD noted  Cardiovascular: normal rate, Regular rhythm and normal heart sounds.  No murmur heard. Equal B/L peripheral pulses.  Pulmonary/Chest: Effort normal and breath sounds normal. No respiratory distress. Patient has no wheezes. Patient  has no rales.   Gastrointestinal: Soft. There is no tenderness.   Musculoskeletal/Extremities: No pitting edema noted. RLE appears shortened and externally rotated. There is trochanteric tenderness. Mild discomfort over fingers 2 and 3 of the left hand.  Neurological: Patient is alert. She has dementia and does not answer questions. The patient is actively putting her right hand on the right hip.  Skin: Skin is warm and dry. There is no diaphoresis noted. Bruising on the dorsal left hand and fingers 2 & 3 of the left hand.  Psychiatric: The patient appears calm.    Emergency Department Course     Imaging:  XR Hand Left G/E 3 Views   Final Result   IMPRESSION: Interval healing of the fracture of the distal radius seen on the old study. This is healed with some mild residual posttraumatic deformity. There is severe degenerative change at the radioscaphoid articulation as well as the STT joint and    first CMC joint but no acute fractures are identified. Diffuse demineralization.         XR Pelvis w Hip Right 1 View   Final Result   IMPRESSION: Intertrochanteric fracture of the right hip. No dislocation. Left hip negative for fracture. Pelvis negative for fracture.        Report per radiology    Laboratory:  Labs Ordered and Resulted from Time of ED Arrival to Time of ED Departure   BASIC METABOLIC PANEL - Abnormal       Result Value    Sodium 136      Potassium 3.5      Chloride 99      Carbon Dioxide (CO2) 26      Anion Gap 11      Urea Nitrogen 13.2      Creatinine 0.45 (*)     Calcium 8.8      Glucose 157 (*)     GFR Estimate >90     CBC WITH PLATELETS AND DIFFERENTIAL - Abnormal    WBC Count 15.9 (*)     RBC Count 3.62 (*)     Hemoglobin 9.3 (*)     Hematocrit 31.0 (*)     MCV 86      MCH 25.7 (*)     MCHC 30.0 (*)     RDW 16.2 (*)     Platelet Count 384      % Neutrophils 88      % Lymphocytes 4      % Monocytes 7      % Eosinophils 0      % Basophils 0      % Immature Granulocytes 1      NRBCs per 100 WBC 0       "Absolute Neutrophils 13.9 (*)     Absolute Lymphocytes 0.6 (*)     Absolute Monocytes 1.2      Absolute Eosinophils 0.0      Absolute Basophils 0.0      Absolute Immature Granulocytes 0.1      Absolute NRBCs 0.0     COVID-19 VIRUS (CORONAVIRUS) BY PCR - Normal    SARS CoV2 PCR Negative     UA MACROSCOPIC WITH REFLEX TO MICRO AND CULTURE   25 HYDROXYVITAMIN D2 & D3   ABO/RH TYPE AND SCREEN      Emergency Department Course:    Reviewed:  I reviewed nursing notes, vitals, past medical history and Care Everywhere    Assessments:  ED Course as of 11/06/22 1126   Sun Nov 06, 2022   0703 I obtained history and examined the patient.    0856 I spoke to Dulce, the patient's niece, regarding the patient. She and her  are currently in [place] (driving home and probably will be back by Wednesday).   0912 D/W Dr. Terry.   0913 I spoke to Dr. Terry, hospitalist, who accepts the patient for admission.   0925 D/W PATRICIA Li with Ortho.   1126 D/W PATRICIA Li again. Pt may be added on today. Should remain. NPO       Interventions:  Medications   morphine (PF) injection 4 mg (4 mg Intravenous Not Given 11/6/22 0747)     Disposition:  The patient was admitted to the hospital under the care of Dr. Terry.     Impression & Plan     CMS Diagnoses: None    Medical Decision Making:  This 87-year-old female patient presents to the ED from her care facility due to right hip pain.  Please see the HPI and exam for specifics.  Nursing notes that EMS told them that the patient had apparently fallen out of bed last night but was helped back into bed.  Morning staff noted that the patient seemed uncomfortable and in pain and her \"knee\" looked different.  This is likely due to the shortened and externally rotated nature of the right lower extremity secondary to the intertrochanteric fracture found on x-ray today.    The patient's niece was updated and I then discussed the case with the hospital service and orthopedics. The " patient will be admitted for further care.    Diagnosis:    ICD-10-CM    1. Displaced intertrochanteric fracture of left femur, initial encounter for closed fracture (H)  S72.142A         Scribe Disclosure:  I, MAXIMILIANO DAVENPORT, am serving as a scribe at 7:05 AM on 11/6/2022 to document services personally performed by Nico العلي DO based on my observations and the provider's statements to me.          Nico العلي DO  11/06/22 1018       Nico العلي DO  11/06/22 1126

## 2022-11-06 NOTE — ANESTHESIA PROCEDURE NOTES
Airway       Patient location during procedure: OR       Procedure Start/Stop Times: 11/6/2022 2:43 PM  Staff -        CRNA: Marge Dixon APRN CRNA       Performed By: CRNA  Consent for Airway        Urgency: elective  Indications and Patient Condition       Indications for airway management: delilah-procedural         Mask difficulty assessment: 1 - vent by mask    Final Airway Details       Final airway type: endotracheal airway       Successful airway: ETT - single  Endotracheal Airway Details        ETT size (mm): 6.5       Cuffed: yes       Successful intubation technique: video laryngoscopy       VL Blade Size: Glidescope 3       Grade View of Cords: 1       Adjucts: stylet       Position: Center       Measured from: gums/teeth       Secured at (cm): 21       Bite block used: None    Post intubation assessment        Placement verified by: capnometry, equal breath sounds and chest rise        Number of attempts at approach: 1       Secured with: plastic tape       Ease of procedure: easy       Dentition: Intact and Unchanged    Medication(s) Administered   Medication Administration Time: 11/6/2022 2:43 PM

## 2022-11-06 NOTE — OP NOTE
[facility]  Orthopedic Operative Note    Long Cephallomedullary Nailing    Jewels Cortez MRN# [id number]   YOB: 1935  Procedure Date:  11/6/2022  Age: 87 year old     PREOPERATIVE DIAGNOSIS:  right intertrochanteric femur fracture .    POSTOPERATIVE DIAGNOSIS:  right intertrochanteric femur fracture .     PROCEDURE PERFORMED:  Surgical treatment of right intertrochanteric femur fracture with cephalomedullary device    SURGEON:  Dave French MD    FIRST ASSISTANT: Elbert Damico PA-C whose assistance was required for positioning, prep and drape, instrumentation, and closure.    ANESTHESIA:  General     EBL: 100 mL    COMPLICATIONS: None     DISPOSITION: Post Anesthesia Care Unit     CONDITION: Stable    INDICATIONS:  Jewels Cortez is a 87 year old female presenting with a right intertrochanteric femur fracture.  Discussed both operative and nonoperative management with her nephew who is her POA. Risks of surgery discussed included but not limited to bleeding, infection, damage to surrounding neurovascular structures, leg length inequality, nonunion, malunion, need for revision surgery, blood clots, pulmonary embolus, and the medical risks of anesthesia.  Benefits of surgery discussed included improved chance of union in acceptable alignment, improve function, and reduction in medical risks of hip fractures.  Alternatives discussed included nonoperative management which I did not recommend.  The patient's nephew acknowledges risks and wishes to proceed. The informed consent was signed and documented.  I met with the patient preoperatively and marked the operative extremity.      IMPLANTS:  Implant Name Type Inv. Item Serial No.  Lot No. LRB No. Used Action   NAIL GAMMA3 LNG R [id number] 125DEG 3425-1380S - [id number] Metallic Hardware/[employer] NAIL GAMMA3 LNG R [id number] 125DEG 3425-1380S  [employer] [id number] Right 1 Implanted   IMP SCR BONE STRK G3 LAG  10.5X95MM TI 3060-0095S - [id number] Metallic Hardware/[place] IMP SCR BONE STRK G3 LAG 10.5X95MM TI 3060-0095S  [employer] [id number] Right 1 Implanted   IMP SCR STRK LOCK 5.0X42.5MM FT 1896-5042S - [id number] Metallic Hardware/[place] IMP SCR STRK LOCK 5.0X42.5MM FT 1896-5042S  [employer] [id number] Right 1 Implanted   IMP SCR STRK LOCKING T2 F/T 5X47.5MM 1896-5047S - [id number] Metallic Hardware/[place] IMP SCR STRK LOCKING T2 F/T 5X47.5MM [id number]  [employer] [id number] Right 1 Implanted       PROCEDURE: The patient was brought to the OR and underwent general anesthesia.  The patient was subsequently transferred in a supine position to the fracture table.  The peroneal post was placed.  The perineum was engaged gently into the perineal post.  Both feet were placed in well-padded traction boots.  Patient placed in a scissored position with the fractured side and in-line traction and the well leg placed in approximately 30 degrees of hip extension.  Fluoroscopic unit was brought into the field and provisional reduction was achieved.  The right hip was prepped and draped in normal sterile fashion.  Antibiotic administration was confirmed and timeout was completed.  Following generalized agreement, the starting guidepin was advanced down to the start point on the greater trochanter.  We confirmed the start point on fluoroscopy and then advanced the guidewire into the femur.  The position of the guidewire was confirmed with fluoroscopy.  We then made an incision around the pin and gained access to the intramedullary nail using the entry reamer.  All instruments were removed.  The long ball-tipped guidewire was advanced down to the knee.  We measured for and selected the appropriate length nail.  We then sequentially reamed up to a size 12.5 mm reamer.  A Nato [id number] 125 degree x 380 cm x 11 mm nail was selected.  This was advanced to an appropriate depth over the guidewire confirmed by  fluoroscopy.  The ball-tipped guidewire was then removed.  We then advanced the guide for the lag screw down to bone through a small lateral thigh incision.  The reduction was adjusted a bit with a Maldonado elevator.  The guidewire was then advanced into the central position within the femoral neck and head confirmed by fluoroscopy.  We then measured for and selected an appropriate length lag screw.  We reamed for the lag screw and then placed a lag screw by hand.  The setscrew was advanced and tightened.  All instruments were then removed.  We confirmed placement of the hardware proximally using AP and lateral fluoroscopy.  When we were satisfied perfect [place] imaging was obtained distally.  We then drilled for and placed two statically interlocked distal interlocking screws achieving good bicortical purchase.  Final fluoroscopic imaging was obtained demonstrating good alignment of the fracture good positioning of all hardware.  We then thoroughly irrigated and closed in layers with 2-0 Vicryl, and staples.  The wounds were anesthetized with bupivacaine and sterile dressings were applied.  Patient was transported to the recovery room in stable condition, sustaining no complications. There were no complications and the patient tolerated well.    PLAN:    Activity: Weight-bear as tolerated, range of motion as tolerated, physical therapy and occupational therapy consults  Antibiotics: 24 hours IV antibiotics per standard postop protocol  DVT:  SCD's and chemical prophylaxis with Lovenox or per medicine given her frailty  Discharge:  Case management social work consult for placement  Follow-up:  Follow up at [facility] in 2 weeks with repeat x-rays AP and lateral right hip and femur    Dave French MD  [facility]

## 2022-11-06 NOTE — CONSULTS
[facility]    Orthopedics Consultation    Date of Admission:  11/6/2022    Assessment & Plan     1.  Right intertrochanteric femur fracture    Plan:    I recommended operative treatment of the patient's right intertrochanteric femur fracture.  Her nephew is her power of .  We discussed the natural history of this diagnosis, and the plan for surgery.  We discussed the risks and benefits of the procedure, as well as the expected post-operative course.  He agrees with the plan proceed.  His telephone consent was obtained and documented.  Postoperative she will be weightbearing as tolerated.      Principal Problem:    Displaced intertrochanteric fracture of left femur, initial encounter for closed fracture (H)    Dave French MD     Code Status    Full Code    Reason for Consult   Reason for consult: I was asked by Dr. العلي to evaluate this patient for a right hip fracture.    Primary Care Physician   Holden Block    Chief Complaint   Right intertrochanteric femur fracture    History is obtained from the patient    History of Present Illness   Jewels Cortez is a 87 year old female who presents with a right intertrochanteric femur fracture due to ground-level fall at her nursing home last night.  The patient has baseline dementia and does not communicate well.  She was found to be holding her hip and her nurse noted some deformity of her right hip during a transfer this morning.  It is unknown whether she has any antecedent pain and while she does not appear to have any other symptoms or complaints she does not communicate well due to her dementia.    Past Medical History   I have reviewed this patient's medical history and updated it with pertinent information if needed.   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  Past Surgical History:   Procedure Laterality Date     BLADDER LIFT SURGERY       CATARACT IOL, RT/LT Bilateral     surgery done in [place]     CERVICAL NECK SURGERY       HYSTERECTOMY       LUMBAR BACK SURGERY       skin cancer      removal of earlobe and face       Prior to Admission Medications   Prior to Admission Medications   Prescriptions Last Dose Informant Patient Reported? Taking?   acetaminophen (TYLENOL) 32 mg/mL liquid  at PRN  Yes Yes   Sig: Take 650 mg by mouth every 4 hours as needed for fever or mild pain   risperiDONE (RISPERDAL) 1 MG/ML solution 2022  No Yes   Si.25 mg Bid and bid prn   senna-docusate (SENOKOT-S/PERICOLACE) 8.6-50 MG tablet  at new rx from  discharge, not on facility MAR yet  No No   Sig: Take 1 tablet by mouth 2 times daily as needed for constipation   trolamine salicylate (ASPERCREME) 10 % external cream 2022  Yes Yes   Sig: Apply 1 g topically 2 times daily      Facility-Administered Medications: None     Allergies   Allergies   Allergen Reactions     Penicillins Unknown     Sulfa Drugs Unknown       Social History   I have reviewed this patient's social history and updated it with pertinent information if needed. Jewels Cortez  reports that she has never smoked. She has never used smokeless tobacco. She reports that she does not currently use alcohol. She reports that she does not use drugs.    Family History   I have reviewed this patient's family history and updated it with pertinent information if needed.   No family history on file.    Review of Systems   Review of systems not  obtained due to patient factors - confusion    Physical Exam   Temp: 99.3  F (37.4  C) Temp src: Temporal BP: (!) 163/68 Pulse: 78   Resp: 16 SpO2: 99 % O2 Device: None (Room air)    Vital Signs with Ranges  Temp:  [96.9  F (36.1  C)-99.3  F (37.4  C)] 99.3  F (37.4  C)  Pulse:  [74-85] 78  Resp:  [16-36] 16  BP: (136-163)/(47-74) 163/68  SpO2:  [95 %-99 %] 99 %  0 lbs 0 oz    Constitutional: awake, alert, cooperative, no apparent distress, and appears stated age, quite frail  Eyes: extra-ocular muscles intact, no scleral icterus  ENT: normocepalic, atraumatic without obvious abnormality  Hematologic / Lymphatic:  No lymphedema   Respiratory: no increased work of breathing, symmetric chest wall expansion  Skin: normal skin color, texture, turgor  Musculoskeletal: Holds her right lower extremity and external rotation, pain with any attempted movement of the right hip  Neurologic: Cranial Nerves:  cranial nerves II-XII are grossly intact  Motor Exam: No apparent motor deficits, exam extremely limited secondary to dementia  Sensory: No apparent sensory deficits exam extremely limited secondary to her dementia  Neuropsychiatric: General: normal, calm and normal eye contact  Level of consciousness: alert / normal  Affect: normal  Orientation: not oriented    Data   Results for orders placed or performed during the hospital encounter of 11/06/22 (from the past 24 hour(s))   CBC with platelets differential    Narrative    The following orders were created for panel order CBC with platelets differential.  Procedure                               Abnormality         Status                     ---------                               -----------         ------                     CBC with platelets and d...[[id number]]  Abnormal            Final result                 Please view results for these tests on the individual orders.   Basic metabolic panel   Result Value Ref Range    Sodium 136 136 - 145 mmol/L    Potassium 3.5 3.4  - 5.3 mmol/L    Chloride 99 98 - 107 mmol/L    Carbon Dioxide (CO2) 26 22 - 29 mmol/L    Anion Gap 11 7 - 15 mmol/L    Urea Nitrogen 13.2 8.0 - 23.0 mg/dL    Creatinine 0.45 (L) 0.51 - 0.95 mg/dL    Calcium 8.8 8.8 - 10.2 mg/dL    Glucose 157 (H) 70 - 99 mg/dL    GFR Estimate >90 >60 mL/min/1.73m2   CBC with platelets and differential   Result Value Ref Range    WBC Count 15.9 (H) 4.0 - 11.0 10e3/uL    RBC Count 3.62 (L) 3.80 - 5.20 10e6/uL    Hemoglobin 9.3 (L) 11.7 - 15.7 g/dL    Hematocrit 31.0 (L) 35.0 - 47.0 %    MCV 86 78 - 100 fL    MCH 25.7 (L) 26.5 - 33.0 pg    MCHC 30.0 (L) 31.5 - 36.5 g/dL    RDW 16.2 (H) 10.0 - 15.0 %    Platelet Count 384 150 - 450 10e3/uL    % Neutrophils 88 %    % Lymphocytes 4 %    % Monocytes 7 %    % Eosinophils 0 %    % Basophils 0 %    % Immature Granulocytes 1 %    NRBCs per 100 WBC 0 <1 /100    Absolute Neutrophils 13.9 (H) 1.6 - 8.3 10e3/uL    Absolute Lymphocytes 0.6 (L) 0.8 - 5.3 10e3/uL    Absolute Monocytes 1.2 0.0 - 1.3 10e3/uL    Absolute Eosinophils 0.0 0.0 - 0.7 10e3/uL    Absolute Basophils 0.0 0.0 - 0.2 10e3/uL    Absolute Immature Granulocytes 0.1 <=0.4 10e3/uL    Absolute NRBCs 0.0 10e3/uL   XR Pelvis w Hip Right 1 View    Narrative    EXAM: XR PELVIS AND HIP RIGHT 1 VIEW  LOCATION: [facility] [place]  DATE/TIME: 11/6/2022 8:37 AM    INDICATION: fall, right hip pain; RLE shortened and externally rotated  COMPARISON: None.      Impression    IMPRESSION: Intertrochanteric fracture of the right hip. No dislocation. Left hip negative for fracture. Pelvis negative for fracture.   XR Hand Left G/E 3 Views    Narrative    EXAM: XR HAND LEFT G/E 3 VIEWS  LOCATION: [facility] [place]  DATE/TIME: 11/06/2022, 9:31 AM    INDICATION: Fall, bruising.  COMPARISON: 12/13/2021.      Impression    IMPRESSION: Interval healing of the fracture of the distal radius seen on the old study. This is healed with some mild residual posttraumatic deformity. There  is severe degenerative change at the radioscaphoid articulation as well as the STT joint and   first CMC joint but no acute fractures are identified. Diffuse demineralization.     Asymptomatic COVID-19 Virus (Coronavirus) by PCR Nasopharyngeal    Specimen: Nasopharyngeal; Swab   Result Value Ref Range    SARS CoV2 PCR Negative Negative    Narrative    Testing was performed using the Xpert Xpress SARS-CoV-2 Assay on the   Cepheid Gene-Xpert Instrument Systems. Additional information about   this Emergency Use Authorization (EUA) assay can be found via the Lab   Guide. This test should be ordered for the detection of SARS-CoV-2 in   individuals who meet SARS-CoV-2 clinical and/or epidemiological   criteria. Test performance is unknown in asymptomatic patients. This   test is for in vitro diagnostic use under the FDA EUA for   laboratories certified under CLIA to perform high complexity testing.   This test has not been FDA cleared or approved. A negative result   does not rule out the presence of PCR inhibitors in the specimen or   target RNA in concentration below the limit of detection for the   assay. The possibility of a false negative should be considered if   the patient's recent exposure or clinical presentation suggests   COVID-19. This test was validated by the [facility]. This laboratory is certified under the Clinical Laboratory Improvement Amendments of 1988 (CLIA-88) as qualified to perform high complexity laboratory testing.     ABO/Rh type and screen    Narrative    The following orders were created for panel order ABO/Rh type and screen.  Procedure                               Abnormality         Status                     ---------                               -----------         ------                     Adult Type and Screen[[id number]]                            Final result                 Please view results for these tests on the individual orders.   Adult  Type and Screen   Result Value Ref Range    ABO/RH(D) O POS     Antibody Screen Negative Negative    SPECIMEN EXPIRATION DATE [id number]

## 2022-11-06 NOTE — ANESTHESIA POSTPROCEDURE EVALUATION
Patient: Jewels Cortez    Procedure: Procedure(s):  INTERNAL FIXATION, FRACTURE, RIGHT TROCHANTERIC, HIP, USING PINS OR RODS       Anesthesia Type:  No value filed.    Note:  Disposition: Inpatient   Postop Pain Control: Uneventful            Sign Out: Well controlled pain   PONV: No   Neuro/Psych: Uneventful            Sign Out: Acceptable/Baseline neuro status   Airway/Respiratory: Uneventful            Sign Out: Acceptable/Baseline resp. status   CV/Hemodynamics: Uneventful            Sign Out: Acceptable CV status; No obvious hypovolemia; No obvious fluid overload   Other NRE: NONE   DID A NON-ROUTINE EVENT OCCUR? No           Last vitals:  Vitals Value Taken Time   /67 11/06/22 1605   Temp 97.7  F (36.5  C) 11/06/22 1547   Pulse 59 11/06/22 1610   Resp 16 11/06/22 1610   SpO2 100 % 11/06/22 1610   Vitals shown include unvalidated device data.    Electronically Signed By: Tesfaye De Leon DO  November 6, 2022  4:11 PM

## 2022-11-06 NOTE — INTERVAL H&P NOTE
I have reviewed the surgical (or preoperative) H&P that is linked to this encounter, and examined the patient. There are no significant changes    Clinical Conditions Present on Arrival:  Clinically Significant Risk Factors Present on Admission             # Hypocalcemia: Lowest Ca = 8.4 mg/dL (Ref range: 8.5 - 10.1 mg/dL) in last 30 days, will monitor and replace as appropriate

## 2022-11-06 NOTE — ANESTHESIA CARE TRANSFER NOTE
Patient: Jewels Cortez    Procedure: Procedure(s):  INTERNAL FIXATION, FRACTURE, RIGHT TROCHANTERIC, HIP, USING PINS OR RODS       Diagnosis: Hip fracture, right (H) [S72.001A]  Diagnosis Additional Information: No value filed.    Anesthesia Type:   No value filed.     Note:    Oropharynx: spontaneously breathing  Level of Consciousness: awake  Oxygen Supplementation: face mask  Level of Supplemental Oxygen (L/min / FiO2): 6  Independent Airway: airway patency satisfactory and stable  Dentition: dentition unchanged  Vital Signs Stable: post-procedure vital signs reviewed and stable  Report to RN Given: handoff report given  Patient transferred to: PACU    Handoff Report: Identifed the Patient, Identified the Reponsible Provider, Reviewed the pertinent medical history, Discussed the surgical course, Reviewed Intra-OP anesthesia mangement and issues during anesthesia, Set expectations for post-procedure period and Allowed opportunity for questions and acknowledgement of understanding      Vitals:  Vitals Value Taken Time   BP     Temp     Pulse     Resp     SpO2         Electronically Signed By: LAY Oh CRNA  November 6, 2022  3:49 PM

## 2022-11-06 NOTE — H&P
[facility]    History and Physical  Hospitalist       Date of Admission:  11/6/2022  Date of Service (when I saw the patient): 11/06/22    Assessment & Plan   Jewels Cortez is a 87 year old female patient with past medical history of dementia who resides at [facility], anemia, UTI, recent admission to this facility for encephalopathy in the setting of dementia was brought back from [facility] for evaluation for right hip pain after a fall.  Patient is a poor historian due to her underlying dementia. Patient had a fall last.  She was assisted back to her bed.  She started complaining of right hip.  She was noted to have shortening and angulation of her right leg.  She was brought to emergency room for evaluation.  Laboratory work-up showed sodium 136, potassium 3.5, creatinine 0.45, WBC 15.9, hemoglobin 9.3.  X-ray of the knee pelvis showed intertrochanteric fracture on the right hip.  There is no dislocation.  Pelvis and left hip negative for fracture    Fall  Intertrochanteric fracture on the right hip  -We will continue pain management.  Ordered dilaudid 0.2 mg IV ever 2 hrs as needed for pain.  -Orthopedic surgery consulted from emergency room for evaluation and recommendations.  -We will keep him n.p.o. in case patient needs surgery today.    Dementia: Resides at group home.  Currently has no agitation.      Anemia: Baseline hemoglobin is around 8-9.  Hemoglobin 9.3 today.  We will monitor CBC.    Recent UTI: Completed treatment for UTI.  Currently has no evidence of infection.  We will monitor labs and vital signs    Will admit patient to inpatient status     DVT Prophylaxis: Pneumatic Compression Devices  Code Status: Full Code    Disposition: Expected discharge: anticipate more than 2 nights of hospital course    Addy Terry MD    Primary Care Physician   Holden Block    Chief Complaint   Fall, right hip pain    History is obtained from emergency room  physician.    History of Present Illness   Jewels Cortez is a 87 year old female patient with past medical history of dementia who resides at [facility], anemia, UTI, recent admission to this facility for encephalopathy in the setting of dementia was brought back from [facility] for evaluation for right hip pain after a fall.  Patient is a poor historian due to her underlying dementia.  History was mainly obtained from emergency room physician who talked to staff at her facility.  Patient had a fall last.  She was assisted back to her bed.  She started complaining of right hip.  She was noted to have shortening and angulation of her right leg.  She was brought to emergency room for evaluation.  On arrival to emergency room, her vital signs were checked and showed temperature 96.9, pulse 78, blood pressure 150/74, oxygen saturation 98% on room air.  Laboratory work-up showed sodium 136, potassium 3.5, creatinine 0.45, WBC 15.9, hemoglobin 9.3.  X-ray of the knee pelvis showed intertrochanteric fracture on the right hip.  There is no dislocation.  Pelvis and left hip negative for fracture.  Orthopedic surgery was consulted from emergency room.  Patient was admitted to the hospital for further management.      Past Medical History    I have reviewed this patient's medical history and updated it with pertinent information if needed.   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  Past  Surgical History:   Procedure Laterality Date     BLADDER LIFT SURGERY       CATARACT IOL, RT/LT Bilateral     surgery done in [place]     CERVICAL NECK SURGERY       HYSTERECTOMY       LUMBAR BACK SURGERY       skin cancer      removal of earlobe and face       Prior to Admission Medications   Prior to Admission Medications   Prescriptions Last Dose Informant Patient Reported? Taking?   acetaminophen (TYLENOL) 32 mg/mL liquid  at PRN  Yes Yes   Sig: Take 650 mg by mouth every 4 hours as needed for fever or mild pain   risperiDONE (RISPERDAL) 1 MG/ML solution 2022  No Yes   Si.25 mg Bid and bid prn   senna-docusate (SENOKOT-S/PERICOLACE) 8.6-50 MG tablet  at new rx from  discharge, not on facility MAR yet  No No   Sig: Take 1 tablet by mouth 2 times daily as needed for constipation   trolamine salicylate (ASPERCREME) 10 % external cream 2022  Yes Yes   Sig: Apply 1 g topically 2 times daily      Facility-Administered Medications: None     Allergies   Allergies   Allergen Reactions     Penicillins Unknown     Sulfa Drugs Unknown       Social History   I have reviewed this patient's social history and updated it with pertinent information if needed. Jewels CHUNG Cortez  reports that she has never smoked. She has never used smokeless tobacco. She reports that she does not currently use alcohol. She reports that she does not use drugs.    Family History   I have reviewed this patient's family history and updated it with pertinent information if needed.   No family history on file.    Review of Systems   The 10 point Review of Systems is negative other than noted in the HPI or here. Fall, pain    Physical Exam   Temp: 99.3  F (37.4  C) Temp src: Temporal BP: (!) 163/68 Pulse: 78   Resp: 16 SpO2: 99 % O2 Device: None (Room air)    Vital Signs with Ranges  Temp:  [96.9  F (36.1  C)-99.3  F (37.4  C)] 99.3  F (37.4  C)  Pulse:  [74-85] 78  Resp:  [16-36] 16  BP: (136-163)/(47-74) 163/68  SpO2:  [95 %-99 %]  99 %  0 lbs 0 oz    GEN:  Alert, confused, appears comfortable, NAD.  HEENT:  Normocephalic/atraumatic, no scleral icterus, no nasal discharge, mouth moist.  CV:  Regular rate and rhythm, no murmur or JVD.  S1 + S2 noted, no S3 or S4.  LUNGS:  Clear to auscultation bilaterally without rales/rhonchi/wheezing/retractions.  Symmetric chest rise on inhalation noted.  ABD:  Active bowel sounds, soft, non-tender/non-distended.  No rebound/guarding/rigidity.  EXT:  No edema or cyanosis.  Hands/feet warm to touch with good signs of peripheral perfusion.  No joint synovitis noted.  SKIN:  Dry to touch, no exanthems noted in the visualized areas.  NEURO:  Symmetric muscle strength, sensation to touch grossly intact.  No new focal deficits appreciated.    Data   Data reviewed today:  I personally reviewed   Recent Labs   Lab 11/06/22  0745 11/04/22  0542 11/03/22  1340   WBC 15.9* 6.7 8.5   HGB 9.3* 8.6* 9.7*   MCV 86 87 86    346 406   INR  --   --  1.08    141 140   POTASSIUM 3.5 4.2 4.3   CHLORIDE 99 105 103   CO2 26 26 25   BUN 13.2 11.8 13.8   CR 0.45* 0.50* 0.47*   ANIONGAP 11 10 12   MALICK 8.8 8.4* 8.7*   * 85 95   ALBUMIN  --   --  4.0   PROTTOTAL  --   --  7.0   BILITOTAL  --   --  0.4   ALKPHOS  --   --  109*   ALT  --   --  11   AST  --   --  20       Recent Results (from the past 24 hour(s))   XR Pelvis w Hip Right 1 View    Narrative    EXAM: XR PELVIS AND HIP RIGHT 1 VIEW  LOCATION: [facility]  DATE/TIME: 11/6/2022 8:37 AM    INDICATION: fall, right hip pain; RLE shortened and externally rotated  COMPARISON: None.      Impression    IMPRESSION: Intertrochanteric fracture of the right hip. No dislocation. Left hip negative for fracture. Pelvis negative for fracture.   XR Hand Left G/E 3 Views    Narrative    EXAM: XR HAND LEFT G/E 3 VIEWS  LOCATION: [facility]  DATE/TIME: 11/06/2022, 9:31 AM    INDICATION: Fall, bruising.  COMPARISON: 12/13/2021.       Impression    IMPRESSION: Interval healing of the fracture of the distal radius seen on the old study. This is healed with some mild residual posttraumatic deformity. There is severe degenerative change at the radioscaphoid articulation as well as the STT joint and   first CMC joint but no acute fractures are identified. Diffuse demineralization.

## 2022-11-06 NOTE — ED NOTES
[facility]  ED Nurse Handoff Report    Jewels Cortez is a 87 year old female   ED Chief complaint: Leg Injury  . ED Diagnosis:   Final diagnoses:   Displaced intertrochanteric fracture of left femur, initial encounter for closed fracture (H)     Allergies:   Allergies   Allergen Reactions     Penicillins Unknown     Sulfa Drugs Unknown       Code Status: Full Code  Activity level - Baseline/Home:  Assist X 1. Activity Level - Current:   Assist X 2. Lift room needed: No. Bariatric: No   Needed: No   Isolation: No. Infection: Not Applicable.     Vital Signs:   Vitals:    11/06/22 0715 11/06/22 0730 11/06/22 0745 11/06/22 0800   BP: (!) 145/63      Pulse: 74      Resp:       Temp:       SpO2: 97% 98% 98% 98%       Cardiac Rhythm:  ,      Pain level:    Patient confused: Yes. Patient Falls Risk: Yes.   Elimination Status: Has voided   Patient Report - Initial Complaint: Fall, leg injury. Focused Assessment: 87 year old female with history of dementia, skin cancer and a-fib who presents with a fall and subsequent leg injury. Per nursing, the patient fell last night at her nursing home and was successfully put to bed, however, this morning her nurse noticed some deformity to her right knee. Patient seems to be putting her hand on her right hip upon examination.    Tests Performed: Labs, imaging. Abnormal Results:   Labs Ordered and Resulted from Time of ED Arrival to Time of ED Departure   BASIC METABOLIC PANEL - Abnormal       Result Value    Sodium 136      Potassium 3.5      Chloride 99      Carbon Dioxide (CO2) 26      Anion Gap 11      Urea Nitrogen 13.2      Creatinine 0.45 (*)     Calcium 8.8      Glucose 157 (*)     GFR Estimate >90     CBC WITH PLATELETS AND DIFFERENTIAL - Abnormal    WBC Count 15.9 (*)     RBC Count 3.62 (*)     Hemoglobin 9.3 (*)     Hematocrit 31.0 (*)     MCV 86      MCH 25.7 (*)     MCHC 30.0 (*)     RDW 16.2 (*)     Platelet Count 384      % Neutrophils 88      %  Lymphocytes 4      % Monocytes 7      % Eosinophils 0      % Basophils 0      % Immature Granulocytes 1      NRBCs per 100 WBC 0      Absolute Neutrophils 13.9 (*)     Absolute Lymphocytes 0.6 (*)     Absolute Monocytes 1.2      Absolute Eosinophils 0.0      Absolute Basophils 0.0      Absolute Immature Granulocytes 0.1      Absolute NRBCs 0.0     COVID-19 VIRUS (CORONAVIRUS) BY PCR     XR Hand Left G/E 3 Views   Final Result   IMPRESSION: Interval healing of the fracture of the distal radius seen on the old study. This is healed with some mild residual posttraumatic deformity. There is severe degenerative change at the radioscaphoid articulation as well as the STT joint and    first CMC joint but no acute fractures are identified. Diffuse demineralization.         XR Pelvis w Hip Right 1 View   Final Result   IMPRESSION: Intertrochanteric fracture of the right hip. No dislocation. Left hip negative for fracture. Pelvis negative for fracture.         Treatments provided: See MAR  Family Comments: Updated  OBS brochure/video discussed/provided to patient:  Yes  ED Medications:   Medications   morphine (PF) injection 4 mg (4 mg Intravenous Not Given 11/6/22 0747)     Drips infusing:  No  For the majority of the shift, the patient's behavior Green. Interventions performed were NA.    Sepsis treatment initiated: No     Patient tested for COVID 19 prior to admission: YES    ED Nurse Name/Phone Number: Tea Berumen RN,   9:57 AM      RECEIVING UNIT ED HANDOFF REVIEW  Above ED Nurse Handoff Report was reviewed: YES  Reviewed by: Cristian Busch RN on November 6, 2022 at 11:26 AM

## 2022-11-07 NOTE — CONSULTS
Care Management Follow Up    Length of Stay (days): 1    Expected Discharge Date: 11/09/2022     Concerns to be Addressed:     Disposition  Patient plan of care discussed at interdisciplinary rounds: Yes    Anticipated Discharge Disposition: Unclear at this time.     Anticipated Discharge Services:  unknown  Anticipated Discharge DME:  unknown    Patient/family educated on Medicare website which has current facility and service quality ratings: NA at this time  Education Provided on the Discharge Plan:  Left message for Tiffanie bueno  Patient/Family in Agreement with the Plan: unable to assess    Referrals Placed by CM/SW: npot at this time   Private pay costs discussed: Not applicable @ this time    Additional Information:  Patient well known to writer from her admission last week. Patient lives at [facility] at [facility]. She was just discharge 3 days ago on 11/4/22 back to [facility].    Left message for Tiffanie bueno  Regarding disposition. Currently PT is recommending that patient return to her memory with PT. Spoke with Ondina LOOMIS)   @ [facility] explained to her that PT is recommending patient return to her facility with PT. Ondina reports that they could probably take her back but would like to talk with the niece prior to discharge in case they would like a different level of care such as LTC.    Plan is to talk with ximena regarding disposition.    BILL will follow for disposition.    JUAN ANTONIO Morel   Inpatient Care Coordination   Supervisor  [facility]  [phone]          JUAN ANTONIO [place]

## 2022-11-07 NOTE — PLAN OF CARE
From PACU at 1650, confused very anxious uncooperative but easily distracted.  [place], refused wear bilat. HAs.  Afebrile.  Pain managed with minimal PRN IV dilaudid x1, spitted out sched. tylenol, tolerating cold pack.  No nausea noted, taking sips h20 w/assistance.  LS diminished bases bilat., RA, refused capno.  BS faint, incont. urine - need UA still.  Drsgs x3 CDI.  Repo w/A2.  Reoriented to room and call system.  Reeducated on IS use, pt unable to perform.

## 2022-11-07 NOTE — PLAN OF CARE
OT: Orders received. Chart reviewed and discussed with care team.  Patient has assist with all ADLS at baseline in her memory care unit, further OT not required at this time.  Defer discharge recommendations to PT.  Will complete orders.

## 2022-11-07 NOTE — PLAN OF CARE
Goal Outcome Evaluation:      Plan of Care Reviewed With: patient    Overall Patient Progress: no changeOverall Patient Progress: no change       Pt was lethargic and confused throughout the night. C/o leg pain, managed with IV dilaudid and delmi tylenol. LR running at 75mL/hr. Pt was too lethargic to drink fluids on her own. Dressing CDI. On room air. Potassium and magnesium protocol. PT/OT/SW following. Discharge still pending

## 2022-11-07 NOTE — PROGRESS NOTES
11/07/22 1120   Appointment Info   Signing Clinician's Name / Credentials (PT) Shani Cabrera DPT   Living Environment   People in Home facility resident   Current Living Arrangements assisted living   Home Accessibility no concerns   Living Environment Comments Resides in a U   Self-Care   Usual Activity Tolerance moderate   Current Activity Tolerance poor   Regular Exercise No   Activity/Exercise/Self-Care Comment Pt unable to recall if she uses an AD or what she receives assist with at home.   General Information   Onset of Illness/Injury or Date of Surgery 11/06/22   Referring Physician Elbert Damico PA-C   Patient/Family Therapy Goals Statement (PT) None stated   Pertinent History of Current Problem (include personal factors and/or comorbidities that impact the POC) Jewels Cortez is a 87 year old female patient with past medical history of dementia who resides at [facility], anemia, UTI, recent admission to this facility for encephalopathy in the setting of dementia was brought back from [facility] for evaluation for right hip pain after a fall.  Patient is a poor historian due to her underlying dementia. Patient had a fall last.  She was assisted back to her bed.  She started complaining of right hip.  She was noted to have shortening and angulation of her right leg.  She was brought to emergency room for evaluation.  Laboratory work-up showed sodium 136, potassium 3.5, creatinine 0.45, WBC 15.9, hemoglobin 9.3.  X-ray of the knee pelvis showed intertrochanteric fracture on the right hip.  There is no dislocation.  Pelvis and left hip negative for fracture   Existing Precautions/Restrictions fall   Weight-Bearing Status - RLE weight-bearing as tolerated   Cognition   Affect/Mental Status (Cognition) confused   Orientation Status (Cognition) oriented to;person   Follows Commands (Cognition) follows one-step commands;50-74% accuracy;delayed response/completion;initiation impaired;repetition  of directions required   Pain Assessment   Patient Currently in Pain No  (Pt does not report pain, and does not appear to be in pain)   Integumentary/Edema   Integumentary/Edema Comments Dressing intact to R hip   Posture    Posture Forward head position;Protracted shoulders;Kyphosis   Range of Motion (ROM)   ROM Comment Difficult to assess, appears to have functional ROM while attempting mobility   Strength (Manual Muscle Testing)   Strength (Manual Muscle Testing) Deficits observed during functional mobility   Strength Comments Requires external assist to complete all mobility   Bed Mobility   Comment, (Bed Mobility) sit<>supine with maxA   Transfers   Comment, (Transfers) pt resisting attempt at transfer   Gait/Stairs (Locomotion)   Comment, (Gait/Stairs) Unable to progress to ambulation at time of eval   Balance   Balance Comments Requires modA to sustain sitting balance   Sensory Examination   Sensory Perception patient reports no sensory changes   Coordination   Coordination no deficits were identified   Muscle Tone   Muscle Tone no deficits were identified   Clinical Impression   Criteria for Skilled Therapeutic Intervention Yes, treatment indicated   PT Diagnosis (PT) Impaired functional mobility   Influenced by the following impairments Weakness, deconditioning, pain?   Functional limitations due to impairments Difficulty with bed mobility, transfers, ambulation   Clinical Presentation (PT Evaluation Complexity) Stable/Uncomplicated   Clinical Presentation Rationale medically progressing, clear POC   Clinical Decision Making (Complexity) low complexity   Planned Therapy Interventions (PT) balance training;bed mobility training;gait training;home exercise program;patient/family education;strengthening;stretching;ROM (range of motion);transfer training;progressive activity/exercise;home program guidelines   Risk & Benefits of therapy have been explained evaluation/treatment results reviewed;care  plan/treatment goals reviewed;risks/benefits reviewed;current/potential barriers reviewed;participants voiced agreement with care plan;participants included;patient   PT Total Evaluation Time   PT Eval, Low Complexity Minutes (60346) 10   Plan of Care Review   Plan of Care Reviewed With patient   Physical Therapy Goals   PT Frequency 5x/week   PT Predicted Duration/Target Date for Goal Attainment 11/12/22   PT Goals Bed Mobility;Transfers;Gait   PT: Bed Mobility Minimal assist;Supine to/from sit   PT: Transfers Minimal assist;Sit to/from stand   PT: Gait Minimal assist;Rolling walker;25 feet   PT Discharge Planning   PT Discharge Recommendation (DC Rec) home with assist;home with home care physical therapy   PT Rationale for DC Rec Rec patient discharge back to [facility] with Ax2 for bed mobility and pivot transfers to a wheelchair with HHPT to promote improved functional mobility in the home setting. Anticipate pt will be more likely to participate in mobility and therapy in a familiar environment-therefore having a more positive recovery with higher chance of returning to baseline mobility status.   PT Brief overview of current status Requires MaxA to sit EOB; resiting further mobility   Total Session Time   Total Session Time (sum of timed and untimed services) 10

## 2022-11-07 NOTE — PROGRESS NOTES
Orthopedic Surgery  Jewels Cortez  2022  Admit Date:  2022  POD 1 Day Post-Op  S/P Procedure(s):  Surgical treatment of right intertrochanteric femur fracture with cephalomedullary device    Patient resting comfortably in bed.    Pain controlled.  Tolerating oral intake.    Denies nausea or vomiting  Denies chest pain or shortness of breath  No events overnight.     Alert and orient to person  Vital Sign Ranges  Temperature Temp  Av.2  F (36.8  C)  Min: 97.6  F (36.4  C)  Max: 99.3  F (37.4  C)   Blood pressure Systolic (24hrs), Av , Min:108 , Max:163        Diastolic (24hrs), Av, Min:42, Max:73      Pulse Pulse  Av.1  Min: 63  Max: 89   Respirations Resp  Av.6  Min: 16  Max: 30   Pulse oximetry SpO2  Av.9 %  Min: 88 %  Max: 100 %       Dressing is clean, dry, and intact.   Minimal erythema of the surrounding skin.   Bilateral calves are soft, non-tender.  Bilateral lower extremity is NVI.  Sensation intact bilateral lower extremities  active dorsi and plantar flexion bilaterally  +Dp pulse      Labs:  Recent Labs   Lab Test 22  0648 22  1730 22  0745   POTASSIUM 3.9 4.5 3.5     Recent Labs   Lab Test 22  0648 22  0745 22  0542   HGB 7.4* 9.3* 8.6*     Recent Labs   Lab Test 22  1340   INR 1.08     Recent Labs   Lab Test 22  0648 22  0745 22  0542    384 346       A/P  1. S/p right intertrochanteric femur fracture IM nail   Continue Lovenox for DVT prophylaxis.     Mobilize with PT/OT    WBAT with walker   Leave dressing intact.     Continue current pain regiment.    2. Disposition   Anticipate d/c to TCU based on medial clearance, progress and placement.    Tea Chao PA-C

## 2022-11-08 NOTE — PROGRESS NOTES
Orthopedic Surgery  Jewels Cortez  2022  Admit Date:  2022  POD 2 Days Post-Op  S/P Procedure(s):  Surgical treatment of right intertrochanteric femur fracture with cephalomedullary device    Patient resting comfortably in bed.    Pain controlled.  Tolerating oral intake.    Denies nausea or vomiting  Denies chest pain or shortness of breath  No events overnight.     Alert and orient to person [facility]  Vital Sign Ranges  Temperature Temp  Av.4  F (36.3  C)  Min: 96.8  F (36  C)  Max: 97.9  F (36.6  C)   Blood pressure Systolic (24hrs), Av , Min:118 , Max:143        Diastolic (24hrs), Av, Min:39, Max:48      Pulse Pulse  Av.3  Min: 66  Max: 83   Respirations Resp  Av  Min: 16  Max: 16   Pulse oximetry SpO2  Av.7 %  Min: 92 %  Max: 95 %       Dressing is clean, dry, and intact.   Minimal erythema of the surrounding skin.   Bilateral calves are soft, non-tender.  Bilateral lower extremity is NVI.  Sensation intact bilateral lower extremities  active dorsi and plantar flexion bilaterally  +Dp pulse    Hgb trending down - hospitalist aware and ordered transfusion  Labs:  Recent Labs   Lab Test 22  0736 22  0648 22  1730   POTASSIUM 3.7 3.9 4.5     Recent Labs   Lab Test 22  0736 22  1702 22  0648   HGB 7.0* 7.3* 7.4*     Recent Labs   Lab Test 22  1340   INR 1.08     Recent Labs   Lab Test 22  0736 22  0648 22  0745    331 384       A/P  1. S/p right intertrochanteric femur fracture IM nail   Continue Lovenox for DVT prophylaxis.     Mobilize with PT/OT    WBAT with walker   Leave dressings intact.     Continue current pain regiment.    2. Disposition   Anticipate d/c to TCU based on placement.    Tea Chao PA-C

## 2022-11-08 NOTE — PLAN OF CARE
Pt drowsy but wakes up to touch. Hard of hearing. VSS except low diastolic BP and HR occasionally drops to 48, 49. Pt quickly recovers to 50s to 60s. After repositioning, pt was able to maintain HR in the 70s. Currently sleeping. Notified MD. On RA. Reported pain on surgical site. Gave scheduled Tylenol. Was able to take pills after crushing and with apple sauce. Dressing dry and intact. Unable to assess CMS due to pt being confused and [place]. Has not voided, Bladder scan for 27 ml, and 100ml. IVF. Will continue to provide supportive care.

## 2022-11-08 NOTE — PLAN OF CARE
RN shift [id number]:  Pleasantly confused at rest, mild agitation w/cares, cooperative majority times.  Very [place], refused wear bilat. HAs.  Total cares.  Pain managed with sched. tylenol, refused cold pack.  No nausea, total feed, poor appetite refused supper.  LS diminished bilat., RA, refused IS.  Drsgs x3 CDI.  Void scant urine, PVR max 230s, monitor.  Repo w/A2, lift if OOB.  Anticipate DC to TCU.

## 2022-11-08 NOTE — PROGRESS NOTES
"Hospitalist Medicine Progress Note   [facility]       Jewels Cortez is a 87 year old lady with dementia who resides in memory care unit, came to [facility] 11/6/2022 after she fell down and had right hip pain and after x-ray of the pelvic was diagnosed with intertrochanteric fracture of the right hip for which she underwent cephalomedullary device 11/6/2022 by Dr. Dave French MD. She has anemia, is confused       Date of Admission:  11/6/2022  Assessment & Plan     Fall  Fracture of right intertrochanteric femur  S/p cephalomedullary device implantation 11/6/2022 by Dr. Dave French MD  Management per orthopedic service    Severe dementia with ? Delerium  Resides in a group home  We will continue with risperidone as she gets at home  For the acute confusion will give Zyprexa 10 mg IM X 1   Patient who was sleepy yesterday is quite anxious today     Acute on chronic Anemia   Hb is 7   Transfuse 1 unit of blood           Plan:   Zyprexa for confusion and agitation  - otherwise will continue Resperidol as she takes at home/SNF   Monitor Hemoglobin and transuse x 1 with Hb of 7  Cbc in am    Diet: Advance Diet as Tolerated: Regular Diet Adult    DVT Prophylaxis: as per Orthopedics   Todd Catheter: Not present  Code Status: Full Code               Hugo Parker MD  Hospitalist Service  [facility]    ______________________________________________________________________    Interval History     Symptoms   Patient is quite anxious * I dont know what to do\" to which I asked her as to  What is garduno with her and she says that \" I don't know\"     Review of Systems:   Earlier she was agitated, confused and hitting at the staff     Data reviewed today: I reviewed all medications, new labs and imaging results over the last 24 hours.     Physical Exam   Vital Signs: Temp: 96.8  F (36  C) Temp src: Temporal BP: 121/47 Pulse: 66   Resp: 16 SpO2: 94 % O2 Device: " None (Room air)    Weight: 0 lbs 0 oz      GENERAL: Patient is not  in acute distress  NECK:  no Jugular Venous distention  HEART: S1 S2 regular Rate and Rhythm, there is no murmur,   LUNGS: Respirations are  not laboured, Lungs are  clear to auscultation without Crepitations or Wheezing   ABDOMEN: Soft, there is no tenderness ,Bowel Sounds are  Positive   CNS: Alert, Pleasantly confused and Anxious       Data   Recent Labs   Lab 11/08/22  0736 11/07/22  1702 11/07/22  0648 11/06/22  1730 11/06/22  0745 11/04/22  0542 11/03/22  1340   WBC 9.9  --  8.4  --  15.9* 6.7 8.5   HGB 7.0* 7.3* 7.4*  --  9.3* 8.6* 9.7*   MCV 88  --  88  --  86 87 86     --  331  --  384 346 406   INR  --   --   --   --   --   --  1.08   NA  --   --  138  --  136 141 140   POTASSIUM 3.7  --  3.9 4.5 3.5 4.2 4.3   CHLORIDE  --   --  104  --  99 105 103   CO2  --   --  26  --  26 26 25   BUN  --   --  10.1  --  13.2 11.8 13.8   CR  --   --  0.46* 0.39* 0.45* 0.50* 0.47*   ANIONGAP  --   --  8  --  11 10 12   MALICK  --   --  8.3*  --  8.8 8.4* 8.7*   GLC  --   --  107*  --  157* 85 95   ALBUMIN  --   --   --   --   --   --  4.0   PROTTOTAL  --   --   --   --   --   --  7.0   BILITOTAL  --   --   --   --   --   --  0.4   ALKPHOS  --   --   --   --   --   --  109*   ALT  --   --   --   --   --   --  11   AST  --   --   --   --   --   --  20         No results found for this or any previous visit (from the past 24 hour(s)).

## 2022-11-08 NOTE — PROGRESS NOTES
Hospitalist Medicine Progress Note   [facility]       Jewels Cortez is a 87 year old lady with dementia who resides in memory care unit, came to [facility] 11/6/2022 after she fell down and had right hip pain and after x-ray of the pelvic was diagnosed with intertrochanteric fracture of the right hip for which she underwent cephalomedullary device 11/6/2022 by Dr. Dave French MD. She has anemia, is confused       Date of Admission:  11/6/2022  Assessment & Plan     Fall  Fracture of right intertrochanteric femur  S/p cephalomedullary device implantation 11/6/2022 by Dr. Dave French MD  Management per orthopedic service    Severe dementia  Resides in a group home  We will continue with risperidone as she gets at home              Plan:   Patient is sleepy and is not eating therefore we will continue IV fluids  Monitor Hemoglobin and transuse if less than 7  Cbc in am    Diet: Advance Diet as Tolerated: Regular Diet Adult    DVT Prophylaxis: as per Orthopedics   Todd Catheter: Not present  Code Status: Full Code               Hugo Parker MD  Hospitalist Service  [facility]    ______________________________________________________________________    Interval History     Symptoms   Patient is sleepy     Review of Systems:   As above     Data reviewed today: I reviewed all medications, new labs and imaging results over the last 24 hours.     Physical Exam   Vital Signs: Temp: 97.8  F (36.6  C) Temp src: Temporal BP: (!) 143/48 Pulse: 83   Resp: 16 SpO2: 92 % O2 Device: None (Room air)    Weight: 0 lbs 0 oz      GENERAL: Patient is not  in acute distress  NECK:  no Jugular Venous distention  HEART: S1 S2 regular Rate and Rhythm, there is no murmur,   LUNGS: Respirations are  not laboured, Lungs are  clear to auscultation without Crepitations or Wheezing   ABDOMEN: Soft, there is no tenderness ,Bowel Sounds are  Positive   CNS: sleepy.    Data   Recent  Labs   Lab 11/07/22  1702 11/07/22  0648 11/06/22  1730 11/06/22  0745 11/04/22  0542 11/03/22  1340   WBC  --  8.4  --  15.9* 6.7 8.5   HGB 7.3* 7.4*  --  9.3* 8.6* 9.7*   MCV  --  88  --  86 87 86   PLT  --  331  --  384 346 406   INR  --   --   --   --   --  1.08   NA  --  138  --  136 141 140   POTASSIUM  --  3.9 4.5 3.5 4.2 4.3   CHLORIDE  --  104  --  99 105 103   CO2  --  26  --  26 26 25   BUN  --  10.1  --  13.2 11.8 13.8   CR  --  0.46* 0.39* 0.45* 0.50* 0.47*   ANIONGAP  --  8  --  11 10 12   MALICK  --  8.3*  --  8.8 8.4* 8.7*   GLC  --  107*  --  157* 85 95   ALBUMIN  --   --   --   --   --  4.0   PROTTOTAL  --   --   --   --   --  7.0   BILITOTAL  --   --   --   --   --  0.4   ALKPHOS  --   --   --   --   --  109*   ALT  --   --   --   --   --  11   AST  --   --   --   --   --  20         No results found for this or any previous visit (from the past 24 hour(s)).

## 2022-11-08 NOTE — PROGRESS NOTES
Care Management Follow Up    Length of Stay (days): 2    Expected Discharge Date: 11/09/2022     Concerns to be Addressed:       Patient plan of care discussed at interdisciplinary rounds: Yes    Anticipated Discharge Disposition: Home Care     Anticipated Discharge Services:    Anticipated Discharge DME:      Patient/family educated on Medicare website which has current facility and service quality ratings:    Education Provided on the Discharge Plan:    Patient/Family in Agreement with the Plan: unable to assess    Referrals Placed by CM/SW:    Private pay costs discussed: Not applicable    Additional Information:    Spoke with pt niece over the phone who explained that she is traveling to [place] and did not have great service but did confirm that she would like pt to go back to her  if possible with homecare. Pt had homecare through [facility] prior and would like that set for her upon discharge.     Spoke with RN at pt  [phone] Ondina F: [phone] to explain that her niece would prefer that she go back to  with homecare. Pt [facility] explained that they would need an RN to RN prior to her return and would like updates on pt mobility while she is here. RN explained that pt benefits from staff writing things down for her. If pt were to return she would need to return before 1500 during the week. SW following.     REANNA Hannah, SW  Inpatient Care Coordination  Ortho/Spine Unit  [phone]  Anjana Nguyen, BILL

## 2022-11-08 NOTE — PLAN OF CARE
"Confused, alert to self but barrier with hearing, writer was able to put hearing aids in but did not seem effective. Family stated she responds to writing/reading notes but that was not successful. Patient agitated when doing cares, otherwise resting comfortably in between. Refused most cares this morning, hitting, yelling \"get out\" \"leave me alone\". Attempted multiple times to give medications without success. Was able late this afternoon to get her to take tylenol crushed in applesauce. Used lift to get her to the recliner. Needed assistance to eat, ate some of her meal tonight. Discharge planning pending back to memory care vs TCU.     "

## 2022-11-09 NOTE — PROGRESS NOTES
Hospitalist Medicine Progress Note   [facility]       Jewels Cortez is a 87 year old lady with dementia who resides in memory care unit, came to [facility] 11/6/2022 after she fell down and had right hip pain and after x-ray of the pelvic was diagnosed with intertrochanteric fracture of the right hip for which she underwent cephalomedullary device 11/6/2022 by Dr. Dave French MD. She has anemia, is confused       Date of Admission:  11/6/2022  Assessment & Plan     Fall  Fracture of right intertrochanteric femur  S/p cephalomedullary device implantation 11/6/2022 by Dr. Dave French MD  Management per orthopedic service    Severe dementia with ? Delerium  Resides in a group home  We will continue with risperidone as she gets at home  For the acute confusion will give Zyprexa 10 mg IM X 1   Patient who was sleepy yesterday is quite anxious today     Acute on chronic Anemia   Hb is 7   Transfuse 1 unit of blood           Plan:   Monitor Hemoglobin in am        Diet: Advance Diet as Tolerated: Regular Diet Adult  Diet    DVT Prophylaxis: as per Orthopedics   Todd Catheter: Not present  Code Status: Full Code               Hugo Parker MD  Hospitalist Service  [facility]    ______________________________________________________________________    Interval History     Symptoms   Patient is quite anxious and denies any new symptoms     Review of Systems:   Sshe was not agitated, confused or hitting at the staff anymore    Data reviewed today: I reviewed all medications, new labs and imaging results over the last 24 hours.     Physical Exam   Vital Signs: Temp: 97.3  F (36.3  C) Temp src: Temporal BP: 113/48 Pulse: 70   Resp: 20 SpO2: 94 % O2 Device: None (Room air)    Weight: 0 lbs 0 oz      GENERAL: Patient is not  in acute distress  NECK:  no Jugular Venous distention  HEART: S1 S2 regular Rate and Rhythm, there is no murmur,   LUNGS: Respirations are   not laboured, Lungs are  clear to auscultation without Crepitations or Wheezing   ABDOMEN: Soft, there is no tenderness ,Bowel Sounds are  Positive   CNS: Alert, Pleasantly confused and Anxious       Data   Recent Labs   Lab 11/09/22  0700 11/08/22  0736 11/07/22  1702 11/07/22  0648 11/06/22  1730 11/06/22  0745 11/04/22  0542 11/03/22  1340   WBC  --  9.9  --  8.4  --  15.9* 6.7 8.5   HGB  --  7.0* 7.3* 7.4*  --  9.3* 8.6* 9.7*   MCV  --  88  --  88  --  86 87 86    340  --  331  --  384 346 406   INR  --   --   --   --   --   --   --  1.08   NA  --   --   --  138  --  136 141 140   POTASSIUM 3.5 3.7  --  3.9 4.5 3.5 4.2 4.3   CHLORIDE  --   --   --  104  --  99 105 103   CO2  --   --   --  26  --  26 26 25   BUN  --   --   --  10.1  --  13.2 11.8 13.8   CR  --   --   --  0.46* 0.39* 0.45* 0.50* 0.47*   ANIONGAP  --   --   --  8  --  11 10 12   MALICK  --   --   --  8.3*  --  8.8 8.4* 8.7*   GLC  --   --   --  107*  --  157* 85 95   ALBUMIN  --   --   --   --   --   --   --  4.0   PROTTOTAL  --   --   --   --   --   --   --  7.0   BILITOTAL  --   --   --   --   --   --   --  0.4   ALKPHOS  --   --   --   --   --   --   --  109*   ALT  --   --   --   --   --   --   --  11   AST  --   --   --   --   --   --   --  20         No results found for this or any previous visit (from the past 24 hour(s)).

## 2022-11-09 NOTE — PLAN OF CARE
Pt oriented to self. VSS on RA. PAINAD score of 0. Dressings x3 CDI. Voiding minimal amounts of garret urine via purewick. Not OOB this shift; Ax2 w/ lift. Regular diet with minimal appetite. Did not eat her dinner but had approx. 1.5 cups of ice cream with pills.     Goal Outcome Evaluation:    Overall Patient Progress: no change    Anticipate discharge to TCU when medically ready.

## 2022-11-09 NOTE — PROGRESS NOTES
Care Management Follow Up    Length of Stay (days): 3    Expected Discharge Date: 11/10/2022     Concerns to be Addressed:       Patient plan of care discussed at interdisciplinary rounds: Yes    Anticipated Discharge Disposition: Home Care     Anticipated Discharge Services:    Anticipated Discharge DME:      Patient/family educated on Medicare website which has current facility and service quality ratings:    Education Provided on the Discharge Plan:    Patient/Family in Agreement with the Plan: unable to assess    Referrals Placed by CM/SW:    Private pay costs discussed: Not applicable    Additional Information:    Left  for DON at [facility] [phone]  F: [phone] to update that pt is still requiring Ax-2 with a lift today with therapy. Looking for confirmation that  is able to accommodate a lift.     Addendum    Spoke with pt ximena over the phone to discuss discharge planning. Pt ximena explained that she believes pt would improve at her [facility] vs going to TCU. Pt had been to [facility] previously but would prefer for pt to go back to her [facility] to recover.    Awaiting call back from  to confirm that they can accept pt back as Ax-2.       REANNA Hannah, SW  Inpatient Care Coordination  Ortho/Spine Unit  [phone]  Anjana Nguyen, SW

## 2022-11-09 NOTE — PLAN OF CARE
Patient given help for feeding. C/o pain with movement. Refused cares this afternoon. Became irritable this afternoon.

## 2022-11-10 NOTE — PLAN OF CARE
Goal Outcome Evaluation:            Patient vital signs are at baseline: Yes  Patient able to ambulate as they were prior to admission or with assist devices provided by therapies during their stay:  No,  Reason:  Refuses activity  Patient MUST void prior to discharge:  Yes  Patient able to tolerate oral intake:  Yes  Pain has adequate pain control using Oral analgesics:  Yes  Does patient have an identified :  Yes  Has goal D/C date and time been discussed with patient:  Yes    Pt alert to self, vss, afebrile,  inconsistent with following commends.  VSS and afebrile. Dressing CDI. Assist of two with walker and gait belt, but no activity this shift.  Incontinent bladder and bowel.  Tolerating regular diet well, assistance with feeding.  SW working on getting pt back to .  Will continue to monitor.

## 2022-11-10 NOTE — PLAN OF CARE
Goal Outcome Evaluation:    7a-7p RN  Patient vital signs are at baseline: Yes  Patient able to ambulate as they were prior to admission or with assist devices provided by therapies during their stay:  No,  Reason:  lift for transfers  Patient MUST void prior to discharge:  Yes, incontinent  Patient able to tolerate oral intake:  Yes  Pain has adequate pain control using Oral analgesics:  Yes, tylenol for pain. Ice and essential oils used.  Does patient have an identified :  Yes,   Has goal D/C date and time been discussed with patient:  Yes, stretcher transport set for 11/11 at 1300 if family is able to obtain a w/c for her before then    Int confusion and agitation, PRN Risperdal given 1x, very sleepy this evening, incontinent bowel/bladder, drsg C/D/.

## 2022-11-10 NOTE — PROGRESS NOTES
Goal Outcome Evaluation    Patient alert but confused and restless, refuse care, vital sign stable, Room air, assist of x2 pt incontinent both B&B, sailin lock, pain controled with PRN Tylenol. Pt takes pill crunched with pudding, Pt needs assist with feeding. Plan possible to  when all medically needs met.

## 2022-11-10 NOTE — PROGRESS NOTES
Hospitalist Medicine Progress Note   [facility]       Jewels Cortez is a 87 year old lady with dementia who resides in memory care unit, came to [facility] 11/6/2022 after she fell down and had right hip pain and after x-ray of the pelvic was diagnosed with intertrochanteric fracture of the right hip for which she underwent cephalomedullary device 11/6/2022 by Dr. Dave French MD. She has anemia, is confused       Date of Admission:  11/6/2022  Assessment & Plan     Fall  Fracture of right intertrochanteric femur  S/p cephalomedullary device implantation 11/6/2022 by Dr. Dave French MD  Management per orthopedic service    Severe dementia with ? Delerium  Resides in a group home  We will continue with risperidone as she gets at home  For the acute confusion will give Zyprexa 10 mg IM X 1   Patient who was sleepy yesterday is quite anxious today     Acute on chronic Anemia   Hb is 7   Transfuse 1 unit of blood     Acute Blood loss Anemia               Plan:   Monitor Hemoglobin         Diet: Advance Diet as Tolerated: Regular Diet Adult  Diet    DVT Prophylaxis: as per Orthopedics   Todd Catheter: Not present  Code Status: Full Code               Hugo Parker MD  Hospitalist Service  [facility]    ______________________________________________________________________    Interval History     Symptoms   No new symptoms     Review of Systems:   She was not agitated, confused or hitting at the staff anymore    Data reviewed today: I reviewed all medications, new labs and imaging results over the last 24 hours.     Physical Exam   Vital Signs: Temp: 97.3  F (36.3  C) Temp src: Temporal BP: (!) 169/49 Pulse: 77   Resp: 20 SpO2: 96 % O2 Device: None (Room air)    Weight: 0 lbs 0 oz      GENERAL: Patient is not  in acute distress  NECK:  no Jugular Venous distention  HEART: S1 S2 regular Rate and Rhythm, there is no murmur,   LUNGS: Respirations are  not  laboured, Lungs are  clear to auscultation without Crepitations or Wheezing   ABDOMEN: Soft, there is no tenderness ,Bowel Sounds are  Positive   CNS: Alert, Pleasantly confused and Anxious       Data   Recent Labs   Lab 11/10/22  0633 11/09/22  0700 11/08/22  0736 11/07/22  1702 11/07/22  0648 11/06/22  1730 11/06/22  0745 11/04/22  0542   WBC  --   --  9.9  --  8.4  --  15.9* 6.7   HGB 9.8*  --  7.0* 7.3* 7.4*  --  9.3* 8.6*   MCV  --   --  88  --  88  --  86 87   PLT  --  424 340  --  331  --  384 346   NA  --   --   --   --  138  --  136 141   POTASSIUM 3.4 3.5 3.7  --  3.9 4.5 3.5 4.2   CHLORIDE  --   --   --   --  104  --  99 105   CO2  --   --   --   --  26  --  26 26   BUN  --   --   --   --  10.1  --  13.2 11.8   CR  --   --   --   --  0.46* 0.39* 0.45* 0.50*   ANIONGAP  --   --   --   --  8  --  11 10   MALICK  --   --   --   --  8.3*  --  8.8 8.4*   GLC  --   --   --   --  107*  --  157* 85         No results found for this or any previous visit (from the past 24 hour(s)).

## 2022-11-10 NOTE — PROGRESS NOTES
Orthopedic Surgery  Jewels Cortez  11/10/2022  Admit Date:  2022  POD 4 Days Post-Op  S/P Procedure(s):  Surgical treatment of right intertrochanteric femur fracture with cephalomedullary device    Patient resting comfortably in bed.    Pain controlled.  Tolerating oral intake.    Denies nausea or vomiting  Denies chest pain or shortness of breath  No events overnight.      Alert and orient to person [facility]  Vital Sign Ranges  Temperature Temp  Av.5  F (36.4  C)  Min: 97.3  F (36.3  C)  Max: 97.6  F (36.4  C)   Blood pressure Systolic (24hrs), Av , Min:160 , Max:169        Diastolic (24hrs), Av, Min:49, Max:57      Pulse Pulse  Av  Min: 77  Max: 77   Respirations Resp  Av  Min: 20  Max: 20   Pulse oximetry SpO2  Av %  Min: 96 %  Max: 96 %       Dressing is clean, dry, and intact.   Minimal erythema of the surrounding skin.   Bilateral calves are soft, non-tender.  Bilateral lower extremity is NVI.  Sensation intact bilateral lower extremities  active dorsi and plantar flexion bilaterally  +Dp pulse    Labs:  Recent Labs   Lab Test 11/10/22  0633 22  0700 22  0736   POTASSIUM 3.4 3.5 3.7     Recent Labs   Lab Test 11/10/22  0633 22  0736 22  1702   HGB 9.8* 7.0* 7.3*     Recent Labs   Lab Test 22  1340   INR 1.08     Recent Labs   Lab Test 22  0700 22  0736 22  0648    340 331     A/P  1. S/p right intertrochanteric femur fracture IM nail              Continue Lovenox for DVT prophylaxis.                Mobilize with PT/OT               WBAT with walker              Leave dressings intact.                Continue current pain regiment.     2. Disposition              Anticipate d/c to  with HHC or TCU based on placement.    Tea Chao PA-C

## 2022-11-10 NOTE — PROGRESS NOTES
Care Management Follow Up    Length of Stay (days): 4    Expected Discharge Date: 11/10/2022     Concerns to be Addressed:       Patient plan of care discussed at interdisciplinary rounds: Yes    Anticipated Discharge Disposition: Home Care     Anticipated Discharge Services:    Anticipated Discharge DME:      Patient/family educated on Medicare website which has current facility and service quality ratings:    Education Provided on the Discharge Plan:    Patient/Family in Agreement with the Plan: unable to assess    Referrals Placed by CM/SW:    Private pay costs discussed: Not applicable    Additional Information:    Spoke with RN at pt  [phone] F: [phone] who explained she was still checking in with her DON to see if they can accept pt back as Ax-2 with a lift. Awaiting call back.     Addendum    RN called back to explain that they would like to speak with PT after they meet with her today. Informed PT. Called ximena to update.     Pt is open to home PT with [facility] and would have that service if discharged back to .     Addendum    Pt  explained that they can accept pt back but pt will need a WC prior to her return as they do not have a WC for her at the facility.  can accept pt back if transport is set for 1300 tomorrow 11/11.     Spoke with pt ximena who is working on getting pt a WC, she states that she will call once they have obtained the WC.    Set stretcher transport due to pt confusion and agitation for tomorrow 11/11 at 1300, will need to cancel if WC is not obtained.     Addendum    PT ximena will have a WC delivered to  prior to pt return at 1300. Pt will discharge at 1300 11/11 back to  via stretcher with home PT.     REANNA Hannah, SW  Inpatient Care Coordination  Ortho/Spine Unit  [phone]  Anjana Nguyen, SW

## 2022-11-10 NOTE — PROGRESS NOTES
Orthopedic Surgery  Jewels Cortez  11/09/2022     Admit Date:  11/6/2022    1. Assessment/Plan:   Continue Lovenox for DVT prophylaxis.     Mobilize with PT/OT    WBAT.     Continue current pain regiment.   Dressings: Change POD 3, and as needed.    Follow-up: 2 weeks at [facility]. Or with [facility] provider depending on TCU placement.    Hgb trending down - hospital medicine is aware and is monitoring.     2. Disposition   Anticipate d/c to TCU when medically cleared and progressing in PT. Waiting on placement.    Elbert Damico PA-C  [facility]  ______________________________________________________________________________________________________________________________    POD: 3 Days Post-Op   Procedure(s):  Surgical treatment of right intertrochanteric femur fracture with cephalomedullary device    Somnolent. Very hard of hearing so difficult to converse with patient.   Patient resting comfortably in bed.    Pain controlled.  Tolerating oral intake.    No events overnight.     Temp:  [97.3  F (36.3  C)-98  F (36.7  C)] 97.3  F (36.3  C)  Pulse:  [70-76] 70  Resp:  [18-20] 20  BP: (113-121)/(46-48) 113/48  SpO2:  [94 %-96 %] 96 %    Dressing is clean, dry, and intact.   Minimal erythema of the surrounding skin.   Bilateral calves are soft, non-tender.  Right lower extremity is NVI.  Sensation intact bilateral lower extremities  Patient able to resist dorsi and plantar flexion bilaterally  Pain with movement of right leg.   +Dp pulse    Labs:  Recent Labs   Lab Test 11/09/22  0700 11/08/22  0736 11/07/22  1702 11/07/22  0648 11/06/22  0745   WBC  --  9.9  --  8.4 15.9*   HGB  --  7.0* 7.3* 7.4* 9.3*    340  --  331 384     Recent Labs   Lab Test 11/03/22  1340   INR 1.08     No lab results found.

## 2022-11-11 NOTE — PROGRESS NOTES
Alert to self,up with assist of two using a lift,lung sound clear, tolerating regular diet,pain controled with prn tylenol.Dressing to the hip c/d/i.Pills crushed with apple sauce.Incontinent of B/B.On K,Mg protocol am recheck.Plan to discharge back to memory care tomorrow @ 1300hr via stretcher.

## 2022-11-11 NOTE — PROGRESS NOTES
Care Management Discharge Note    Discharge Date: 11/11/2022       Discharge Disposition: Home Care    Discharge Services:      Discharge DME:      Discharge Transportation: agency  Reviewed out of pocket cost for [facility] stretcher transport, $1117.00 for base rate and $26.06 per mile to the destination. Pt/family expressed understanding and are agreeable to this.      Patient requires stretcher transportation due to confusion/agitation/AX-2    Stretcher transportation has been arranged for 1300 11/11. Patient and bedside nurse notified of transportation time.    Ambulance PCS form completed and placed in patient chart. Ambulance PCS form should be given to transportation team.    Private pay costs discussed: Transport    Patient/family educated on Medicare website which has current facility and service quality ratings:      Education Provided on the Discharge Plan:    Persons Notified of Discharge Plans: RN, pt/family, MD, facility  Patient/Family in Agreement with the Plan: unable to assess    Handoff Referral Completed: yes    Additional Information:    Attempted to call pt [facility] to inform that this writer sent orders P: [phone] F: [phone] and to inform that pt is still on track for 1300 discharge via stretcher. Faxed orders. Pt ximena confirmed that WC will be delivered prior to pt return my [facility].     Informed [facility] of orders.     REANNA Hannah, SW  Inpatient Care Coordination  Ortho/Spine Unit  [phone]  Anjana Nguyen, LGSW

## 2022-11-11 NOTE — PROGRESS NOTES
Goal Outcome Evaluation    Patient is alert but disoriented to place and time, patient assist of x2 and lift, vital sign stable, room air,  Saline lock. Pain is controled with PRN tylenol and oxy.     Plan for discharge today to .

## 2022-11-11 NOTE — PLAN OF CARE
Physical Therapy Discharge Summary    Reason for therapy discharge:    Discharged to home with home therapy.    Progress towards therapy goal(s). See goals on Care Plan in [facility] electronic health record for goal details.  Goals not met.  Barriers to achieving goals:   discharge from facility.    Therapy recommendation(s):    Continued therapy is recommended.  Rationale/Recommendations:  HHPT to maximize safety and indep in the home setting. Pt likely to have better participation in rehab in a familiar setting.    Note: Pt not seen by documenting PT on this date. Information obtained from chart review.

## 2022-11-11 NOTE — PLAN OF CARE
Goal Outcome Evaluation:     Pt was discharged via stretcher to her memory care at 1320. Pts niece Tiffanie was here prior to her discharge and brought in her some new clothes. Pt refused to put the sweatshirt or the coat on and she argued that they were to big for her. Patient was discharged with her bilat hearing aides in bilat ears. Pts new shirt and winter coat were sent with transport. IV removed prior to discharge. AVS copy was sent to the memory care with a note to give to the nephew or niece.

## 2022-11-11 NOTE — DISCHARGE INSTRUCTIONS
Your home care referral was sent to [facility]  If you haven't heard from them within the next 24-48 hours,  Please call them at [phone]

## 2022-11-12 NOTE — TELEPHONE ENCOUNTER
DATE: November 12, 2022 TIME: 1241    Jewels Cortez is a 87 year old female who was recently hospitalized for a displaced intertrochanteric fracture of the left femur, undergoing ORIF.    SUBJECTIVE/OBJECTIVE    The nurse called today to report: The patient returning from the hospital without any pain medications.  He has been receiving acetaminophen but only 650 mg.  She prefers to take it in liquid form.    ASSESSMENT/PLAN  Increase acetaminophen to 1000 mg (liquid) and give every 8 hours scheduled. Orders not entered into list of medications, as there is no pharmacy chosen.    Electronically signed by:     LAY Amezquita CNP

## 2022-11-14 NOTE — LETTER
11/14/2022        RE: [facility]  [address]  [address]  [facility] [address]        [facility]  PRIMARY CARE PROVIDER AND CLINIC:  Holden Block, LAY CNP, [address] [facility] / [place] [address]  Chief Complaint   Patient presents with     Hospital F/U     [facility] Record Number:  [id number]  Place of Service where encounter took place:  [facility]  [facility] ([facility]) [[id number]]    Jewels Cortez  is a 87 year old  (1935), returned to the above facility from  [facility]. Hospital stay 11/6/22 - 11/11/22. .  Admitted to this facility for  rehab, medical management and nursing care.    HPI:    HPI information obtained from: facility chart records, facility staff, patient report and [facility] chart review.   Brief Summary of Hospital Course:     R hip pain. Fall 11/6/22. FX R hip. ORIF 11/6/22. Returned to AL 11/11/22 to AL. Had ongoing R hip pain. Difficulty with WB. Prn tylenol changed to sched. Had IV dilaudid in hosp.  Pain ongoing with any transfer or ROM RLE.    ABLA: hgb down to 7 s/p ORIF. Transfused with with 1 unit PRBCs. 11/10/22 hgb 9.8. O2 sats stable.     Weakness: assist of 2 for stand/pivot transfers. No WB restrictions. Not currently amb.    Alzheimer's.  Increased anxiety, confusion during hosp. Stay. Cont. On risperdal. Per  Staff, mood gen. Stable.         Updates on Status Since Skilled nursing Admission: decrease in act. Level. Ongoing R hip pain.     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  PAST SURGICAL HISTORY:   has a past surgical history that includes Hysterectomy; CERVICAL NECK SURGERY; LUMBAR BACK SURGERY; BLADDER LIFT SURGERY; cataract iol, rt/lt (Bilateral, 2020); skin cancer; and Open reduction internal fixation hip nailing (Right, 11/6/2022).  FAMILY HISTORY: family history is not on file.  SOCIAL HISTORY:   reports that she has never smoked. She has never used smokeless tobacco. She reports that she does not currently use alcohol. She reports that she does not use drugs.    Post Discharge Medication Reconciliation Status: discharge medications reconciled and changed, per note/orders    Current Outpatient Medications   Medication Sig Dispense Refill     traMADol (ULTRAM) 50 MG tablet Take 1 tablet (50 mg) by mouth 3 times daily. May also take 1 tablet (50 mg) 2 times daily as needed for severe pain. Do all this for 30 days. 120 tablet 0     acetaminophen (TYLENOL) 32 mg/mL liquid Take 650 mg by mouth every 4 hours as needed for fever or mild pain       risperiDONE (RISPERDAL) 1 MG/ML solution 0.25 mg Bid and bid prn 30 mL 11     senna-docusate (SENOKOT-S/PERICOLACE) 8.6-50 MG tablet Take 1 tablet by mouth 2 times daily as needed for constipation 30 tablet 0     trolamine salicylate (ASPERCREME) 10 % external cream Apply 1 g topically 2 times daily           ROS:  Limited secondary to cognitive impairment but today pt reports some R hip pain    Vitals:  /64   Pulse 78   Resp 16   Wt 47.2 kg (104 lb)   SpO2 93%   BMI 20.31 kg/m    Exam:  GENERAL APPEARANCE:  Alert, in no distress, cooperative  ENT:  Mouth and posterior oropharynx normal, moist mucous membranes, [place]  EYES:  EOM, conjunctivae, lids, pupils and irises normal, PERRL  RESP:  respiratory effort and palpation of chest normal, lungs clear to auscultation , no respiratory distress, diminished breath sounds  bibasilar  CV:  Palpation and auscultation of heart done , regular rate and rhythm, no murmur, rub, or gallop, no edema  ABDOMEN:  normal bowel sounds, soft, nontender, no hepatosplenomegaly or other masses, no guarding or rebound  M/S:   muscle strength 5/5 UEs, gen. LE weakness. pain with ROM, palp. RLE  NEURO:   Cranial nerves 2-12 are normal tested and grossly at patient's baseline, no tremor  PSYCH:  insight and judgement impaired, memory impaired , affect and mood normal    Lab/Diagnostic data:    Most Recent 3 CBC's:Recent Labs   Lab Test 11/10/22  0633 11/09/22  0700 11/08/22  0736 11/07/22  1702 11/07/22  0648 11/06/22  0745   WBC  --   --  9.9  --  8.4 15.9*   HGB 9.8*  --  7.0* 7.3* 7.4* 9.3*   MCV  --   --  88  --  88 86   PLT  --  424 340  --  331 384     Most Recent 3 BMP's:Recent Labs   Lab Test 11/11/22  0831 11/10/22  1705 11/10/22  0633 11/08/22  0736 11/07/22  0648 11/06/22  1730 11/06/22  0745 11/04/22  0542   NA  --   --   --   --  138  --  136 141   POTASSIUM 4.1 3.8 3.4   < > 3.9 4.5 3.5 4.2   CHLORIDE  --   --   --   --  104  --  99 105   CO2  --   --   --   --  26  --  26 26   BUN  --   --   --   --  10.1  --  13.2 11.8   CR 0.36*  --   --   --  0.46* 0.39* 0.45* 0.50*   ANIONGAP  --   --   --   --  8  --  11 10   MALICK  --   --   --   --  8.3*  --  8.8 8.4*   GLC  --   --   --   --  107*  --  157* 85    < > = values in this interval not displayed.       ASSESSMENT/PLAN:  (R52) Pain  (primary encounter diagnosis)  Comment: ongoing s/p R hip ORIF 11/6/22  Plan: traMADol (ULTRAM) 50 MG tablet       2. Cont. Tylenol  3. PT/OT  4. Contact [facility] to see if able to follow on site  5. Cont. tubigrip stocking to RLE    (D62) Anemia due to blood loss, acute  Comment: s/p ORIF. Last hgb stable. S/p transfusion  Plan: 1. hgb tomorrow  2. Follow O2 sats  3. Monitor BPs, HRs    (R29.898) Weakness of both lower extremities  Comment: ongoing. Pain with wb, ROM RLE  Plan: 1. Cont. 2 assist with  transfers  2. Address pain as above  3. Monitor for attempts to self-transfer    (G30.1,  F02.818) Late onset Alzheimer's dementia with behavioral disturbance (H)  Comment: memory loss. Mood gen stable since return  Plan: 1. Cont. risperdal  2. Monitor for increased anxiety  3. Follow wts, po intake    Electronically signed by:  LAY Wheatley CNP                           Sincerely,        LAY Wheatley CNP

## 2022-11-14 NOTE — PROGRESS NOTES
[facility]  PRIMARY CARE PROVIDER AND CLINIC:  Holden Block, APRN CNP, [address] [facility] / [facility] [address]  Chief Complaint   Patient presents with     Hospital F/U     [facility] Record Number:  [id number]  Place of Service where encounter took place:  [facility] ([place])  [employer] ([facility]) [[id number]]    Jewels Cortez  is a 87 year old  (1935), returned to the above facility from  [facility]. Hospital stay 11/6/22 - 11/11/22. .  Admitted to this facility for  rehab, medical management and nursing care.    HPI:    HPI information obtained from: facility chart records, facility staff, patient report and [facility] chart review.   Brief Summary of Hospital Course:     R hip pain. Fall 11/6/22. FX R hip. ORIF 11/6/22. Returned to AL 11/11/22 to AL. Had ongoing R hip pain. Difficulty with WB. Prn tylenol changed to sched. Had IV dilaudid in hosp.  Pain ongoing with any transfer or ROM RLE.    ABLA: hgb down to 7 s/p ORIF. Transfused with with 1 unit PRBCs. 11/10/22 hgb 9.8. O2 sats stable.     Weakness: assist of 2 for stand/pivot transfers. No WB restrictions. Not currently amb.    Alzheimer's.  Increased anxiety, confusion during hosp. Stay. Cont. On risperdal. Per  Staff, mood gen. Stable.         Updates on Status Since Skilled nursing Admission: decrease in act. Level. Ongoing R hip pain.     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  PAST  SURGICAL HISTORY:   has a past surgical history that includes Hysterectomy; CERVICAL NECK SURGERY; LUMBAR BACK SURGERY; BLADDER LIFT SURGERY; cataract iol, rt/lt (Bilateral, 2020); skin cancer; and Open reduction internal fixation hip nailing (Right, 11/6/2022).  FAMILY HISTORY: family history is not on file.  SOCIAL HISTORY:   reports that she has never smoked. She has never used smokeless tobacco. She reports that she does not currently use alcohol. She reports that she does not use drugs.    Post Discharge Medication Reconciliation Status: discharge medications reconciled and changed, per note/orders    Current Outpatient Medications   Medication Sig Dispense Refill     traMADol (ULTRAM) 50 MG tablet Take 1 tablet (50 mg) by mouth 3 times daily. May also take 1 tablet (50 mg) 2 times daily as needed for severe pain. Do all this for 30 days. 120 tablet 0     acetaminophen (TYLENOL) 32 mg/mL liquid Take 650 mg by mouth every 4 hours as needed for fever or mild pain       risperiDONE (RISPERDAL) 1 MG/ML solution 0.25 mg Bid and bid prn 30 mL 11     senna-docusate (SENOKOT-S/PERICOLACE) 8.6-50 MG tablet Take 1 tablet by mouth 2 times daily as needed for constipation 30 tablet 0     trolamine salicylate (ASPERCREME) 10 % external cream Apply 1 g topically 2 times daily           ROS:  Limited secondary to cognitive impairment but today pt reports some R hip pain    Vitals:  /64   Pulse 78   Resp 16   Wt 47.2 kg (104 lb)   SpO2 93%   BMI 20.31 kg/m    Exam:  GENERAL APPEARANCE:  Alert, in no distress, cooperative  ENT:  Mouth and posterior oropharynx normal, moist mucous membranes, [place]  EYES:  EOM, conjunctivae, lids, pupils and irises normal, PERRL  RESP:  respiratory effort and palpation of chest normal, lungs clear to auscultation , no respiratory distress, diminished breath sounds bibasilar  CV:  Palpation and auscultation of heart done , regular rate and rhythm, no murmur, rub, or gallop, no  edema  ABDOMEN:  normal bowel sounds, soft, nontender, no hepatosplenomegaly or other masses, no guarding or rebound  M/S:   muscle strength 5/5 UEs, gen. LE weakness. pain with ROM, palp. RLE  NEURO:   Cranial nerves 2-12 are normal tested and grossly at patient's baseline, no tremor  PSYCH:  insight and judgement impaired, memory impaired , affect and mood normal    Lab/Diagnostic data:    Most Recent 3 CBC's:Recent Labs   Lab Test 11/10/22  0633 11/09/22  0700 11/08/22  0736 11/07/22  1702 11/07/22  0648 11/06/22  0745   WBC  --   --  9.9  --  8.4 15.9*   HGB 9.8*  --  7.0* 7.3* 7.4* 9.3*   MCV  --   --  88  --  88 86   PLT  --  424 340  --  331 384     Most Recent 3 BMP's:Recent Labs   Lab Test 11/11/22  0831 11/10/22  1705 11/10/22  0633 11/08/22  0736 11/07/22  0648 11/06/22  1730 11/06/22  0745 11/04/22  0542   NA  --   --   --   --  138  --  136 141   POTASSIUM 4.1 3.8 3.4   < > 3.9 4.5 3.5 4.2   CHLORIDE  --   --   --   --  104  --  99 105   CO2  --   --   --   --  26  --  26 26   BUN  --   --   --   --  10.1  --  13.2 11.8   CR 0.36*  --   --   --  0.46* 0.39* 0.45* 0.50*   ANIONGAP  --   --   --   --  8  --  11 10   MALICK  --   --   --   --  8.3*  --  8.8 8.4*   GLC  --   --   --   --  107*  --  157* 85    < > = values in this interval not displayed.       ASSESSMENT/PLAN:  (R52) Pain  (primary encounter diagnosis)  Comment: ongoing s/p R hip ORIF 11/6/22  Plan: traMADol (ULTRAM) 50 MG tablet       2. Cont. Tylenol  3. PT/OT  4. Contact [facility] to see if able to follow on site  5. Cont. tubigrip stocking to RLE    (D62) Anemia due to blood loss, acute  Comment: s/p ORIF. Last hgb stable. S/p transfusion  Plan: 1. hgb tomorrow  2. Follow O2 sats  3. Monitor BPs, HRs    (R29.898) Weakness of both lower extremities  Comment: ongoing. Pain with wb, ROM RLE  Plan: 1. Cont. 2 assist with transfers  2. Address pain as above  3. Monitor for attempts to self-transfer    (G30.1,  F02.818) Late onset  Alzheimer's dementia with behavioral disturbance (H)  Comment: memory loss. Mood gen stable since return  Plan: 1. Cont. risperdal  2. Monitor for increased anxiety  3. Follow wts, po intake    Electronically signed by:  LAY Wheatley CNP

## 2022-11-14 NOTE — DISCHARGE SUMMARY
[facility]  Hospitalist Discharge Summary      Date of Admission:  11/6/2022  Date of Discharge:  11/11/2022  1:25 PM  Discharging Provider: Toyin Locke DO  Discharge Service: Hospitalist Service    Discharge Diagnoses   Fall with fracture of right intertrochanteric femur  S/p right intertrochanteric femur fracture IM nail  S/p cephalomedullary device implantation 11/6/2022  Severe dementia with ? Delerium  Acute on chronic Anemia     Follow-ups Needed After Discharge   Follow-up Appointments     Follow Up and recommended labs and tests      Leave dressing intact for 2 weeks, call if becomes overly saturated   (>50%); do not soak or submerge, ok to shower, reinforce if needed         CBC follow up with PCP/care facility physician.     Discharge Disposition   Discharged to memory care unit with home care   Condition at discharge: Stable      Hospital Course   Ms. Jewels Cortez is a 87 year old lady with dementia who resides in memory care unit, came to [facility] 11/6/2022 after she fell down and had right hip pain and after x-ray of the pelvic was diagnosed with intertrochanteric fracture of the right hip for which she underwent cephalomedullary device 11/6/2022 by Dr. Dave French MD. course was complicated by acute on chronic anemia, globin was stable prior to discharge and actually improving closer to her baseline.  The patient was discharged back to her memory care unit in stable condition with plan otherwise as outlined in this note.     Consultations This Hospital Stay   CARE MANAGEMENT / SOCIAL WORK IP CONSULT  PHYSICAL THERAPY ADULT IP CONSULT  OCCUPATIONAL THERAPY ADULT IP CONSULT  CARE MANAGEMENT / SOCIAL WORK IP CONSULT  PHYSICAL THERAPY ADULT IP CONSULT  OCCUPATIONAL THERAPY ADULT IP CONSULT    Code Status   Prior    Time Spent on this Encounter   Toyin PATIÑO DO, personally saw the patient today and spent greater than 30 minutes discharging this  patient.       Toyin DO Chucky  [facility]  [address]  [facility] [address]  Phone: [phone]  Fax: [phone]  ______________________________________________________________________    Physical Exam   Vital Signs:                    Weight: 112 lbs 0 oz  GENERAL: Patient is not  in acute distress  HEENT: atraumatic, normocephalic   HEART: Regular rate and rhythm,  no murmur appreciated   LUNGS: Respirations are non laboured, Lungs clear to auscultation without wheezing  ABDOMEN: Soft, non tender   CNS: alert, pleasantly confused, does not answer questions appropriately          Primary Care Physician   Holden Block    Discharge Orders      MISCELLANEOUS DME SUPPLY OR ACCESSORY, NOT OTHERWISE SPECIFIED    31 WHEEL CHAIR     Home Care Referral      Primary Care - Care Coordination Referral      Follow Up and recommended labs and tests    Leave dressing intact for 2 weeks, call if becomes overly saturated (>50%); do not soak or submerge, ok to shower, reinforce if needed     Reason for your hospital stay    Right intertrochanteric femur fracture IM nail     Wound care    Site:   Right lateral hip  Instructions:  Leave dressing intact for 2 weeks, call if becomes overly saturated (>50%); do not soak or submerge, ok to shower, reinforce if needed     Activity - Up with nursing assistance     Activity - Up with assistive device     Physical Therapy Adult Consult    Evaluate and treat as clinically indicated.    Reason:  Right intertrochanteric femur fracture IM nail     Occupational Therapy Adult Consult    Evaluate and treat as clinically indicated.    Reason:  Right intertrochanteric femur fracture IM nail     Fall precautions     Crutches DME    DME Documentation: Describe the reason for need to support medical necessity: Impaired gait status post hip surgery. I, the undersigned, certify that the above prescribed supplies are medically necessary for this  patient and is both reasonable and necessary in reference to accepted standards of medical practice in the treatment of this patient's condition and is not prescribed as a convenience.     Cane DME    DME Documentation: Describe the reason for need to support medical necessity: Impaired gait status post hip surgery. I, the undersigned, certify that the above prescribed supplies are medically necessary for this patient and is both reasonable and necessary in reference to accepted standards of medical practice in the treatment of this patient's condition and is not prescribed as a convenience.     Walker DME    : DME Documentation: Describe the reason for need to support medical necessity: Impaired gait status post hip surgery. I, the undersigned, certify that the above prescribed supplies are medically necessary for this patient and is both reasonable and necessary in reference to accepted standards of medical practice in the treatment of this patient's condition and is not prescribed as a convenience.     Diet    Follow this diet upon discharge: Orders Placed This Encounter      Advance Diet as Tolerated: Regular Diet Adult       Significant Results and Procedures   Most Recent 3 CBC's:Recent Labs   Lab Test 11/10/22  0633 11/09/22  0700 11/08/22  0736 11/07/22  1702 11/07/22  0648 11/06/22  0745   WBC  --   --  9.9  --  8.4 15.9*   HGB 9.8*  --  7.0* 7.3* 7.4* 9.3*   MCV  --   --  88  --  88 86   PLT  --  424 340  --  331 384     Most Recent 3 BMP's:Recent Labs   Lab Test 11/11/22  0831 11/10/22  1705 11/10/22  0633 11/08/22  0736 11/07/22  0648 11/06/22  1730 11/06/22  0745 11/04/22  0542   NA  --   --   --   --  138  --  136 141   POTASSIUM 4.1 3.8 3.4   < > 3.9 4.5 3.5 4.2   CHLORIDE  --   --   --   --  104  --  99 105   CO2  --   --   --   --  26  --  26 26   BUN  --   --   --   --  10.1  --  13.2 11.8   CR 0.36*  --   --   --  0.46* 0.39* 0.45* 0.50*   ANIONGAP  --   --   --   --  8  --  11 10   MALICK  --   --    --   --  8.3*  --  8.8 8.4*   GLC  --   --   --   --  107*  --  157* 85    < > = values in this interval not displayed.   ,   Results for orders placed or performed during the hospital encounter of 11/06/22   XR Pelvis w Hip Right 1 View    Narrative    EXAM: XR PELVIS AND HIP RIGHT 1 VIEW  LOCATION: [facility]  DATE/TIME: 11/6/2022 8:37 AM    INDICATION: fall, right hip pain; RLE shortened and externally rotated  COMPARISON: None.      Impression    IMPRESSION: Intertrochanteric fracture of the right hip. No dislocation. Left hip negative for fracture. Pelvis negative for fracture.   XR Hand Left G/E 3 Views    Narrative    EXAM: XR HAND LEFT G/E 3 VIEWS  LOCATION: [facility]  DATE/TIME: 11/06/2022, 9:31 AM    INDICATION: Fall, bruising.  COMPARISON: 12/13/2021.      Impression    IMPRESSION: Interval healing of the fracture of the distal radius seen on the old study. This is healed with some mild residual posttraumatic deformity. There is severe degenerative change at the radioscaphoid articulation as well as the STT joint and   first CMC joint but no acute fractures are identified. Diffuse demineralization.     XR Surgery CHAS L/T 5 Min Fluoro w Stills    Narrative    This exam was marked as non-reportable because it will not be read by a   radiologist or a [place] non-radiologist provider.               Discharge Medications   Discharge Medication List as of 11/11/2022  1:04 PM      CONTINUE these medications which have NOT CHANGED    Details   acetaminophen (TYLENOL) 32 mg/mL liquid Take 650 mg by mouth every 4 hours as needed for fever or mild pain, Historical      risperiDONE (RISPERDAL) 1 MG/ML solution 0.25 mg Bid and bid prn, Disp-30 mL, R-11, E-Prescribe      trolamine salicylate (ASPERCREME) 10 % external cream Apply 1 g topically 2 times dailyHistorical      senna-docusate (SENOKOT-S/PERICOLACE) 8.6-50 MG tablet Take 1 tablet by mouth 2 times daily as  needed for constipation, Disp-30 tablet, R-0, OTC           Allergies   Allergies   Allergen Reactions     Penicillins Unknown     Sulfa Drugs Unknown

## 2022-11-17 NOTE — LETTER
11/17/2022        RE: [facility]  [address]  [facility] [address]        [place] [facility]  [place] Medical Record Number:  [id number]  Place of Service where encounter took place:  [facility] [employer]  [facility] ([facility]) [[id number]]  Chief Complaint   Patient presents with     Pain       HPI:    Jewels Cortez  is a 87 year old (1935), who is being seen today for an episodic care visit.  HPI information obtained from: facility chart records, facility staff, patient report and [facility] chart review. Today's concern is: pain, weakness, alzheimer's. S/p fall R hip fx 11/6/22. Had ORIF R hip. Returned to AL with prn tylenol-not effective. 11/14/22 started on sched, prn tramadol which has been effective. Pain better controlled. Able to transfer with 1 assist now. Has PT ordered for LE weakness. For alzheimer's cont on risperdal. Ongoing confusion. Mood, behaviors gen. Stable past few days.       Past Medical and Surgical History reviewed in Epic today.    MEDICATIONS:    Current Outpatient Medications   Medication Sig Dispense Refill     acetaminophen (TYLENOL) 32 mg/mL liquid Take 650 mg by mouth every 4 hours as needed for fever or mild pain       risperiDONE (RISPERDAL) 1 MG/ML solution 0.25 mg Bid and bid prn 30 mL 11     senna-docusate (SENOKOT-S/PERICOLACE) 8.6-50 MG tablet Take 1 tablet by mouth 2 times daily as needed for constipation 30 tablet 0     traMADol (ULTRAM) 50 MG tablet Take 1 tablet (50 mg) by mouth 3 times daily. May also take 1 tablet (50 mg) 2 times daily as needed for severe pain. Do all this for 30 days. 120 tablet 0     trolamine salicylate (ASPERCREME) 10 % external cream Apply 1 g topically 2 times daily           REVIEW OF SYSTEMS:  Limited secondary to cognitive impairment but today pt reports some R hip pain    Objective:  /64   Pulse 65   Resp 16   Ht 1.524 m (5')   SpO2 93%   BMI 20.31 kg/m    Exam:  GENERAL  APPEARANCE:  Alert, in no distress  ENT:  Mouth and posterior oropharynx normal, moist mucous membranes, [place]  EYES:  EOM, conjunctivae, lids, pupils and irises normal, PERRL  RESP:  respiratory effort and palpation of chest normal, lungs clear to auscultation , no respiratory distress  CV:  Palpation and auscultation of heart done , regular rate and rhythm, no murmur, rub, or gallop, peripheral edema trace+ in LEs  ABDOMEN:  normal bowel sounds, soft, nontender, no hepatosplenomegaly or other masses, no guarding or rebound  M/S:   muscle strength 5/5 UEs, gen. LE weakness. normal muscle tone.   NEURO:   Cranial nerves 2-12 are normal tested and grossly at patient's baseline, speech clear  PSYCH:  insight and judgement impaired, memory impaired , affect and mood normal    Labs:     Most Recent 3 CBC's:Recent Labs   Lab Test 11/15/22  1359 11/10/22  0633 11/09/22  0700 11/08/22  0736 11/07/22  1702 11/07/22  0648 11/06/22  0745   WBC  --   --   --  9.9  --  8.4 15.9*   HGB 9.5* 9.8*  --  7.0*   < > 7.4* 9.3*   MCV  --   --   --  88  --  88 86   PLT  --   --  424 340  --  331 384    < > = values in this interval not displayed.     Most Recent 3 BMP's:Recent Labs   Lab Test 11/11/22  0831 11/10/22  1705 11/10/22  0633 11/08/22  0736 11/07/22  0648 11/06/22  1730 11/06/22  0745 11/04/22  0542   NA  --   --   --   --  138  --  136 141   POTASSIUM 4.1 3.8 3.4   < > 3.9 4.5 3.5 4.2   CHLORIDE  --   --   --   --  104  --  99 105   CO2  --   --   --   --  26  --  26 26   BUN  --   --   --   --  10.1  --  13.2 11.8   CR 0.36*  --   --   --  0.46* 0.39* 0.45* 0.50*   ANIONGAP  --   --   --   --  8  --  11 10   MALICK  --   --   --   --  8.3*  --  8.8 8.4*   GLC  --   --   --   --  107*  --  157* 85    < > = values in this interval not displayed.       ASSESSMENT/PLAN:  (R52) Pain  (primary encounter diagnosis)  Comment: S/p R hip ORIF. Pain improved  Plan: 1.cont. tramadol  2. Cont. Prn tylenol  3. Reassess pain over next week, if  stable, may decrease tramadol to prn    (R29.898) Weakness of both lower extremities  Comment: improved transfers since tramadol started. Not amb.  Plan: 1. PT to start tomorrow  2. Cont. 1-2 A with transfers  3. Monitor for attempts to self-transfer    (G30.1,  F02.818) Late onset Alzheimer's dementia with behavioral disturbance (H)  Comment: ongoing confusion. Mood, behaviors gen. stable  Plan: 1. Cont. risperdal  2. Monitor for delusions, paranoia  3. OT to start     Electronically signed by:  LAY Wheatley CNP                 Sincerely,        LAY Wheatley CNP

## 2022-11-17 NOTE — PROGRESS NOTES
[place] [facility]  [place] Medical Record Number:  [id number]  Place of Service where encounter took place:  [facility]  [facility] ([facility]) [[id number]]  Chief Complaint   Patient presents with     Pain       HPI:    Jewels Cortez  is a 87 year old (1935), who is being seen today for an episodic care visit.  HPI information obtained from: facility chart records, facility staff, patient report and [facility] chart review. Today's concern is: pain, weakness, alzheimer's. S/p fall R hip fx 11/6/22. Had ORIF R hip. Returned to AL with prn tylenol-not effective. 11/14/22 started on sched, prn tramadol which has been effective. Pain better controlled. Able to transfer with 1 assist now. Has PT ordered for LE weakness. For alzheimer's cont on risperdal. Ongoing confusion. Mood, behaviors gen. Stable past few days.       Past Medical and Surgical History reviewed in Epic today.    MEDICATIONS:    Current Outpatient Medications   Medication Sig Dispense Refill     acetaminophen (TYLENOL) 32 mg/mL liquid Take 650 mg by mouth every 4 hours as needed for fever or mild pain       risperiDONE (RISPERDAL) 1 MG/ML solution 0.25 mg Bid and bid prn 30 mL 11     senna-docusate (SENOKOT-S/PERICOLACE) 8.6-50 MG tablet Take 1 tablet by mouth 2 times daily as needed for constipation 30 tablet 0     traMADol (ULTRAM) 50 MG tablet Take 1 tablet (50 mg) by mouth 3 times daily. May also take 1 tablet (50 mg) 2 times daily as needed for severe pain. Do all this for 30 days. 120 tablet 0     trolamine salicylate (ASPERCREME) 10 % external cream Apply 1 g topically 2 times daily           REVIEW OF SYSTEMS:  Limited secondary to cognitive impairment but today pt reports some R hip pain    Objective:  /64   Pulse 65   Resp 16   Ht 1.524 m (5')   SpO2 93%   BMI 20.31 kg/m    Exam:  GENERAL APPEARANCE:  Alert, in no distress  ENT:  Mouth and posterior oropharynx normal, moist mucous membranes, [place]  EYES:   EOM, conjunctivae, lids, pupils and irises normal, PERRL  RESP:  respiratory effort and palpation of chest normal, lungs clear to auscultation , no respiratory distress  CV:  Palpation and auscultation of heart done , regular rate and rhythm, no murmur, rub, or gallop, peripheral edema trace+ in LEs  ABDOMEN:  normal bowel sounds, soft, nontender, no hepatosplenomegaly or other masses, no guarding or rebound  M/S:   muscle strength 5/5 UEs, gen. LE weakness. normal muscle tone.   NEURO:   Cranial nerves 2-12 are normal tested and grossly at patient's baseline, speech clear  PSYCH:  insight and judgement impaired, memory impaired , affect and mood normal    Labs:     Most Recent 3 CBC's:Recent Labs   Lab Test 11/15/22  1359 11/10/22  0633 11/09/22  0700 11/08/22  0736 11/07/22  1702 11/07/22  0648 11/06/22  0745   WBC  --   --   --  9.9  --  8.4 15.9*   HGB 9.5* 9.8*  --  7.0*   < > 7.4* 9.3*   MCV  --   --   --  88  --  88 86   PLT  --   --  424 340  --  331 384    < > = values in this interval not displayed.     Most Recent 3 BMP's:Recent Labs   Lab Test 11/11/22  0831 11/10/22  1705 11/10/22  0633 11/08/22  0736 11/07/22  0648 11/06/22  1730 11/06/22  0745 11/04/22  0542   NA  --   --   --   --  138  --  136 141   POTASSIUM 4.1 3.8 3.4   < > 3.9 4.5 3.5 4.2   CHLORIDE  --   --   --   --  104  --  99 105   CO2  --   --   --   --  26  --  26 26   BUN  --   --   --   --  10.1  --  13.2 11.8   CR 0.36*  --   --   --  0.46* 0.39* 0.45* 0.50*   ANIONGAP  --   --   --   --  8  --  11 10   MALICK  --   --   --   --  8.3*  --  8.8 8.4*   GLC  --   --   --   --  107*  --  157* 85    < > = values in this interval not displayed.       ASSESSMENT/PLAN:  (R52) Pain  (primary encounter diagnosis)  Comment: S/p R hip ORIF. Pain improved  Plan: 1.cont. tramadol  2. Cont. Prn tylenol  3. Reassess pain over next week, if stable, may decrease tramadol to prn    (R23.650) Weakness of both lower extremities  Comment: improved transfers  since tramadol started. Not amb.  Plan: 1. PT to start tomorrow  2. Cont. 1-2 A with transfers  3. Monitor for attempts to self-transfer    (G30.1,  F02.818) Late onset Alzheimer's dementia with behavioral disturbance (H)  Comment: ongoing confusion. Mood, behaviors gen. stable  Plan: 1. Cont. risperdal  2. Monitor for delusions, paranoia  3. OT to start     Electronically signed by:  LAY Wheatley CNP

## 2022-11-21 NOTE — LETTER
11/21/2022        RE: [facility]  [address]  [address]  [facility] [address]        [place] [facility]  [place] Medical Record Number:  [id number]  Place of Service where encounter took place:  [facility]  [employer] ([facility]) [[id number]]  Chief Complaint   Patient presents with     Pain       HPI:    Jewels Cortez  is a 87 year old (1935), who is being seen today for an episodic care visit.  HPI information obtained from: facility chart records, facility staff, patient report and [facility] chart review. Today's concern is: pain, weakness, alzheimer's. S/p R hip surg. Had R hip pain upon return, unable to transfer with assist. WBAT to RLE. Started on tramadol which has been effective. Working with PT. Now is a stand/pivot transfer with 1 assist. Not currently amb. With staff. Has had increased confusion at times. Some decrease in po intake. Cont. On risperdal for alzheimer's.  No reports of insomnia.       Past Medical and Surgical History reviewed in Epic today.    MEDICATIONS:    Current Outpatient Medications   Medication Sig Dispense Refill     acetaminophen (TYLENOL) 32 mg/mL liquid Take 650 mg by mouth every 4 hours as needed for fever or mild pain       risperiDONE (RISPERDAL) 1 MG/ML solution 0.25 mg Bid and bid prn 30 mL 11     senna-docusate (SENOKOT-S/PERICOLACE) 8.6-50 MG tablet Take 1 tablet by mouth 2 times daily as needed for constipation 30 tablet 0     traMADol (ULTRAM) 50 MG tablet Take 1 tablet (50 mg) by mouth 3 times daily. May also take 1 tablet (50 mg) 2 times daily as needed for severe pain. Do all this for 30 days. 120 tablet 0     trolamine salicylate (ASPERCREME) 10 % external cream Apply 1 g topically 2 times daily           REVIEW OF SYSTEMS:  Limited secondary to cognitive impairment but today pt reports some R hip pain with removal of staple today    Objective:  /74   Pulse 69   Resp 16   Ht 1.524 m (5')   SpO2 93%    BMI 20.31 kg/m    Exam:  GENERAL APPEARANCE:  in no distress, sleepy  ENT:  Mouth and posterior oropharynx normal, moist mucous membranes, [place]  EYES:  EOM, conjunctivae, lids, pupils and irises normal, PERRL  RESP:  respiratory effort and palpation of chest normal, lungs clear to auscultation , no respiratory distress, diminished breath sounds bibasilar  CV:  Palpation and auscultation of heart done , regular rate and rhythm, no murmur, rub, or gallop, peripheral edema trace+ in LEs  ABDOMEN:  normal bowel sounds, soft, nontender, no hepatosplenomegaly or other masses, no guarding or rebound  M/S:   muscle strength 5/5 UEs, gen. LE weakness, severe kyphosis  SKIN:  R hip incision sites stable. staples out today. no drainage, faint bruising at site  NEURO:   Cranial nerves 2-12 are normal tested and grossly at patient's baseline, no tremor  PSYCH:  insight and judgement impaired, memory impaired , affect abnormal -flat    Labs:     Most Recent 3 CBC's:Recent Labs   Lab Test 11/15/22  1359 11/10/22  0633 11/09/22  0700 11/08/22  0736 11/07/22  1702 11/07/22  0648 11/06/22  0745   WBC  --   --   --  9.9  --  8.4 15.9*   HGB 9.5* 9.8*  --  7.0*   < > 7.4* 9.3*   MCV  --   --   --  88  --  88 86   PLT  --   --  424 340  --  331 384    < > = values in this interval not displayed.     Most Recent 3 BMP's:Recent Labs   Lab Test 11/11/22  0831 11/10/22  1705 11/10/22  0633 11/08/22  0736 11/07/22  0648 11/06/22  1730 11/06/22  0745 11/04/22  0542   NA  --   --   --   --  138  --  136 141   POTASSIUM 4.1 3.8 3.4   < > 3.9 4.5 3.5 4.2   CHLORIDE  --   --   --   --  104  --  99 105   CO2  --   --   --   --  26  --  26 26   BUN  --   --   --   --  10.1  --  13.2 11.8   CR 0.36*  --   --   --  0.46* 0.39* 0.45* 0.50*   ANIONGAP  --   --   --   --  8  --  11 10   MALICK  --   --   --   --  8.3*  --  8.8 8.4*   GLC  --   --   --   --  107*  --  157* 85    < > = values in this interval not displayed.       ASSESSMENT/PLAN:  (R52) Pain   (primary encounter diagnosis)  Comment: gen. Stable.   Plan: 1. Cont. Sched, prn tramadol  2. Cont. Prn tylenol  3. Reassess over next few days, if stable may decrease tramadol dose or change to prn  4. Repeat XR R hip today, to be evaluated by [facility]    (R27.955) Weakness of both lower extremities  Comment: improved transfers. Working with PT  Plan: 1. Address pain as above  2. Cont. PT  3. Encourage amb, wb activities as roseann    (G30.1,  F02.818) Late onset Alzheimer's dementia with behavioral disturbance (H)  Comment: memory loss. Some recent decrease in po intake, unclear if r/t tramadol  Plan: 1. Cont. risperdal  2. Plan to taper tramadol as above  3. Follow po intake    Electronically signed by:  LAY Wheatley CNP                 Sincerely,        LAY Wheatley CNP

## 2022-11-21 NOTE — TELEPHONE ENCOUNTER
Ortho Nursing home visit    Jewels Cortez is a 87 year old female who resides at [facility]    Patient is seen today for incision check with NP and staff, also staple removal, IM nail 2nd to hip fracture.      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     Bilateral sensorineural hearing loss wears hearing aides 5/22/2020     Cancer (H)     skin cancer forehead and earlobe     Cataract 2019    bilateral with lens implants     Dementia associated with other underlying disease with behavioral disturbance 5/23/2020     Memory loss      Onychomycosis due to dermatophyte 5/23/2020     Right leg swelling 5/22/2020      Past Surgical History:   Procedure Laterality Date     BLADDER LIFT SURGERY       CATARACT IOL, RT/LT Bilateral 2020    surgery done in [place]     CERVICAL NECK SURGERY       HYSTERECTOMY       LUMBAR BACK SURGERY       OPEN REDUCTION INTERNAL FIXATION HIP NAILING Right 11/6/2022    Procedure: Surgical treatment of right intertrochanteric femur fracture with cephalomedullary device;  Surgeon: Dave French MD;  Location: [facility] OR     skin cancer      removal of earlobe and face        Allergies   Allergen Reactions     Penicillins Unknown     Sulfa Drugs Unknown      There were no vitals taken for this visit.     Exam: Per NP all incisions healing well, staples removed, new drsg applied, discussed WBAT with walker, and new imaging done at 6 weeks post-op    Will forward x-rays to Dr. French [facility] for review.        [id number]  JSunil \[facility]\""-C  [phone] Cell

## 2022-11-21 NOTE — PROGRESS NOTES
[place] [facility]  [place] Medical Record Number:  [id number]  Place of Service where encounter took place:  [facility]  [facility] ([facility]) [[id number]]  Chief Complaint   Patient presents with     Pain       HPI:    Jewels Cortez  is a 87 year old (1935), who is being seen today for an episodic care visit.  HPI information obtained from: facility chart records, facility staff, patient report and [facility] chart review. Today's concern is: pain, weakness, alzheimer's. S/p R hip surg. Had R hip pain upon return, unable to transfer with assist. WBAT to RLE. Started on tramadol which has been effective. Working with PT. Now is a stand/pivot transfer with 1 assist. Not currently amb. With staff. Has had increased confusion at times. Some decrease in po intake. Cont. On risperdal for alzheimer's.  No reports of insomnia.       Past Medical and Surgical History reviewed in Epic today.    MEDICATIONS:    Current Outpatient Medications   Medication Sig Dispense Refill     acetaminophen (TYLENOL) 32 mg/mL liquid Take 650 mg by mouth every 4 hours as needed for fever or mild pain       risperiDONE (RISPERDAL) 1 MG/ML solution 0.25 mg Bid and bid prn 30 mL 11     senna-docusate (SENOKOT-S/PERICOLACE) 8.6-50 MG tablet Take 1 tablet by mouth 2 times daily as needed for constipation 30 tablet 0     traMADol (ULTRAM) 50 MG tablet Take 1 tablet (50 mg) by mouth 3 times daily. May also take 1 tablet (50 mg) 2 times daily as needed for severe pain. Do all this for 30 days. 120 tablet 0     trolamine salicylate (ASPERCREME) 10 % external cream Apply 1 g topically 2 times daily           REVIEW OF SYSTEMS:  Limited secondary to cognitive impairment but today pt reports some R hip pain with removal of staple today    Objective:  /74   Pulse 69   Resp 16   Ht 1.524 m (5')   SpO2 93%   BMI 20.31 kg/m    Exam:  GENERAL APPEARANCE:  in no distress, sleepy  ENT:  Mouth and posterior oropharynx  normal, moist mucous membranes, [place]  EYES:  EOM, conjunctivae, lids, pupils and irises normal, PERRL  RESP:  respiratory effort and palpation of chest normal, lungs clear to auscultation , no respiratory distress, diminished breath sounds bibasilar  CV:  Palpation and auscultation of heart done , regular rate and rhythm, no murmur, rub, or gallop, peripheral edema trace+ in LEs  ABDOMEN:  normal bowel sounds, soft, nontender, no hepatosplenomegaly or other masses, no guarding or rebound  M/S:   muscle strength 5/5 UEs, gen. LE weakness, severe kyphosis  SKIN:  R hip incision sites stable. staples out today. no drainage, faint bruising at site  NEURO:   Cranial nerves 2-12 are normal tested and grossly at patient's baseline, no tremor  PSYCH:  insight and judgement impaired, memory impaired , affect abnormal -flat    Labs:     Most Recent 3 CBC's:Recent Labs   Lab Test 11/15/22  1359 11/10/22  0633 11/09/22  0700 11/08/22  0736 11/07/22  1702 11/07/22  0648 11/06/22  0745   WBC  --   --   --  9.9  --  8.4 15.9*   HGB 9.5* 9.8*  --  7.0*   < > 7.4* 9.3*   MCV  --   --   --  88  --  88 86   PLT  --   --  424 340  --  331 384    < > = values in this interval not displayed.     Most Recent 3 BMP's:Recent Labs   Lab Test 11/11/22  0831 11/10/22  1705 11/10/22  0633 11/08/22  0736 11/07/22  0648 11/06/22  1730 11/06/22  0745 11/04/22  0542   NA  --   --   --   --  138  --  136 141   POTASSIUM 4.1 3.8 3.4   < > 3.9 4.5 3.5 4.2   CHLORIDE  --   --   --   --  104  --  99 105   CO2  --   --   --   --  26  --  26 26   BUN  --   --   --   --  10.1  --  13.2 11.8   CR 0.36*  --   --   --  0.46* 0.39* 0.45* 0.50*   ANIONGAP  --   --   --   --  8  --  11 10   MALICK  --   --   --   --  8.3*  --  8.8 8.4*   GLC  --   --   --   --  107*  --  157* 85    < > = values in this interval not displayed.       ASSESSMENT/PLAN:  (R52) Pain  (primary encounter diagnosis)  Comment: gen. Stable.   Plan: 1. Cont. Sched, prn tramadol  2. Cont. Prn  tylenol  3. Reassess over next few days, if stable may decrease tramadol dose or change to prn  4. Repeat XR R hip today, to be evaluated by [facility]    (R23.313) Weakness of both lower extremities  Comment: improved transfers. Working with PT  Plan: 1. Address pain as above  2. Cont. PT  3. Encourage amb, wb activities as roseann    (G30.1,  F02.818) Late onset Alzheimer's dementia with behavioral disturbance (H)  Comment: memory loss. Some recent decrease in po intake, unclear if r/t tramadol  Plan: 1. Cont. risperdal  2. Plan to taper tramadol as above  3. Follow po intake    Electronically signed by:  LAY Wheatley CNP

## 2022-11-22 NOTE — TELEPHONE ENCOUNTER
[facility] En[facility]    Provider: LAY Soria CNP   Facility: [facility]  Facility Type:  AL    Caller: Lesia  Call Back Number: [phone]    Allergies:    Allergies   Allergen Reactions     Penicillins Unknown     Sulfa Drugs Unknown        Reason for call: Nurse is reporting that patient has been very sleepy lately.  They also are noting excoriation to her external vagina.  Patient is receiving Tramadol 50mg TID and 50mg BID PRN.  VS:  T=97.7, P=85, R=20, ZJ=586/62, O2=90%RA.      Verbal Order/Direction given by Provider: Discontinue scheduled Tramadol.  Change PRN Tramadol to 25mg Q 6 hours PRN.  Nystatin cream(100,000 units/g)---apply to external vagina BID x 7 days.  Update primary NP on 11/28/22.      Provider giving Order:  LAY Soria CNP     Verbal Order given to: left message on nursing voicemail.    Jerry Bright RN

## 2022-11-28 NOTE — PROGRESS NOTES
[place] [facility]  [place] Medical Record Number:  [id number]  Place of Service where encounter took place:  [facility]  [facility] ([facility]) [[id number]]  Chief Complaint   Patient presents with     Fatigue       HPI:    Jewels Cortez  is a 87 year old (1935), who is being seen today for an episodic care visit.  HPI information obtained from: facility chart records, facility staff, patient report and [facility] chart review. Today's concern is: fatigue, pain, alzheimer's. S/p R ORIF earlier this month. Has had LE weakness, not amb. Cont. With PT. Started on tramadol for pain which has been effective.  Last week had increase in lethargy, increased sleepiness. Sched. Tramadol discontinue'd. Started on lower dose of prn tramadol.  Has had increase in daytime alertness, increase in po intake. No reports of increased pain. For alzheimer's cont. On risperdal. Ongoing confusion at times, no recent reports of paranoia. Per staff did have episode of getting OOB, amb to bathroom.       Past Medical and Surgical History reviewed in Epic today.    MEDICATIONS:    Current Outpatient Medications   Medication Sig Dispense Refill     acetaminophen (TYLENOL) 32 mg/mL liquid Take 650 mg by mouth every 4 hours as needed for fever or mild pain       nystatin (MYCOSTATIN) 651714 UNIT/GM external cream Apply 1 Application topically 2 times daily X 7 days(apply to external vagina)       risperiDONE (RISPERDAL) 1 MG/ML solution 0.25 mg Bid and bid prn 30 mL 11     senna-docusate (SENOKOT-S/PERICOLACE) 8.6-50 MG tablet Take 1 tablet by mouth 2 times daily as needed for constipation 30 tablet 0     traMADol (ULTRAM) 50 MG tablet Take 0.5 tablets (25 mg) by mouth every 6 hours as needed 62 tablet 1     trolamine salicylate (ASPERCREME) 10 % external cream Apply 1 g topically 2 times daily           REVIEW OF SYSTEMS:  Limited secondary to cognitive impairment but today pt reports no current R hip  pain    Objective:  /61   Pulse 63   Resp 16   SpO2 92%   Exam:  GENERAL APPEARANCE:  Alert, in no distress  ENT:  Mouth and posterior oropharynx normal, moist mucous membranes, [place]  EYES:  EOM, conjunctivae, lids, pupils and irises normal, PERRL  NECK:  neck flexion  RESP:  respiratory effort and palpation of chest normal, lungs clear to auscultation , no respiratory distress, diminished breath sounds bibasilar  CV:  Palpation and auscultation of heart done , regular rate and rhythm, no murmur, rub, or gallop, no edema  ABDOMEN:  normal bowel sounds, soft, nontender, no hepatosplenomegaly or other masses, no guarding or rebound  M/S:   muscle strength 5/5 UEs, gen. LE weakness, severe kyphosis.   SKIN:  R hip incision sites mostly healed.  NEURO:   Cranial nerves 2-12 are normal tested and grossly at patient's baseline, no tremor  PSYCH:  insight and judgement impaired, memory impaired , affect abnormal -flat    Labs:     Most Recent 3 BMP's:Recent Labs   Lab Test 11/11/22  0831 11/10/22  1705 11/10/22  0633 11/08/22  0736 11/07/22  0648 11/06/22  1730 11/06/22  0745 11/04/22  0542   NA  --   --   --   --  138  --  136 141   POTASSIUM 4.1 3.8 3.4   < > 3.9 4.5 3.5 4.2   CHLORIDE  --   --   --   --  104  --  99 105   CO2  --   --   --   --  26  --  26 26   BUN  --   --   --   --  10.1  --  13.2 11.8   CR 0.36*  --   --   --  0.46* 0.39* 0.45* 0.50*   ANIONGAP  --   --   --   --  8  --  11 10   MALICK  --   --   --   --  8.3*  --  8.8 8.4*   GLC  --   --   --   --  107*  --  157* 85    < > = values in this interval not displayed.       ASSESSMENT/PLAN:  (R54.64) Fatigue, unspecified type  (primary encounter diagnosis)  Comment: improved.   Plan: 1. Cont. Decreased prn tramadol dose  2. Repeat hgb tomorrow-had ABLA s/p R hip ORIF  3. Monitor for reports of insomnia  4. Monitor po intake    (R52) Pain  Comment: currently stable.  Plan: 1. Prn tramadol as above  2. Cont. Tylenol  3. Cont. PT, if no pain with PT,  may discontinue prn tramadol    (G30.1,  F02.818) Late onset Alzheimer's dementia with behavioral disturbance (H)  Comment: memory loss. Confusion. Recent episode of self transfer  Plan: 1. Cont. Sched, prn risperdal  2. Monitor for delusions, paranoia  3. Monitor for functional decline      Electronically signed by:  LAY Wheatley CNP

## 2022-11-28 NOTE — LETTER
11/28/2022        RE: [facility]  [address]  [address]  [facility] [address]        [place] [facility]  [place] Medical Record Number:  [id number]  Place of Service where encounter took place:  [facility]  [place] ([facility]) [[id number]]  Chief Complaint   Patient presents with     Fatigue       HPI:    Jewels Cortez  is a 87 year old (1935), who is being seen today for an episodic care visit.  HPI information obtained from: facility chart records, facility staff, patient report and [facility] chart review. Today's concern is: fatigue, pain, alzheimer's. S/p R ORIF earlier this month. Has had LE weakness, not amb. Cont. With PT. Started on tramadol for pain which has been effective.  Last week had increase in lethargy, increased sleepiness. Sched. Tramadol discontinue'd. Started on lower dose of prn tramadol.  Has had increase in daytime alertness, increase in po intake. No reports of increased pain. For alzheimer's cont. On risperdal. Ongoing confusion at times, no recent reports of paranoia. Per staff did have episode of getting OOB, amb to bathroom.       Past Medical and Surgical History reviewed in Epic today.    MEDICATIONS:    Current Outpatient Medications   Medication Sig Dispense Refill     acetaminophen (TYLENOL) 32 mg/mL liquid Take 650 mg by mouth every 4 hours as needed for fever or mild pain       nystatin (MYCOSTATIN) 310985 UNIT/GM external cream Apply 1 Application topically 2 times daily X 7 days(apply to external vagina)       risperiDONE (RISPERDAL) 1 MG/ML solution 0.25 mg Bid and bid prn 30 mL 11     senna-docusate (SENOKOT-S/PERICOLACE) 8.6-50 MG tablet Take 1 tablet by mouth 2 times daily as needed for constipation 30 tablet 0     traMADol (ULTRAM) 50 MG tablet Take 0.5 tablets (25 mg) by mouth every 6 hours as needed 62 tablet 1     trolamine salicylate (ASPERCREME) 10 % external cream Apply 1 g topically 2 times daily            REVIEW OF SYSTEMS:  Limited secondary to cognitive impairment but today pt reports no current R hip pain    Objective:  /61   Pulse 63   Resp 16   SpO2 92%   Exam:  GENERAL APPEARANCE:  Alert, in no distress  ENT:  Mouth and posterior oropharynx normal, moist mucous membranes, [place]  EYES:  EOM, conjunctivae, lids, pupils and irises normal, PERRL  NECK:  neck flexion  RESP:  respiratory effort and palpation of chest normal, lungs clear to auscultation , no respiratory distress, diminished breath sounds bibasilar  CV:  Palpation and auscultation of heart done , regular rate and rhythm, no murmur, rub, or gallop, no edema  ABDOMEN:  normal bowel sounds, soft, nontender, no hepatosplenomegaly or other masses, no guarding or rebound  M/S:   muscle strength 5/5 UEs, gen. LE weakness, severe kyphosis.   SKIN:  R hip incision sites mostly healed.  NEURO:   Cranial nerves 2-12 are normal tested and grossly at patient's baseline, no tremor  PSYCH:  insight and judgement impaired, memory impaired , affect abnormal -flat    Labs:     Most Recent 3 BMP's:Recent Labs   Lab Test 11/11/22  0831 11/10/22  1705 11/10/22  0633 11/08/22  0736 11/07/22  0648 11/06/22  1730 11/06/22  0745 11/04/22  0542   NA  --   --   --   --  138  --  136 141   POTASSIUM 4.1 3.8 3.4   < > 3.9 4.5 3.5 4.2   CHLORIDE  --   --   --   --  104  --  99 105   CO2  --   --   --   --  26  --  26 26   BUN  --   --   --   --  10.1  --  13.2 11.8   CR 0.36*  --   --   --  0.46* 0.39* 0.45* 0.50*   ANIONGAP  --   --   --   --  8  --  11 10   MALICK  --   --   --   --  8.3*  --  8.8 8.4*   GLC  --   --   --   --  107*  --  157* 85    < > = values in this interval not displayed.       ASSESSMENT/PLAN:  (R5.67) Fatigue, unspecified type  (primary encounter diagnosis)  Comment: improved.   Plan: 1. Cont. Decreased prn tramadol dose  2. Repeat hgb tomorrow-had ABLA s/p R hip ORIF  3. Monitor for reports of insomnia  4. Monitor po intake    (R52)  Pain  Comment: currently stable.  Plan: 1. Prn tramadol as above  2. Cont. Tylenol  3. Cont. PT, if no pain with PT, may discontinue prn tramadol    (G30.1,  F02.818) Late onset Alzheimer's dementia with behavioral disturbance (H)  Comment: memory loss. Confusion. Recent episode of self transfer  Plan: 1. Cont. Sched, prn risperdal  2. Monitor for delusions, paranoia  3. Monitor for functional decline      Electronically signed by:  LAY Wheatley CNP                 Sincerely,        LAY Wheatley CNP

## 2022-12-05 NOTE — LETTER
12/5/2022        RE: [facility]  [address]  [address]  [facility] [address]        [place] [facility]  [place] Medical Record Number:  [id number]  Place of Service where encounter took place:  [facility]  [employer] ([facility]) [[id number]]  Chief Complaint   Patient presents with     Pain       HPI:    Jewels Cortez  is a 87 year old (1935), who is being seen today for an episodic care visit.  HPI information obtained from: facility chart records, facility staff, patient report and [facility] chart review. Today's concern is: pain, weakness, alzheimer's.  S/p R hip fx. Cont. On prn tramadol for pain. Noted to have increase daytime sleepiness, decrease in po intake. tamadol changed to prn, has had am risperdal dose on hold. Cont. Qpm, prn risperdal. Tearful at times. Gen. More alert during the day, has had improved po intake. Uses w.c. is amb. To amb. Uses walker. Cont. With therapies.       Past Medical and Surgical History reviewed in Epic today.    MEDICATIONS:    Current Outpatient Medications   Medication Sig Dispense Refill     acetaminophen (TYLENOL) 32 mg/mL liquid Take 650 mg by mouth every 4 hours as needed for fever or mild pain       risperiDONE (RISPERDAL) 1 MG/ML solution 0.25 mg Bid and bid prn 30 mL 11     senna-docusate (SENOKOT-S/PERICOLACE) 8.6-50 MG tablet Take 1 tablet by mouth 2 times daily as needed for constipation 30 tablet 0     SM ARTHRICREAM RUB 10 % external cream APPLY 1GM TOPICALLY TO RIGHT KNEE TWICE DAILY 85 g PRN     traMADol (ULTRAM) 50 MG tablet Take 0.5 tablets (25 mg) by mouth every 6 hours as needed 62 tablet 1         REVIEW OF SYSTEMS:  Limited secondary to cognitive impairment but today pt reports feeling ok    Objective:  /66   Pulse 71   Temp 97.4  F (36.3  C)   Resp 18   SpO2 94%   Exam:  GENERAL APPEARANCE:  in no distress, cooperative  ENT:  Mouth and posterior oropharynx normal, moist mucous membranes,  [place]  EYES:  EOM, conjunctivae, lids, pupils and irises normal, PERRL  NECK:  flexion  RESP:  respiratory effort and palpation of chest normal, lungs clear to auscultation , no respiratory distress, diminished breath sounds bibasilar  CV:  Palpation and auscultation of heart done , regular rate and rhythm, no murmur, rub, or gallop, no edema  ABDOMEN:  normal bowel sounds, soft, nontender, no hepatosplenomegaly or other masses, no guarding or rebound  M/S:   muscle strength 5/5 UEs, gen. LE weakness. severe kyphosis  NEURO:   Cranial nerves 2-12 are normal tested and grossly at patient's baseline, no tremor  PSYCH:  insight and judgement impaired, memory impaired , affect abnormal -flat    Labs:     Most Recent 3 BMP's:Recent Labs   Lab Test 11/11/22  0831 11/10/22  1705 11/10/22  0633 11/08/22  0736 11/07/22  0648 11/06/22  1730 11/06/22  0745 11/04/22  0542   NA  --   --   --   --  138  --  136 141   POTASSIUM 4.1 3.8 3.4   < > 3.9 4.5 3.5 4.2   CHLORIDE  --   --   --   --  104  --  99 105   CO2  --   --   --   --  26  --  26 26   BUN  --   --   --   --  10.1  --  13.2 11.8   CR 0.36*  --   --   --  0.46* 0.39* 0.45* 0.50*   ANIONGAP  --   --   --   --  8  --  11 10   MALICK  --   --   --   --  8.3*  --  8.8 8.4*   GLC  --   --   --   --  107*  --  157* 85    < > = values in this interval not displayed.       ASSESSMENT/PLAN:  (R52) Pain  (primary encounter diagnosis)  Comment: gen. Stable. Difficult to assess given alzheimer's.   Plan: 1. Cont. Tylenol  2. Cont. Prn tramadol  3. Monitor for non-verbal s/s pain  4. Reassess tramadol use over next couple weeks-if not using, discontinue.    (R27.936) Weakness of both lower extremities  Comment: some increased in amb. Improved LE strength . Severe kyphosis  Plan: 1. Address pain as above  2. Encourage amb. With walker as roseann  3. Encourage to come out of apt during the day for activities  4. Monitor for skin breakdown    (G30.1,  F02.818) Late onset Alzheimer's dementia  with behavioral disturbance (H)  Comment: memory loss. Tearful at times, agitation at times. Daytime alertness improved. Po intake more stable  Plan: 1. Cont. Every day, prn risperdal  2. Reassess for ongoing crying episodes over next couple days  3. For above s/s, may consider remeron    Electronically signed by:  LAY Wheatley CNP               Sincerely,        LAY Wheatley CNP

## 2022-12-05 NOTE — PROGRESS NOTES
[place] [facility]  [place] Medical Record Number:  [id number]  Place of Service where encounter took place:  [facility] ([facility]) [[id number]]  Chief Complaint   Patient presents with     Pain       HPI:    Jewels Cortez  is a 87 year old (1935), who is being seen today for an episodic care visit.  HPI information obtained from: facility chart records, facility staff, patient report and [facility] chart review. Today's concern is: pain, weakness, alzheimer's.  S/p R hip fx. Cont. On prn tramadol for pain. Noted to have increase daytime sleepiness, decrease in po intake. tamadol changed to prn, has had am risperdal dose on hold. Cont. Qpm, prn risperdal. Tearful at times. Gen. More alert during the day, has had improved po intake. Uses w.c. is amb. To amb. Uses walker. Cont. With therapies.       Past Medical and Surgical History reviewed in Epic today.    MEDICATIONS:    Current Outpatient Medications   Medication Sig Dispense Refill     acetaminophen (TYLENOL) 32 mg/mL liquid Take 650 mg by mouth every 4 hours as needed for fever or mild pain       risperiDONE (RISPERDAL) 1 MG/ML solution 0.25 mg Bid and bid prn 30 mL 11     senna-docusate (SENOKOT-S/PERICOLACE) 8.6-50 MG tablet Take 1 tablet by mouth 2 times daily as needed for constipation 30 tablet 0     SM ARTHRICREAM RUB 10 % external cream APPLY 1GM TOPICALLY TO RIGHT KNEE TWICE DAILY 85 g PRN     traMADol (ULTRAM) 50 MG tablet Take 0.5 tablets (25 mg) by mouth every 6 hours as needed 62 tablet 1         REVIEW OF SYSTEMS:  Limited secondary to cognitive impairment but today pt reports feeling ok    Objective:  /66   Pulse 71   Temp 97.4  F (36.3  C)   Resp 18   SpO2 94%   Exam:  GENERAL APPEARANCE:  in no distress, cooperative  ENT:  Mouth and posterior oropharynx normal, moist mucous membranes, [place]  EYES:  EOM, conjunctivae, lids, pupils and irises normal, PERRL  NECK:  flexion  RESP:  respiratory effort and  palpation of chest normal, lungs clear to auscultation , no respiratory distress, diminished breath sounds bibasilar  CV:  Palpation and auscultation of heart done , regular rate and rhythm, no murmur, rub, or gallop, no edema  ABDOMEN:  normal bowel sounds, soft, nontender, no hepatosplenomegaly or other masses, no guarding or rebound  M/S:   muscle strength 5/5 UEs, gen. LE weakness. severe kyphosis  NEURO:   Cranial nerves 2-12 are normal tested and grossly at patient's baseline, no tremor  PSYCH:  insight and judgement impaired, memory impaired , affect abnormal -flat    Labs:     Most Recent 3 BMP's:Recent Labs   Lab Test 11/11/22  0831 11/10/22  1705 11/10/22  0633 11/08/22  0736 11/07/22  0648 11/06/22  1730 11/06/22  0745 11/04/22  0542   NA  --   --   --   --  138  --  136 141   POTASSIUM 4.1 3.8 3.4   < > 3.9 4.5 3.5 4.2   CHLORIDE  --   --   --   --  104  --  99 105   CO2  --   --   --   --  26  --  26 26   BUN  --   --   --   --  10.1  --  13.2 11.8   CR 0.36*  --   --   --  0.46* 0.39* 0.45* 0.50*   ANIONGAP  --   --   --   --  8  --  11 10   MALICK  --   --   --   --  8.3*  --  8.8 8.4*   GLC  --   --   --   --  107*  --  157* 85    < > = values in this interval not displayed.       ASSESSMENT/PLAN:  (R52) Pain  (primary encounter diagnosis)  Comment: gen. Stable. Difficult to assess given alzheimer's.   Plan: 1. Cont. Tylenol  2. Cont. Prn tramadol  3. Monitor for non-verbal s/s pain  4. Reassess tramadol use over next couple weeks-if not using, discontinue.    (R23.915) Weakness of both lower extremities  Comment: some increased in amb. Improved LE strength . Severe kyphosis  Plan: 1. Address pain as above  2. Encourage amb. With walker as roseann  3. Encourage to come out of apt during the day for activities  4. Monitor for skin breakdown    (G30.1,  F02.818) Late onset Alzheimer's dementia with behavioral disturbance (H)  Comment: memory loss. Tearful at times, agitation at times. Daytime alertness  improved. Po intake more stable  Plan: 1. Cont. Every day, prn risperdal  2. Reassess for ongoing crying episodes over next couple days  3. For above s/s, may consider remeron    Electronically signed by:  LAY Wheatley CNP

## 2022-12-08 NOTE — LETTER
12/8/2022        RE: [facility]  [address]  [address]  [facility] [address]        [place] [facility]  [place] Medical Record Number:  [id number]  Place of Service where encounter took place:  [facility]  [employer] ([facility]) [[id number]]  Chief Complaint   Patient presents with     Fatigue       HPI:    Jewels Cortez  is a 87 year old (1935), who is being seen today for an episodic care visit.  HPI information obtained from: facility chart records, facility staff, patient report, [facility] chart review and family/first contact poa report. Today's concern is: fatigue, weakness, alzheimer's. S/p R hip surg. Cont. With therapies. Upon return to AL had increased daytime sleepiness, decreased act. Level, decrease in po intake.  Tramadol changed to prn. Sched. risperdal dose decreased to every day.  Has had increased daytime alertness, improved transfers. Pain stable. Has had ongoing decrease in po intake from baseline.      Past Medical and Surgical History reviewed in Epic today.    MEDICATIONS:    Current Outpatient Medications   Medication Sig Dispense Refill     risperiDONE (RISPERDAL) 1 MG/ML solution Take 0.25 mg by mouth At Bedtime And bid prn       acetaminophen (TYLENOL) 32 mg/mL liquid Take 650 mg by mouth every 4 hours as needed for fever or mild pain       senna-docusate (SENOKOT-S/PERICOLACE) 8.6-50 MG tablet Take 1 tablet by mouth 2 times daily as needed for constipation 30 tablet 0     SM ARTHRICREAM RUB 10 % external cream APPLY 1GM TOPICALLY TO RIGHT KNEE TWICE DAILY 85 g PRN     traMADol (ULTRAM) 50 MG tablet Take 0.5 tablets (25 mg) by mouth every 6 hours as needed 62 tablet 1         REVIEW OF SYSTEMS:  Limited secondary to cognitive impairment but today pt reports no current pain    Objective:  /66   Pulse 78   Temp 98.1  F (36.7  C)   Resp 18   SpO2 93%   Exam:  GENERAL APPEARANCE:  Alert, thin, cooperative  ENT:  Mouth and posterior  oropharynx normal, moist mucous membranes, [place]  EYES:  EOM, conjunctivae, lids, pupils and irises normal, PERRL  NECK:  neck flexion  RESP:  respiratory effort and palpation of chest normal, lungs clear to auscultation , no respiratory distress, diminished breath sounds bibasilar  CV:  Palpation and auscultation of heart done , regular rate and rhythm, no murmur, rub, or gallop, no edema  ABDOMEN:  normal bowel sounds, soft, nontender, no hepatosplenomegaly or other masses, no guarding or rebound  M/S:   muscle strength 5/5 UEs, gen LE weakness, severe kyphosis  NEURO:   Cranial nerves 2-12 are normal tested and grossly at patient's baseline, no tremor  PSYCH:  insight and judgement impaired, memory impaired , affect abnormal -flat    Labs:     Most Recent 3 CBC's:Recent Labs   Lab Test 11/29/22  1205 11/15/22  1359 11/10/22  0633 11/09/22  0700 11/08/22  0736 11/07/22  1702 11/07/22  0648 11/06/22  0745   WBC  --   --   --   --  9.9  --  8.4 15.9*   HGB 11.0* 9.5* 9.8*  --  7.0*   < > 7.4* 9.3*   MCV  --   --   --   --  88  --  88 86   PLT  --   --   --  424 340  --  331 384    < > = values in this interval not displayed.     Most Recent 3 BMP's:Recent Labs   Lab Test 11/11/22  0831 11/10/22  1705 11/10/22  0633 11/08/22  0736 11/07/22  0648 11/06/22  1730 11/06/22  0745 11/04/22  0542   NA  --   --   --   --  138  --  136 141   POTASSIUM 4.1 3.8 3.4   < > 3.9 4.5 3.5 4.2   CHLORIDE  --   --   --   --  104  --  99 105   CO2  --   --   --   --  26  --  26 26   BUN  --   --   --   --  10.1  --  13.2 11.8   CR 0.36*  --   --   --  0.46* 0.39* 0.45* 0.50*   ANIONGAP  --   --   --   --  8  --  11 10   MALICK  --   --   --   --  8.3*  --  8.8 8.4*   GLC  --   --   --   --  107*  --  157* 85    < > = values in this interval not displayed.       ASSESSMENT/PLAN:  (R53.83) Fatigue, unspecified type  (primary encounter diagnosis)  Comment: improved. Transferring with less assist. Not currently amb. For staff  Plan: 1. Cont. Prn  tramadol  2. Cont. PT  3. Cont. To invite to activities throughout the day  4. Monitor for insomnia    (R29.898) Weakness of both lower extremities  Comment: improving. Pain stable  Plan:1. Therapies as above  2. Encourage w.b. activities  3. Monitor for skin breakdown    (G30.1,  F02.818) Late onset Alzheimer's dementia with behavioral disturbance (H)  Comment: mood gen. Stable. No reports of paranoia. Recent decrease in risperdal dose  Plan: 1. Cont at bedtime, prn risperdal  2. Cont. To follow po intake  3. Start Ensure shake every day  4. Follow wt.s  5. Spoke with POA, will cont. To monitor po intake.  For further decline in gen. Status will again discuss Hospice    Electronically signed by:  LAY Wheatley CNP                 Sincerely,        LAY Wheatley CNP

## 2022-12-08 NOTE — PROGRESS NOTES
[facility]  [place] Medical Record Number:  [id number]  Place of Service where encounter took place:  [facility]  [facility] ([facility]) [[id number]]  Chief Complaint   Patient presents with     Fatigue       HPI:    Jweels Cortez  is a 87 year old (1935), who is being seen today for an episodic care visit.  HPI information obtained from: facility chart records, facility staff, patient report, [facility] chart review and family/first contact poa report. Today's concern is: fatigue, weakness, alzheimer's. S/p R hip surg. Cont. With therapies. Upon return to AL had increased daytime sleepiness, decreased act. Level, decrease in po intake.  Tramadol changed to prn. Sched. risperdal dose decreased to every day.  Has had increased daytime alertness, improved transfers. Pain stable. Has had ongoing decrease in po intake from baseline.      Past Medical and Surgical History reviewed in Epic today.    MEDICATIONS:    Current Outpatient Medications   Medication Sig Dispense Refill     risperiDONE (RISPERDAL) 1 MG/ML solution Take 0.25 mg by mouth At Bedtime And bid prn       acetaminophen (TYLENOL) 32 mg/mL liquid Take 650 mg by mouth every 4 hours as needed for fever or mild pain       senna-docusate (SENOKOT-S/PERICOLACE) 8.6-50 MG tablet Take 1 tablet by mouth 2 times daily as needed for constipation 30 tablet 0     SM ARTHRICREAM RUB 10 % external cream APPLY 1GM TOPICALLY TO RIGHT KNEE TWICE DAILY 85 g PRN     traMADol (ULTRAM) 50 MG tablet Take 0.5 tablets (25 mg) by mouth every 6 hours as needed 62 tablet 1         REVIEW OF SYSTEMS:  Limited secondary to cognitive impairment but today pt reports no current pain    Objective:  /66   Pulse 78   Temp 98.1  F (36.7  C)   Resp 18   SpO2 93%   Exam:  GENERAL APPEARANCE:  Alert, thin, cooperative  ENT:  Mouth and posterior oropharynx normal, moist mucous membranes, [place]  EYES:  EOM, conjunctivae, lids, pupils and irises normal,  PERRL  NECK:  neck flexion  RESP:  respiratory effort and palpation of chest normal, lungs clear to auscultation , no respiratory distress, diminished breath sounds bibasilar  CV:  Palpation and auscultation of heart done , regular rate and rhythm, no murmur, rub, or gallop, no edema  ABDOMEN:  normal bowel sounds, soft, nontender, no hepatosplenomegaly or other masses, no guarding or rebound  M/S:   muscle strength 5/5 UEs, gen LE weakness, severe kyphosis  NEURO:   Cranial nerves 2-12 are normal tested and grossly at patient's baseline, no tremor  PSYCH:  insight and judgement impaired, memory impaired , affect abnormal -flat    Labs:     Most Recent 3 CBC's:Recent Labs   Lab Test 11/29/22  1205 11/15/22  1359 11/10/22  0633 11/09/22  0700 11/08/22  0736 11/07/22  1702 11/07/22  0648 11/06/22  0745   WBC  --   --   --   --  9.9  --  8.4 15.9*   HGB 11.0* 9.5* 9.8*  --  7.0*   < > 7.4* 9.3*   MCV  --   --   --   --  88  --  88 86   PLT  --   --   --  424 340  --  331 384    < > = values in this interval not displayed.     Most Recent 3 BMP's:Recent Labs   Lab Test 11/11/22  0831 11/10/22  1705 11/10/22  0633 11/08/22  0736 11/07/22  0648 11/06/22  1730 11/06/22  0745 11/04/22  0542   NA  --   --   --   --  138  --  136 141   POTASSIUM 4.1 3.8 3.4   < > 3.9 4.5 3.5 4.2   CHLORIDE  --   --   --   --  104  --  99 105   CO2  --   --   --   --  26  --  26 26   BUN  --   --   --   --  10.1  --  13.2 11.8   CR 0.36*  --   --   --  0.46* 0.39* 0.45* 0.50*   ANIONGAP  --   --   --   --  8  --  11 10   MALICK  --   --   --   --  8.3*  --  8.8 8.4*   GLC  --   --   --   --  107*  --  157* 85    < > = values in this interval not displayed.       ASSESSMENT/PLAN:  (R53.78) Fatigue, unspecified type  (primary encounter diagnosis)  Comment: improved. Transferring with less assist. Not currently amb. For staff  Plan: 1. Cont. Prn tramadol  2. Cont. PT  3. Cont. To invite to activities throughout the day  4. Monitor for  insomnia    (R29.898) Weakness of both lower extremities  Comment: improving. Pain stable  Plan:1. Therapies as above  2. Encourage w.b. activities  3. Monitor for skin breakdown    (G30.1,  F02.818) Late onset Alzheimer's dementia with behavioral disturbance (H)  Comment: mood gen. Stable. No reports of paranoia. Recent decrease in risperdal dose  Plan: 1. Cont at bedtime, prn risperdal  2. Cont. To follow po intake  3. Start Ensure shake every day  4. Follow wt.s  5. Spoke with POA, will cont. To monitor po intake.  For further decline in gen. Status will again discuss Hospice    Electronically signed by:  LAY Wheatley CNP

## 2022-12-12 NOTE — LETTER
12/12/2022        RE: [facility]  [address]  [address]  [facility] [address]        [facility]  [place] Medical Record Number:  [id number]  Place of Service where encounter took place:  [facility] [employer] [facility] [employer]  [employer] ([facility]) [[id number]]  Chief Complaint   Patient presents with     Fatigue       HPI:    Jewels Cortez  is a 87 year old (1935), who is being seen today for an episodic care visit.  HPI information obtained from: facility chart records, facility staff, patient report and [facility] chart review. Today's concern is: fatigue, alzheimer's, pain.  S/p R hip ORIF last month. Cont. With therapies. Noted to have increased daytime lethargy, decrease in po intake. Limited w.b. activities. Tramadol changed to prn, had decrease in sched. Risperdal. Has had increased alertness during the day, however ongoing decrease in po intake. Staff reports over past week has now had improved po intake. No reports of increased R hip pain. Cont. To amb. Only short distances, typically using wheelchair.       Past Medical and Surgical History reviewed in Epic today.    MEDICATIONS:    Current Outpatient Medications   Medication Sig Dispense Refill     acetaminophen (TYLENOL) 32 mg/mL liquid Take 650 mg by mouth every 4 hours as needed for fever or mild pain       risperiDONE (RISPERDAL) 1 MG/ML solution Take 0.25 mg by mouth At Bedtime And bid prn       senna-docusate (SENOKOT-S/PERICOLACE) 8.6-50 MG tablet Take 1 tablet by mouth 2 times daily as needed for constipation 30 tablet 0     SM ARTHRICREAM RUB 10 % external cream APPLY 1GM TOPICALLY TO RIGHT KNEE TWICE DAILY 85 g PRN     traMADol (ULTRAM) 50 MG tablet Take 0.5 tablets (25 mg) by mouth every 6 hours as needed 62 tablet 1         REVIEW OF SYSTEMS:  Limited secondary to cognitive impairment but today pt reports no current pain    Objective:  /75   Pulse 76   Resp 18   SpO2 93%   Exam:  GENERAL APPEARANCE:   Alert, cooperative  ENT:  Mouth and posterior oropharynx normal, moist mucous membranes, [place]  EYES:  EOM, conjunctivae, lids, pupils and irises normal, PERRL  RESP:  respiratory effort and palpation of chest normal, lungs clear to auscultation , no respiratory distress, diminished breath sounds bibasilar  CV:  Palpation and auscultation of heart done , regular rate and rhythm, no murmur, rub, or gallop, no edema  ABDOMEN:  normal bowel sounds, soft, nontender, no hepatosplenomegaly or other masses, no guarding or rebound  M/S:   muscle strength 5/5 UEs, gen. LE weakness  NEURO:   Cranial nerves 2-12 are normal tested and grossly at patient's baseline, speech clear  PSYCH:  insight and judgement impaired, memory impaired , affect abnormal -flat    Labs:     Most Recent 3 BMP's:Recent Labs   Lab Test 11/11/22  0831 11/10/22  1705 11/10/22  0633 11/08/22  0736 11/07/22  0648 11/06/22  1730 11/06/22  0745 11/04/22  0542   NA  --   --   --   --  138  --  136 141   POTASSIUM 4.1 3.8 3.4   < > 3.9 4.5 3.5 4.2   CHLORIDE  --   --   --   --  104  --  99 105   CO2  --   --   --   --  26  --  26 26   BUN  --   --   --   --  10.1  --  13.2 11.8   CR 0.36*  --   --   --  0.46* 0.39* 0.45* 0.50*   ANIONGAP  --   --   --   --  8  --  11 10   MALICK  --   --   --   --  8.3*  --  8.8 8.4*   GLC  --   --   --   --  107*  --  157* 85    < > = values in this interval not displayed.       ASSESSMENT/PLAN:  (R53.83) Fatigue, unspecified type  (primary encounter diagnosis)  Comment: improved daytime alertness.  Plan: 1. Cont. To invite to activities throughout the day  2. Monitor for sleep disturbance  3. Cont. therapies    (G30.1,  F02.818) Late onset Alzheimer's dementia with behavioral disturbance (H)  Comment: memory loss. No sig. Increase in agitation since decrease in risperdal dose. Po intake improved over past week  Plan: 1. Cont. Sched, prn risperdal  2. Monitor for reports of delusions, paranoia  3. Follow wt.s, po intake. Start  Ensure    (R52) Pain  Comment: appears stable, however has had ongoing limited amb  Plan: 1. Cont. aspercreme to knees  2. Cont. Prn tramadol  3. If tramadol not used over next couple weeks, may dc    Electronically signed by:  LAY Wheatley CNP               Sincerely,        LAY Wheatley CNP

## 2022-12-12 NOTE — PROGRESS NOTES
[facility]  [place] Medical Record Number:  [id number]  Place of Service where encounter took place:  [facility]  [facility] [[id number][facility]  Chief Complaint   Patient presents with     Fatigue       HPI:    Jewels Cortez  is a 87 year old (1935), who is being seen today for an episodic care visit.  HPI information obtained from: facility chart records, facility staff, patient report and [facility] chart review. Today's concern is: fatigue, alzheimer's, pain.  S/p R hip ORIF last month. Cont. With therapies. Noted to have increased daytime lethargy, decrease in po intake. Limited w.b. activities. Tramadol changed to prn, had decrease in sched. Risperdal. Has had increased alertness during the day, however ongoing decrease in po intake. Staff reports over past week has now had improved po intake. No reports of increased R hip pain. Cont. To amb. Only short distances, typically using wheelchair.       Past Medical and Surgical History reviewed in Epic today.    MEDICATIONS:    Current Outpatient Medications   Medication Sig Dispense Refill     acetaminophen (TYLENOL) 32 mg/mL liquid Take 650 mg by mouth every 4 hours as needed for fever or mild pain       risperiDONE (RISPERDAL) 1 MG/ML solution Take 0.25 mg by mouth At Bedtime And bid prn       senna-docusate (SENOKOT-S/PERICOLACE) 8.6-50 MG tablet Take 1 tablet by mouth 2 times daily as needed for constipation 30 tablet 0     SM ARTHRICREAM RUB 10 % external cream APPLY 1GM TOPICALLY TO RIGHT KNEE TWICE DAILY 85 g PRN     traMADol (ULTRAM) 50 MG tablet Take 0.5 tablets (25 mg) by mouth every 6 hours as needed 62 tablet 1         REVIEW OF SYSTEMS:  Limited secondary to cognitive impairment but today pt reports no current pain    Objective:  /75   Pulse 76   Resp 18   SpO2 93%   Exam:  GENERAL APPEARANCE:  Alert, cooperative  ENT:  Mouth and posterior oropharynx normal, moist mucous membranes, [place]  EYES:  EOM,  conjunctivae, lids, pupils and irises normal, PERRL  RESP:  respiratory effort and palpation of chest normal, lungs clear to auscultation , no respiratory distress, diminished breath sounds bibasilar  CV:  Palpation and auscultation of heart done , regular rate and rhythm, no murmur, rub, or gallop, no edema  ABDOMEN:  normal bowel sounds, soft, nontender, no hepatosplenomegaly or other masses, no guarding or rebound  M/S:   muscle strength 5/5 UEs, gen. LE weakness  NEURO:   Cranial nerves 2-12 are normal tested and grossly at patient's baseline, speech clear  PSYCH:  insight and judgement impaired, memory impaired , affect abnormal -flat    Labs:     Most Recent 3 BMP's:Recent Labs   Lab Test 11/11/22  0831 11/10/22  1705 11/10/22  0633 11/08/22  0736 11/07/22  0648 11/06/22  1730 11/06/22  0745 11/04/22  0542   NA  --   --   --   --  138  --  136 141   POTASSIUM 4.1 3.8 3.4   < > 3.9 4.5 3.5 4.2   CHLORIDE  --   --   --   --  104  --  99 105   CO2  --   --   --   --  26  --  26 26   BUN  --   --   --   --  10.1  --  13.2 11.8   CR 0.36*  --   --   --  0.46* 0.39* 0.45* 0.50*   ANIONGAP  --   --   --   --  8  --  11 10   MALICK  --   --   --   --  8.3*  --  8.8 8.4*   GLC  --   --   --   --  107*  --  157* 85    < > = values in this interval not displayed.       ASSESSMENT/PLAN:  (R53.83) Fatigue, unspecified type  (primary encounter diagnosis)  Comment: improved daytime alertness.  Plan: 1. Cont. To invite to activities throughout the day  2. Monitor for sleep disturbance  3. Cont. therapies    (G30.1,  F02.818) Late onset Alzheimer's dementia with behavioral disturbance (H)  Comment: memory loss. No sig. Increase in agitation since decrease in risperdal dose. Po intake improved over past week  Plan: 1. Cont. Sched, prn risperdal  2. Monitor for reports of delusions, paranoia  3. Follow wt.s, po intake. Start Ensure    (R52) Pain  Comment: appears stable, however has had ongoing limited amb  Plan: 1. Cont.  aspercreme to knees  2. Cont. Prn tramadol  3. If tramadol not used over next couple weeks, may dc    Electronically signed by:  LAY Wheatley CNP

## 2023-01-01 ENCOUNTER — ASSISTED LIVING VISIT (OUTPATIENT)
Dept: GERIATRICS | Facility: CLINIC | Age: 88
End: 2023-01-01
Payer: MEDICARE

## 2023-01-01 ENCOUNTER — TELEPHONE (OUTPATIENT)
Dept: GERIATRICS | Facility: CLINIC | Age: 88
End: 2023-01-01
Payer: MEDICARE

## 2023-01-01 VITALS
RESPIRATION RATE: 18 BRPM | DIASTOLIC BLOOD PRESSURE: 77 MMHG | OXYGEN SATURATION: 93 % | HEART RATE: 78 BPM | SYSTOLIC BLOOD PRESSURE: 129 MMHG

## 2023-01-01 VITALS
HEART RATE: 82 BPM | DIASTOLIC BLOOD PRESSURE: 79 MMHG | RESPIRATION RATE: 18 BRPM | BODY MASS INDEX: 14.27 KG/M2 | WEIGHT: 78 LBS | SYSTOLIC BLOOD PRESSURE: 120 MMHG | OXYGEN SATURATION: 93 %

## 2023-01-01 VITALS
RESPIRATION RATE: 20 BRPM | OXYGEN SATURATION: 93 % | DIASTOLIC BLOOD PRESSURE: 45 MMHG | SYSTOLIC BLOOD PRESSURE: 110 MMHG | HEART RATE: 77 BPM

## 2023-01-01 VITALS
SYSTOLIC BLOOD PRESSURE: 120 MMHG | DIASTOLIC BLOOD PRESSURE: 66 MMHG | RESPIRATION RATE: 16 BRPM | HEART RATE: 78 BPM | OXYGEN SATURATION: 93 %

## 2023-01-01 VITALS
OXYGEN SATURATION: 93 % | SYSTOLIC BLOOD PRESSURE: 127 MMHG | DIASTOLIC BLOOD PRESSURE: 65 MMHG | HEART RATE: 78 BPM | RESPIRATION RATE: 18 BRPM

## 2023-01-01 VITALS
SYSTOLIC BLOOD PRESSURE: 133 MMHG | OXYGEN SATURATION: 92 % | RESPIRATION RATE: 18 BRPM | DIASTOLIC BLOOD PRESSURE: 79 MMHG | HEART RATE: 76 BPM

## 2023-01-01 VITALS
DIASTOLIC BLOOD PRESSURE: 77 MMHG | HEART RATE: 69 BPM | OXYGEN SATURATION: 94 % | RESPIRATION RATE: 16 BRPM | SYSTOLIC BLOOD PRESSURE: 129 MMHG

## 2023-01-01 VITALS
HEART RATE: 78 BPM | SYSTOLIC BLOOD PRESSURE: 129 MMHG | WEIGHT: 89 LBS | TEMPERATURE: 97.2 F | OXYGEN SATURATION: 94 % | BODY MASS INDEX: 16.38 KG/M2 | HEIGHT: 62 IN | DIASTOLIC BLOOD PRESSURE: 66 MMHG | RESPIRATION RATE: 16 BRPM

## 2023-01-01 VITALS
OXYGEN SATURATION: 94 % | HEART RATE: 73 BPM | SYSTOLIC BLOOD PRESSURE: 122 MMHG | DIASTOLIC BLOOD PRESSURE: 76 MMHG | RESPIRATION RATE: 16 BRPM

## 2023-01-01 VITALS
OXYGEN SATURATION: 92 % | HEART RATE: 68 BPM | SYSTOLIC BLOOD PRESSURE: 121 MMHG | RESPIRATION RATE: 16 BRPM | DIASTOLIC BLOOD PRESSURE: 66 MMHG

## 2023-01-01 VITALS
OXYGEN SATURATION: 94 % | HEART RATE: 78 BPM | RESPIRATION RATE: 16 BRPM | SYSTOLIC BLOOD PRESSURE: 124 MMHG | DIASTOLIC BLOOD PRESSURE: 66 MMHG

## 2023-01-01 DIAGNOSIS — R29.898 WEAKNESS OF BOTH LOWER EXTREMITIES: ICD-10-CM

## 2023-01-01 DIAGNOSIS — K59.00 CONSTIPATION, UNSPECIFIED CONSTIPATION TYPE: ICD-10-CM

## 2023-01-01 DIAGNOSIS — R52 PAIN: ICD-10-CM

## 2023-01-01 DIAGNOSIS — F41.9 ANXIETY: Primary | ICD-10-CM

## 2023-01-01 DIAGNOSIS — R53.1 GENERALIZED WEAKNESS: ICD-10-CM

## 2023-01-01 DIAGNOSIS — M79.661 PAIN OF RIGHT LOWER LEG: Primary | ICD-10-CM

## 2023-01-01 DIAGNOSIS — R52 PAIN: Primary | ICD-10-CM

## 2023-01-01 DIAGNOSIS — F03.918 DEMENTIA WITH BEHAVIORAL DISTURBANCE (H): ICD-10-CM

## 2023-01-01 DIAGNOSIS — Z53.9 DIAGNOSIS NOT YET DEFINED: Primary | ICD-10-CM
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
## 2023-01-01 DIAGNOSIS — R60.0 LEG EDEMA, RIGHT: ICD-10-CM

## 2023-01-01 DIAGNOSIS — M62.81 GENERALIZED MUSCLE WEAKNESS: ICD-10-CM

## 2023-01-01 DIAGNOSIS — Z71.89 ACP (ADVANCE CARE PLANNING): ICD-10-CM

## 2023-01-01 DIAGNOSIS — Z78.9 TAKES DIETARY SUPPLEMENTS: Primary | ICD-10-CM

## 2023-01-01 DIAGNOSIS — R53.83 FATIGUE, UNSPECIFIED TYPE: ICD-10-CM

## 2023-01-01 DIAGNOSIS — I48.91 ATRIAL FIBRILLATION, UNSPECIFIED TYPE (H): ICD-10-CM

## 2023-01-01 DIAGNOSIS — E43 SEVERE PROTEIN-CALORIE MALNUTRITION (H): ICD-10-CM

## 2023-01-01 DIAGNOSIS — F22 PARANOIA (H): ICD-10-CM

## 2023-01-01 DIAGNOSIS — R41.0 CONFUSION: ICD-10-CM

## 2023-01-01 DIAGNOSIS — L97.412 HEEL ULCER, RIGHT, WITH FAT LAYER EXPOSED (H): Primary | ICD-10-CM

## 2023-01-01 DIAGNOSIS — F41.9 ANXIETY: ICD-10-CM

## 2023-01-01 DIAGNOSIS — L89.612 PRESSURE ULCER OF RIGHT HEEL, STAGE 2 (H): Primary | ICD-10-CM

## 2023-01-01 PROCEDURE — 99349 HOME/RES VST EST MOD MDM 40: CPT | Performed by: NURSE PRACTITIONER

## 2023-01-01 PROCEDURE — 99349 HOME/RES VST EST MOD MDM 40: CPT | Mod: GV | Performed by: NURSE PRACTITIONER

## 2023-01-01 PROCEDURE — G0180 MD CERTIFICATION HHA PATIENT: HCPCS | Performed by: INTERNAL MEDICINE

## 2023-01-01 PROCEDURE — 99348 HOME/RES VST EST LOW MDM 30: CPT | Performed by: NURSE PRACTITIONER

## 2023-01-01 RX ORDER — CITALOPRAM HYDROBROMIDE 10 MG/1
10 TABLET ORAL DAILY
COMMUNITY

## 2023-01-01 RX ORDER — LACTOSE-REDUCED FOOD
LIQUID (ML) ORAL
Qty: 237 ML | Refills: 11 | Status: SHIPPED | OUTPATIENT
Start: 2023-01-01

## 2023-01-01 RX ORDER — RISPERIDONE 1 MG/ML
SOLUTION ORAL
Qty: 30 ML | Refills: 97 | Status: SHIPPED | OUTPATIENT
Start: 2023-01-01 | End: 2023-01-01

## 2023-01-01 RX ORDER — MORPHINE SULFATE 30 MG/1
5 TABLET ORAL
COMMUNITY

## 2023-01-01 RX ORDER — DOCUSATE SODIUM 50MG AND SENNOSIDES 8.6MG 8.6; 5 MG/1; MG/1
TABLET, FILM COATED ORAL
Qty: 30 TABLET | Refills: 11 | Status: SHIPPED | OUTPATIENT
Start: 2023-01-01

## 2023-01-01 RX ORDER — RISPERIDONE 0.5 MG/1
TABLET ORAL
Qty: 30 TABLET | Refills: 11 | Status: SHIPPED | OUTPATIENT
Start: 2023-01-01

## 2023-01-01 RX ORDER — RISPERIDONE 1 MG/ML
0.25 SOLUTION ORAL EVERY MORNING
COMMUNITY
End: 2023-01-01

## 2023-01-01 RX ORDER — HALOPERIDOL 0.5 MG/1
0.5 TABLET ORAL
COMMUNITY

## 2023-01-01 RX ORDER — ACETAMINOPHEN 500 MG
1000 TABLET ORAL 2 TIMES DAILY
COMMUNITY
End: 2023-01-01

## 2023-01-01 RX ORDER — RISPERIDONE 0.25 MG/1
0.25 TABLET ORAL DAILY
COMMUNITY
End: 2023-01-01

## 2023-01-01 RX ORDER — RISPERIDONE 1 MG/ML
SOLUTION ORAL
Qty: 30 ML | Refills: 11 | Status: SHIPPED | OUTPATIENT
Start: 2023-01-01

## 2023-01-01 RX ORDER — LORAZEPAM 0.5 MG/1
0.5 TABLET ORAL
COMMUNITY

## 2023-01-01 RX ORDER — PSEUDOEPHED/ACETAMINOPH/DIPHEN 30MG-500MG
TABLET ORAL
Qty: 360 TABLET | Refills: 97 | Status: SHIPPED | OUTPATIENT
Start: 2023-01-01

## 2023-01-01 RX ORDER — RISPERIDONE 1 MG/ML
0.25 SOLUTION ORAL 2 TIMES DAILY
COMMUNITY
End: 2023-01-01

## 2023-01-02 NOTE — PROGRESS NOTES
[facility]  [place] Medical Record Number:  [id number]  Place of Service where encounter took place:  [facility]  [facility] ([facility]) [[id number]]  Chief Complaint   Patient presents with     Pain       HPI:    Jewels Cortez  is a 87 year old (1935), who is being seen today for an episodic care visit.  HPI information obtained from: facility chart records, facility staff, patient report and [facility] chart review. Today's concern is: pain, constipation. S/p ORIF R hip 11/22. Pain has been stable. Completed PT. Has not used tramadol in past month.  Has prn tylenol. Staff reports resident had mult BMs last pm. Not diarrhea. Unclear if having increased constipation. No reports of abd pain. Po intake gen. Stable.       Past Medical and Surgical History reviewed in Epic today.    MEDICATIONS:    Current Outpatient Medications   Medication Sig Dispense Refill     acetaminophen (TYLENOL) 32 mg/mL liquid Take 650 mg by mouth every 4 hours as needed for fever or mild pain       risperiDONE (RISPERDAL) 1 MG/ML solution Take 0.25 mg by mouth At Bedtime And bid prn       senna-docusate (SENOKOT-S/PERICOLACE) 8.6-50 MG tablet Take 1 tablet by mouth 2 times daily as needed for constipation 30 tablet 0     SM ARTHRICREAM RUB 10 % external cream APPLY 1GM TOPICALLY TO RIGHT KNEE TWICE DAILY 85 g PRN         REVIEW OF SYSTEMS:  Limited secondary to cognitive impairment but today pt reports no current pain    Objective:  /77   Pulse 69   Resp 16   SpO2 94%   Exam:  GENERAL APPEARANCE:  Alert, in no distress, sl anxious at times  ENT:  Mouth and posterior oropharynx normal, moist mucous membranes, [place]  EYES:  EOM, conjunctivae, lids, pupils and irises normal, PERRL  RESP:  respiratory effort and palpation of chest normal, lungs clear to auscultation , no respiratory distress  CV:  Palpation and auscultation of heart done , regular rate and rhythm, no murmur, rub, or gallop, no  edema  ABDOMEN:  normal bowel sounds, soft, nontender, no hepatosplenomegaly or other masses, no guarding or rebound  M/S:   muscle strength 5/5 UEs, some gen LE weakness  NEURO:   Cranial nerves 2-12 are normal tested and grossly at patient's baseline, speech clear  PSYCH:  insight and judgement impaired, memory impaired , affect abnormal -flat    Labs:     Most Recent 3 BMP's:Recent Labs   Lab Test 11/11/22  0831 11/10/22  1705 11/10/22  0633 11/08/22  0736 11/07/22  0648 11/06/22  1730 11/06/22  0745 11/04/22  0542   NA  --   --   --   --  138  --  136 141   POTASSIUM 4.1 3.8 3.4   < > 3.9 4.5 3.5 4.2   CHLORIDE  --   --   --   --  104  --  99 105   CO2  --   --   --   --  26  --  26 26   BUN  --   --   --   --  10.1  --  13.2 11.8   CR 0.36*  --   --   --  0.46* 0.39* 0.45* 0.50*   ANIONGAP  --   --   --   --  8  --  11 10   MALICK  --   --   --   --  8.3*  --  8.8 8.4*   GLC  --   --   --   --  107*  --  157* 85    < > = values in this interval not displayed.       ASSESSMENT/PLAN:  (R52) Pain  (primary encounter diagnosis)  Comment: s/p R hip ORIF 11/22. No recent prn tramadol use  Plan: 1. Discontinue tramadol  2. Cont. Prn tylenol  3. Monitor for reports of pain  4. For increased pain, may sched. tylenol    (K59.00) Constipation, unspecified constipation type  Comment: mult BM last night per staff.  Plan:1. Start prn senna  2. Encourage fluids  3. Encourage increase in act. Level  4. For increased constipation may sched. senna    Electronically signed by:  LAY Wheatley CNP

## 2023-01-02 NOTE — LETTER
1/2/2023        RE: [facility]  [address]  [address]  [facility] [address]        [facility]  [place] Medical Record Number:  [id number]  Place of Service where encounter took place:  [facility]  [employer] ([facility]) [[id number]]  Chief Complaint   Patient presents with     Pain       HPI:    Jewels Cortez  is a 87 year old (1935), who is being seen today for an episodic care visit.  HPI information obtained from: facility chart records, facility staff, patient report and [facility] chart review. Today's concern is: pain, constipation. S/p ORIF R hip 11/22. Pain has been stable. Completed PT. Has not used tramadol in past month.  Has prn tylenol. Staff reports resident had mult BMs last pm. Not diarrhea. Unclear if having increased constipation. No reports of abd pain. Po intake gen. Stable.       Past Medical and Surgical History reviewed in Epic today.    MEDICATIONS:    Current Outpatient Medications   Medication Sig Dispense Refill     acetaminophen (TYLENOL) 32 mg/mL liquid Take 650 mg by mouth every 4 hours as needed for fever or mild pain       risperiDONE (RISPERDAL) 1 MG/ML solution Take 0.25 mg by mouth At Bedtime And bid prn       senna-docusate (SENOKOT-S/PERICOLACE) 8.6-50 MG tablet Take 1 tablet by mouth 2 times daily as needed for constipation 30 tablet 0     SM ARTHRICREAM RUB 10 % external cream APPLY 1GM TOPICALLY TO RIGHT KNEE TWICE DAILY 85 g PRN         REVIEW OF SYSTEMS:  Limited secondary to cognitive impairment but today pt reports no current pain    Objective:  /77   Pulse 69   Resp 16   SpO2 94%   Exam:  GENERAL APPEARANCE:  Alert, in no distress, sl anxious at times  ENT:  Mouth and posterior oropharynx normal, moist mucous membranes, [place]  EYES:  EOM, conjunctivae, lids, pupils and irises normal, PERRL  RESP:  respiratory effort and palpation of chest normal, lungs clear to auscultation , no respiratory distress  CV:   Palpation and auscultation of heart done , regular rate and rhythm, no murmur, rub, or gallop, no edema  ABDOMEN:  normal bowel sounds, soft, nontender, no hepatosplenomegaly or other masses, no guarding or rebound  M/S:   muscle strength 5/5 UEs, some gen LE weakness  NEURO:   Cranial nerves 2-12 are normal tested and grossly at patient's baseline, speech clear  PSYCH:  insight and judgement impaired, memory impaired , affect abnormal -flat    Labs:     Most Recent 3 BMP's:Recent Labs   Lab Test 11/11/22  0831 11/10/22  1705 11/10/22  0633 11/08/22  0736 11/07/22  0648 11/06/22  1730 11/06/22  0745 11/04/22  0542   NA  --   --   --   --  138  --  136 141   POTASSIUM 4.1 3.8 3.4   < > 3.9 4.5 3.5 4.2   CHLORIDE  --   --   --   --  104  --  99 105   CO2  --   --   --   --  26  --  26 26   BUN  --   --   --   --  10.1  --  13.2 11.8   CR 0.36*  --   --   --  0.46* 0.39* 0.45* 0.50*   ANIONGAP  --   --   --   --  8  --  11 10   MALICK  --   --   --   --  8.3*  --  8.8 8.4*   GLC  --   --   --   --  107*  --  157* 85    < > = values in this interval not displayed.       ASSESSMENT/PLAN:  (R52) Pain  (primary encounter diagnosis)  Comment: s/p R hip ORIF 11/22. No recent prn tramadol use  Plan: 1. Discontinue tramadol  2. Cont. Prn tylenol  3. Monitor for reports of pain  4. For increased pain, may sched. tylenol    (K59.00) Constipation, unspecified constipation type  Comment: jey RASMUSSEN last night per staff.  Plan:1. Start prn senna  2. Encourage fluids  3. Encourage increase in act. Level  4. For increased constipation may sched. senna    Electronically signed by:  LAY Wheatley CNP               Sincerely,        LAY Wheatley CNP

## 2023-01-19 NOTE — LETTER
1/19/2023        RE: [facility]  [address]  [address]  [facility] [address]        [facility]  [place] Medical Record Number:  [id number]  Place of Service where encounter took place:  [facility] [employer]  [facility] ([facility]) [[id number]]  Chief Complaint   Patient presents with     Anxiety       HPI:    Jewels Cortez  is a 87 year old (1935), who is being seen today for an episodic care visit.  HPI information obtained from: facility chart records, facility staff, patient report and [facility] chart review. Today's concern is: anxiety, paranoia, dementia, a-fib. S/p R hip surg, fx. Pain stable. Cont. On tylenol. Had some increased daytime sleepiness. Am risperdal dose held, cont. On hs risperdal 0.25 mg.  Has had increased daytime alertness, improved po intake. Notes to have increase in daytime agitation, crying episodes. No reports of paranoia. Has memory loss due to dementia. No reports of insomnia. Has a-fib. HR stable. No recent reports of dizziness.       Past Medical and Surgical History reviewed in Epic today.    MEDICATIONS:    Current Outpatient Medications   Medication Sig Dispense Refill     risperiDONE (RISPERDAL) 1 MG/ML solution Take 0.25 mg by mouth every morning And every day prn       acetaminophen (TYLENOL) 32 mg/mL liquid Take 650 mg by mouth every 4 hours as needed for fever or mild pain       SENEXON-S 8.6-50 MG tablet TAKE 1 TABLET BY MOUTH TWICE DAILY AS NEEDED FOR CONSTIPATION 30 tablet 11     SM ARTHRICREAM RUB 10 % external cream APPLY 1GM TOPICALLY TO RIGHT KNEE TWICE DAILY 85 g PRN         REVIEW OF SYSTEMS:  Limited secondary to cognitive impairment but today pt reports no current pain    Objective:  /76   Pulse 73   Resp 16   SpO2 94%   Exam:  GENERAL APPEARANCE:  Alert, anxious  ENT:  Mouth and posterior oropharynx normal, moist mucous membranes, [place]  EYES:  EOM, conjunctivae, lids, pupils and irises normal, PERRL  RESP:   respiratory effort and palpation of chest normal, lungs clear to auscultation , no respiratory distress  CV:  Palpation and auscultation of heart done , regular rate and rhythm, no murmur, rub, or gallop, no edema  ABDOMEN:  normal bowel sounds, soft, nontender, no hepatosplenomegaly or other masses, no guarding or rebound  M/S:   muscle strength 5/5 all 4 ext., some gen. LE weakness. kyphosis  NEURO:   Cranial nerves 2-12 are normal tested and grossly at patient's baseline, no tremor  PSYCH:  insight and judgement impaired, memory impaired , affect abnormal -flat. confusion, delusions present    Labs:     Most Recent 3 BMP's:Recent Labs   Lab Test 11/11/22  0831 11/10/22  1705 11/10/22  0633 11/08/22  0736 11/07/22  0648 11/06/22  1730 11/06/22  0745 11/04/22  0542   NA  --   --   --   --  138  --  136 141   POTASSIUM 4.1 3.8 3.4   < > 3.9 4.5 3.5 4.2   CHLORIDE  --   --   --   --  104  --  99 105   CO2  --   --   --   --  26  --  26 26   BUN  --   --   --   --  10.1  --  13.2 11.8   CR 0.36*  --   --   --  0.46* 0.39* 0.45* 0.50*   ANIONGAP  --   --   --   --  8  --  11 10   MALICK  --   --   --   --  8.3*  --  8.8 8.4*   GLC  --   --   --   --  107*  --  157* 85    < > = values in this interval not displayed.       ASSESSMENT/PLAN:  (F41.9) Anxiety  (primary encounter diagnosis)  Comment: ongoing increase in s/s.   Plan: 1. Start am risperdal  2. Cont. Prn risperdal  3. Monitor for reports of insomnia    (F22) Paranoia (H)  Comment: no recent paranoid statements or med refusal. Po intake stable  Plan: 1. risperdal as above  2. Monitor for paranoia  3. Monitor for med refusal    (F03.918) Dementia with behavioral disturbance  Comment: memory loss. Delusions. Agitation at times  Plan: 1. Change hs risperdal to am admin time  2. Cont. Prn risperdal  3. For ongoing delusions, may increase risperdal to bid  4. Cont. Ensure, monitor for wt. loss    (I48.91) Atrial fibrillation, unspecified type (H)  Comment: HR  stable  Plan: 1. Follow HRs  2. Monitor for dizziness    Electronically signed by:  LAY Wheatley CNP               Sincerely,        LAY Wheatley CNP

## 2023-01-19 NOTE — PROGRESS NOTES
[facility]  [place] Medical Record Number:  [id number]  Place of Service where encounter took place:  [facility]  [facility] ([facility]) [[id number]]  Chief Complaint   Patient presents with     Anxiety       HPI:    Jewels Cortez  is a 87 year old (1935), who is being seen today for an episodic care visit.  HPI information obtained from: facility chart records, facility staff, patient report and [facility] chart review. Today's concern is: anxiety, paranoia, dementia, a-fib. S/p R hip surg, fx. Pain stable. Cont. On tylenol. Had some increased daytime sleepiness. Am risperdal dose held, cont. On hs risperdal 0.25 mg.  Has had increased daytime alertness, improved po intake. Notes to have increase in daytime agitation, crying episodes. No reports of paranoia. Has memory loss due to dementia. No reports of insomnia. Has a-fib. HR stable. No recent reports of dizziness.       Past Medical and Surgical History reviewed in Epic today.    MEDICATIONS:    Current Outpatient Medications   Medication Sig Dispense Refill     risperiDONE (RISPERDAL) 1 MG/ML solution Take 0.25 mg by mouth every morning And every day prn       acetaminophen (TYLENOL) 32 mg/mL liquid Take 650 mg by mouth every 4 hours as needed for fever or mild pain       SENEXON-S 8.6-50 MG tablet TAKE 1 TABLET BY MOUTH TWICE DAILY AS NEEDED FOR CONSTIPATION 30 tablet 11     SM ARTHRICREAM RUB 10 % external cream APPLY 1GM TOPICALLY TO RIGHT KNEE TWICE DAILY 85 g PRN         REVIEW OF SYSTEMS:  Limited secondary to cognitive impairment but today pt reports no current pain    Objective:  /76   Pulse 73   Resp 16   SpO2 94%   Exam:  GENERAL APPEARANCE:  Alert, anxious  ENT:  Mouth and posterior oropharynx normal, moist mucous membranes, [place]  EYES:  EOM, conjunctivae, lids, pupils and irises normal, PERRL  RESP:  respiratory effort and palpation of chest normal, lungs clear to auscultation , no respiratory distress  CV:   Palpation and auscultation of heart done , regular rate and rhythm, no murmur, rub, or gallop, no edema  ABDOMEN:  normal bowel sounds, soft, nontender, no hepatosplenomegaly or other masses, no guarding or rebound  M/S:   muscle strength 5/5 all 4 ext., some gen. LE weakness. kyphosis  NEURO:   Cranial nerves 2-12 are normal tested and grossly at patient's baseline, no tremor  PSYCH:  insight and judgement impaired, memory impaired , affect abnormal -flat. confusion, delusions present    Labs:     Most Recent 3 BMP's:Recent Labs   Lab Test 11/11/22  0831 11/10/22  1705 11/10/22  0633 11/08/22  0736 11/07/22  0648 11/06/22  1730 11/06/22  0745 11/04/22  0542   NA  --   --   --   --  138  --  136 141   POTASSIUM 4.1 3.8 3.4   < > 3.9 4.5 3.5 4.2   CHLORIDE  --   --   --   --  104  --  99 105   CO2  --   --   --   --  26  --  26 26   BUN  --   --   --   --  10.1  --  13.2 11.8   CR 0.36*  --   --   --  0.46* 0.39* 0.45* 0.50*   ANIONGAP  --   --   --   --  8  --  11 10   MALICK  --   --   --   --  8.3*  --  8.8 8.4*   GLC  --   --   --   --  107*  --  157* 85    < > = values in this interval not displayed.       ASSESSMENT/PLAN:  (F41.9) Anxiety  (primary encounter diagnosis)  Comment: ongoing increase in s/s.   Plan: 1. Start am risperdal  2. Cont. Prn risperdal  3. Monitor for reports of insomnia    (F22) Paranoia (H)  Comment: no recent paranoid statements or med refusal. Po intake stable  Plan: 1. risperdal as above  2. Monitor for paranoia  3. Monitor for med refusal    (F03.918) Dementia with behavioral disturbance  Comment: memory loss. Delusions. Agitation at times  Plan: 1. Change hs risperdal to am admin time  2. Cont. Prn risperdal  3. For ongoing delusions, may increase risperdal to bid  4. Cont. Ensure, monitor for wt. loss    (I48.91) Atrial fibrillation, unspecified type (H)  Comment: HR stable  Plan: 1. Follow HRs  2. Monitor for dizziness    Electronically signed by:  LAY Wheatley CNP

## 2023-02-01 NOTE — TELEPHONE ENCOUNTER
[place] [facility] NON-EMERGENT ENCOUNTER    Jewels Cortez is a 87 year old  (1935), phone call received today regarding: COVID    Disposition    Pt tested positive for COVID on 2/1/23. Pt's only symptom at this time is fatigue and lack of appetite. Vitals stable. PCP and family updated. Pt put on precautions and facility will continue to monitor per protocol.    ORDER given to SNF from Holden Block NP:     * Regular paxlovid dose 300/100mg po bid x 5 days      Electronically signed by:   Anne Marie Ward RN

## 2023-02-27 NOTE — LETTER
2/27/2023        RE: [facility]  [address]  [address]  [facility] [address]        [facility]  [place] Medical Record Number:  [id number]  Place of Service where encounter took place:  [facility] [employer]  [facility] ([facility]) [[id number]]  Chief Complaint   Patient presents with     Anxiety       HPI:    Jewels Cortez  is a 87 year old (1935), who is being seen today for an episodic care visit.  HPI information obtained from: facility chart records, facility staff, patient report and [facility] chart review. Today's concern is: anxiety, dementia, fatigue. Has dementia with memory loss. Paranoia, covid earlier this month. Had some increase in daytime sleepiness. risperdal decreased from bid to every day. Since this time has had increase in paranoia at times, more recently increase in anxiety, crying episodes. Has not had recent increase in daytime sleepiness.       Past Medical and Surgical History reviewed in Epic today.    MEDICATIONS:    Current Outpatient Medications   Medication Sig Dispense Refill     risperiDONE (RISPERDAL) 1 MG/ML solution Take 0.25 mg by mouth 2 times daily And every day prn       acetaminophen (TYLENOL) 32 mg/mL liquid Take 650 mg by mouth every 4 hours as needed for fever or mild pain       SENEXON-S 8.6-50 MG tablet TAKE 1 TABLET BY MOUTH TWICE DAILY AS NEEDED FOR CONSTIPATION 30 tablet 11     SM ARTHRICREAM RUB 10 % external cream APPLY 1GM TOPICALLY TO RIGHT KNEE TWICE DAILY 85 g PRN         REVIEW OF SYSTEMS:  Limited secondary to cognitive impairment but today pt reports `no current pain    Objective:  /79   Pulse 76   Resp 18   SpO2 92%   Exam:  GENERAL APPEARANCE:  Alert, anxious  ENT:  Mouth and posterior oropharynx normal, moist mucous membranes, [place]  EYES:  EOM, conjunctivae, lids, pupils and irises normal, PERRL  NECK:  neck flexion  RESP:  respiratory effort and palpation of chest normal, lungs clear to  auscultation , no respiratory distress  CV:  Palpation and auscultation of heart done , regular rate and rhythm, no murmur, rub, or gallop, no edema  ABDOMEN:  normal bowel sounds, soft, nontender, no hepatosplenomegaly or other masses, no guarding or rebound  M/S:   muscle strength 5/5 UEs, gen. LE weakness, severe kyphosis  NEURO:   Cranial nerves 2-12 are normal tested and grossly at patient's baseline, no tremor  PSYCH:  insight and judgement impaired, memory impaired , affect abnormal -flat. tearful at times    Labs:     Most Recent 3 BMP's:Recent Labs   Lab Test 11/11/22  0831 11/10/22  1705 11/10/22  0633 11/08/22  0736 11/07/22  0648 11/06/22  1730 11/06/22  0745 11/04/22  0542   NA  --   --   --   --  138  --  136 141   POTASSIUM 4.1 3.8 3.4   < > 3.9 4.5 3.5 4.2   CHLORIDE  --   --   --   --  104  --  99 105   CO2  --   --   --   --  26  --  26 26   BUN  --   --   --   --  10.1  --  13.2 11.8   CR 0.36*  --   --   --  0.46* 0.39* 0.45* 0.50*   ANIONGAP  --   --   --   --  8  --  11 10   MALICK  --   --   --   --  8.3*  --  8.8 8.4*   GLC  --   --   --   --  107*  --  157* 85    < > = values in this interval not displayed.       ASSESSMENT/PLAN:  (F41.9) Anxiety  (primary encounter diagnosis)  Comment: increase in s/s. Crying episodes at times  Plan: 1. Increase risperdal to bid  2. Cont. Prn risperdal  3. Reassess anxiety over next couple weeks    (F03.918) Dementia with behavioral disturbance  Comment: memory loss, paranoia at times, increased anxiety as above. Per staff, can strike out at times during cares  Plan: 1. Increase risperdal as above  2.cont. Ensure  3. Follow po intake, wt.s  4. Monitor for insomnia    (R53.83) Fatigue, unspecified type  Comment: gen. Stable. covid earlier this month. Resp. Status stable. S/p tx with paxlovid  Plan: 1. Monitor for increase in s/s given above risperdal dose increase  2. Cont. To invite to activities throughout the day  3. Cbc, bmp in next 1-3  mos    Electronically signed by:  LAY Wheatley CNP               Sincerely,        LAY Wheatley CNP

## 2023-02-27 NOTE — PROGRESS NOTES
[facility]  [place] Medical Record Number:  [id number]  Place of Service where encounter took place:  [facility]  [facility] ([facility]) [[id number]]  Chief Complaint   Patient presents with     Anxiety       HPI:    Jewels Cortez  is a 87 year old (1935), who is being seen today for an episodic care visit.  HPI information obtained from: facility chart records, facility staff, patient report and [facility] chart review. Today's concern is: anxiety, dementia, fatigue. Has dementia with memory loss. Paranoia, covid earlier this month. Had some increase in daytime sleepiness. risperdal decreased from bid to every day. Since this time has had increase in paranoia at times, more recently increase in anxiety, crying episodes. Has not had recent increase in daytime sleepiness.       Past Medical and Surgical History reviewed in Epic today.    MEDICATIONS:    Current Outpatient Medications   Medication Sig Dispense Refill     risperiDONE (RISPERDAL) 1 MG/ML solution Take 0.25 mg by mouth 2 times daily And every day prn       acetaminophen (TYLENOL) 32 mg/mL liquid Take 650 mg by mouth every 4 hours as needed for fever or mild pain       SENEXON-S 8.6-50 MG tablet TAKE 1 TABLET BY MOUTH TWICE DAILY AS NEEDED FOR CONSTIPATION 30 tablet 11     SM ARTHRICREAM RUB 10 % external cream APPLY 1GM TOPICALLY TO RIGHT KNEE TWICE DAILY 85 g PRN         REVIEW OF SYSTEMS:  Limited secondary to cognitive impairment but today pt reports `no current pain    Objective:  /79   Pulse 76   Resp 18   SpO2 92%   Exam:  GENERAL APPEARANCE:  Alert, anxious  ENT:  Mouth and posterior oropharynx normal, moist mucous membranes, [place]  EYES:  EOM, conjunctivae, lids, pupils and irises normal, PERRL  NECK:  neck flexion  RESP:  respiratory effort and palpation of chest normal, lungs clear to auscultation , no respiratory distress  CV:  Palpation and auscultation of heart done , regular rate and rhythm, no  murmur, rub, or gallop, no edema  ABDOMEN:  normal bowel sounds, soft, nontender, no hepatosplenomegaly or other masses, no guarding or rebound  M/S:   muscle strength 5/5 UEs, gen. LE weakness, severe kyphosis  NEURO:   Cranial nerves 2-12 are normal tested and grossly at patient's baseline, no tremor  PSYCH:  insight and judgement impaired, memory impaired , affect abnormal -flat. tearful at times    Labs:     Most Recent 3 BMP's:Recent Labs   Lab Test 11/11/22  0831 11/10/22  1705 11/10/22  0633 11/08/22  0736 11/07/22  0648 11/06/22  1730 11/06/22  0745 11/04/22  0542   NA  --   --   --   --  138  --  136 141   POTASSIUM 4.1 3.8 3.4   < > 3.9 4.5 3.5 4.2   CHLORIDE  --   --   --   --  104  --  99 105   CO2  --   --   --   --  26  --  26 26   BUN  --   --   --   --  10.1  --  13.2 11.8   CR 0.36*  --   --   --  0.46* 0.39* 0.45* 0.50*   ANIONGAP  --   --   --   --  8  --  11 10   MALICK  --   --   --   --  8.3*  --  8.8 8.4*   GLC  --   --   --   --  107*  --  157* 85    < > = values in this interval not displayed.       ASSESSMENT/PLAN:  (F41.9) Anxiety  (primary encounter diagnosis)  Comment: increase in s/s. Crying episodes at times  Plan: 1. Increase risperdal to bid  2. Cont. Prn risperdal  3. Reassess anxiety over next couple weeks    (F03.918) Dementia with behavioral disturbance  Comment: memory loss, paranoia at times, increased anxiety as above. Per staff, can strike out at times during cares  Plan: 1. Increase risperdal as above  2.cont. Ensure  3. Follow po intake, wt.s  4. Monitor for insomnia    (R53.83) Fatigue, unspecified type  Comment: gen. Stable. covid earlier this month. Resp. Status stable. S/p tx with paxlovid  Plan: 1. Monitor for increase in s/s given above risperdal dose increase  2. Cont. To invite to activities throughout the day  3. Cbc, bmp in next 1-3 mos    Electronically signed by:  LAY Wheatley CNP

## 2023-03-06 NOTE — PROGRESS NOTES
[facility]  [place] Medical Record Number:  [id number]  Place of Service where encounter took place:  [facility]  [facility] ([facility]) [[id number]]  Chief Complaint   Patient presents with     Pain       HPI:    Jewels Cortez  is a 87 year old (1935), who is being seen today for an episodic care visit.  HPI information obtained from: facility chart records, facility staff, patient report and [facility] chart review. Today's concern is: RLE pain, weakness, dementia. S/p R hip ORIF 11/22. Has chronic R knee pain. Has had increase in crying episodes. At times reports gen. RLE pain.  Staff reports some increased difficulty with transfers at times.  No longer amb. Uses w.c. has aspercreme to R knee, o/w no other pain meds. Has had ongoing agitation, paranoia at times. Recent increase in risperdal dose.  No reports of increase daytime sleepiness.       Past Medical and Surgical History reviewed in Epic today.    MEDICATIONS:    Current Outpatient Medications   Medication Sig Dispense Refill     acetaminophen (TYLENOL) 500 MG tablet Take 1,000 mg by mouth 2 times daily       risperiDONE (RISPERDAL) 1 MG/ML solution Take 0.25 mg by mouth 2 times daily And every day prn       SENEXON-S 8.6-50 MG tablet TAKE 1 TABLET BY MOUTH TWICE DAILY AS NEEDED FOR CONSTIPATION 30 tablet 11     SM ARTHRICREAM RUB 10 % external cream APPLY 1GM TOPICALLY TO RIGHT KNEE TWICE DAILY 85 g PRN         REVIEW OF SYSTEMS:  Limited secondary to cognitive impairment but today pt reports occ RLE pain    Objective:  /66   Pulse 78   Resp 16   SpO2 94%   Exam:  GENERAL APPEARANCE:  Alert, in no distress, cooperative  ENT:  Mouth and posterior oropharynx normal, moist mucous membranes, [place]  EYES:  EOM, conjunctivae, lids, pupils and irises normal, PERRL  RESP:  respiratory effort and palpation of chest normal, lungs clear to auscultation , no respiratory distress  CV:  Palpation and auscultation of heart done ,  regular rate and rhythm, no murmur, rub, or gallop, no edema  ABDOMEN:  normal bowel sounds, soft, nontender, no hepatosplenomegaly or other masses, no guarding or rebound  M/S:   muscle strength 5/5 UEs, gen LE weakness, severe kyphosis  NEURO:   Cranial nerves 2-12 are normal tested and grossly at patient's baseline, no tremor  PSYCH:  insight and judgement impaired, memory impaired , affect abnormal -flat    Labs:     Most Recent 3 BMP's:Recent Labs   Lab Test 11/11/22  0831 11/10/22  1705 11/10/22  0633 11/08/22  0736 11/07/22  0648 11/06/22  1730 11/06/22  0745 11/04/22  0542   NA  --   --   --   --  138  --  136 141   POTASSIUM 4.1 3.8 3.4   < > 3.9 4.5 3.5 4.2   CHLORIDE  --   --   --   --  104  --  99 105   CO2  --   --   --   --  26  --  26 26   BUN  --   --   --   --  10.1  --  13.2 11.8   CR 0.36*  --   --   --  0.46* 0.39* 0.45* 0.50*   ANIONGAP  --   --   --   --  8  --  11 10   MALICK  --   --   --   --  8.3*  --  8.8 8.4*   GLC  --   --   --   --  107*  --  157* 85    < > = values in this interval not displayed.       ASSESSMENT/PLAN:  (D30.716) Pain of right lower leg  (primary encounter diagnosis)  Comment: occ increase in s/s  Plan: 1. Cont. aspercreme to R knee  2. Start bid tylenol  3. Reassess over next week  4. For ongoing s/s consider prn tramadol-had taken after R ORIF, hip    (R2.564) Weakness of both lower extremities  Comment: deconditioning, pain as above  Plan: 1. Address pain as above  2. Cont. 1-2 staff assisted transfers  3. For further increased difficulty with transfers, refer to PT    (F00.773) Dementia with behavioral disturbance  Comment: occ crying episodes. Anxiety at times. Recent increase in sched. risperdal dose  Plan:1. Cont. Sched, prn risperdal  2. Monitor for paranoia  3. For further labile mood may consider celexa    Electronically signed by:  LAY Wheatley CNP          Documentation of Face-to-Face and Certification for Home Health Services     Patient:  Jewels Cortez   YOB: 1935  MR Number: [id number]  Today's Date: 3/6/2023    I certify that patient: Jewels Cortez is under my care and that I, or a nurse practitioner or physician's assistant working with me, had a face-to-face encounter that meets the physician face-to-face encounter requirements with this patient on: 3/6/2023.    This encounter with the patient was in whole, or in part, for the following medical condition, which is the primary reason for home health care: transfer difficulty.    I certify that, based on my findings, the following services are medically necessary home health services: Physical Therapy.    My clinical findings support the need for the above services because: Physical Therapy Services are needed to assess and treat the following functional impairments: difficulty with transfers.    Further, I certify that my clinical findings support that this patient is homebound (i.e. absences from home require considerable and taxing effort and are for medical reasons or [employer] services or infrequently or of short duration when for other reasons) because: Requires assistance of another person or specialized equipment to access medical services because patient:  non-amb...    Based on the above findings. I certify that this patient is confined to the home and needs intermittent skilled nursing care, physical therapy and/or speech therapy.  The patient is under my care, and I have initiated the establishment of the plan of care.  This patient will be followed by a physician who will periodically review the plan of care.  Physician/Provider to provide follow up care: Holden Block    Responsible Medicare certified P[employer] Physician: Amelia Merino  Physician Signature: See electronic signature associated with these discharge orders.  Date: 3/6/2023

## 2023-03-06 NOTE — LETTER
3/6/2023        RE: [facility]  [address]  [address]  [facility] [address]        [facility]  [place] Medical Record Number:  [id number]  Place of Service where encounter took place:  [facility] [employer]  [facility] ([facility]) [[id number]]  Chief Complaint   Patient presents with     Pain       HPI:    Jewels Cortez  is a 87 year old (1935), who is being seen today for an episodic care visit.  HPI information obtained from: facility chart records, facility staff, patient report and [facility] chart review. Today's concern is: RLE pain, weakness, dementia. S/p R hip ORIF 11/22. Has chronic R knee pain. Has had increase in crying episodes. At times reports gen. RLE pain.  Staff reports some increased difficulty with transfers at times.  No longer amb. Uses w.c. has aspercreme to R knee, o/w no other pain meds. Has had ongoing agitation, paranoia at times. Recent increase in risperdal dose.  No reports of increase daytime sleepiness.       Past Medical and Surgical History reviewed in Epic today.    MEDICATIONS:    Current Outpatient Medications   Medication Sig Dispense Refill     acetaminophen (TYLENOL) 500 MG tablet Take 1,000 mg by mouth 2 times daily       risperiDONE (RISPERDAL) 1 MG/ML solution Take 0.25 mg by mouth 2 times daily And every day prn       SENEXON-S 8.6-50 MG tablet TAKE 1 TABLET BY MOUTH TWICE DAILY AS NEEDED FOR CONSTIPATION 30 tablet 11     SM ARTHRICREAM RUB 10 % external cream APPLY 1GM TOPICALLY TO RIGHT KNEE TWICE DAILY 85 g PRN         REVIEW OF SYSTEMS:  Limited secondary to cognitive impairment but today pt reports occ RLE pain    Objective:  /66   Pulse 78   Resp 16   SpO2 94%   Exam:  GENERAL APPEARANCE:  Alert, in no distress, cooperative  ENT:  Mouth and posterior oropharynx normal, moist mucous membranes, [place]  EYES:  EOM, conjunctivae, lids, pupils and irises normal, PERRL  RESP:  respiratory effort and palpation of chest  normal, lungs clear to auscultation , no respiratory distress  CV:  Palpation and auscultation of heart done , regular rate and rhythm, no murmur, rub, or gallop, no edema  ABDOMEN:  normal bowel sounds, soft, nontender, no hepatosplenomegaly or other masses, no guarding or rebound  M/S:   muscle strength 5/5 UEs, gen LE weakness, severe kyphosis  NEURO:   Cranial nerves 2-12 are normal tested and grossly at patient's baseline, no tremor  PSYCH:  insight and judgement impaired, memory impaired , affect abnormal -flat    Labs:     Most Recent 3 BMP's:Recent Labs   Lab Test 11/11/22  0831 11/10/22  1705 11/10/22  0633 11/08/22  0736 11/07/22  0648 11/06/22  1730 11/06/22  0745 11/04/22  0542   NA  --   --   --   --  138  --  136 141   POTASSIUM 4.1 3.8 3.4   < > 3.9 4.5 3.5 4.2   CHLORIDE  --   --   --   --  104  --  99 105   CO2  --   --   --   --  26  --  26 26   BUN  --   --   --   --  10.1  --  13.2 11.8   CR 0.36*  --   --   --  0.46* 0.39* 0.45* 0.50*   ANIONGAP  --   --   --   --  8  --  11 10   MALICK  --   --   --   --  8.3*  --  8.8 8.4*   GLC  --   --   --   --  107*  --  157* 85    < > = values in this interval not displayed.       ASSESSMENT/PLAN:  (M68.013) Pain of right lower leg  (primary encounter diagnosis)  Comment: occ increase in s/s  Plan: 1. Cont. aspercreme to R knee  2. Start bid tylenol  3. Reassess over next week  4. For ongoing s/s consider prn tramadol-had taken after R ORIF, hip    (R24.565) Weakness of both lower extremities  Comment: deconditioning, pain as above  Plan: 1. Address pain as above  2. Cont. 1-2 staff assisted transfers  3. For further increased difficulty with transfers, refer to PT    (F00.253) Dementia with behavioral disturbance  Comment: occ crying episodes. Anxiety at times. Recent increase in sched. risperdal dose  Plan:1. Cont. Sched, prn risperdal  2. Monitor for paranoia  3. For further labile mood may consider celexa    Electronically signed by:  Holden Sanchez  LAY Block CNP          Documentation of Face-to-Face and Certification for Home Health Services     Patient: Jewels Cortez   YOB: 1935  MR Number: [id number]  Today's Date: 3/6/2023    I certify that patient: Jewels Cortez is under my care and that I, or a nurse practitioner or physician's assistant working with me, had a face-to-face encounter that meets the physician face-to-face encounter requirements with this patient on: 3/6/2023.    This encounter with the patient was in whole, or in part, for the following medical condition, which is the primary reason for home health care: transfer difficulty.    I certify that, based on my findings, the following services are medically necessary home health services: Physical Therapy.    My clinical findings support the need for the above services because: Physical Therapy Services are needed to assess and treat the following functional impairments: difficulty with transfers.    Further, I certify that my clinical findings support that this patient is homebound (i.e. absences from home require considerable and taxing effort and are for medical reasons or [employer] services or infrequently or of short duration when for other reasons) because: Requires assistance of another person or specialized equipment to access medical services because patient:  non-amb...    Based on the above findings. I certify that this patient is confined to the home and needs intermittent skilled nursing care, physical therapy and/or speech therapy.  The patient is under my care, and I have initiated the establishment of the plan of care.  This patient will be followed by a physician who will periodically review the plan of care.  Physician/Provider to provide follow up care: Holden Block    Responsible Medicare certified [place] Physician: Amelia Merino  Physician Signature: See electronic signature associated with these discharge orders.  Date:  3/6/2023              Sincerely,        LAY Wheatley CNP

## 2023-03-13 NOTE — PROGRESS NOTES
[facility]  [place] Medical Record Number:  [id number]  Place of Service where encounter took place:  [facility]  [facility] ([facility]) [[id number]]  Chief Complaint   Patient presents with     Pain       HPI:    Jewels Cortez  is a 87 year old (1935), who is being seen today for an episodic care visit.  HPI information obtained from: facility chart records, facility staff, patient report and [facility] chart review. Today's concern is: R LE pain, weakness, dementia. S/p R hip ORIF. Has chronic R knee pain. Cont. With aspercreme to R knee. Last week had increase in tylenol.  Since this time. Staff reports less reports of R LE pain.  Has been transferring better. Amb. With staff assist to bathroom. Had recent increase in risperdal dose due to paranoia, agitation, crying episodes. Mood more stable. Sig. less agitation over past week.       Past Medical and Surgical History reviewed in Epic today.    MEDICATIONS:    Current Outpatient Medications   Medication Sig Dispense Refill     acetaminophen (TYLENOL) 500 MG tablet Take 1,000 mg by mouth 2 times daily       risperiDONE (RISPERDAL) 1 MG/ML solution Take 0.25 mg by mouth 2 times daily And every day prn       SENEXON-S 8.6-50 MG tablet TAKE 1 TABLET BY MOUTH TWICE DAILY AS NEEDED FOR CONSTIPATION 30 tablet 11     SM ARTHRICREAM RUB 10 % external cream APPLY 1GM TOPICALLY TO RIGHT KNEE TWICE DAILY 85 g PRN         REVIEW OF SYSTEMS:  Limited secondary to cognitive impairment but today pt reports no current RLE pain    Objective:  /66   Pulse 68   Resp 16   SpO2 92%   Exam:  GENERAL APPEARANCE:  Alert, in no distress  ENT:  Mouth and posterior oropharynx normal, moist mucous membranes, [place]  EYES:  EOM, conjunctivae, lids, pupils and irises normal, PERRL  NECK:  neck flexion  RESP:  respiratory effort and palpation of chest normal, lungs clear to auscultation , no respiratory distress, diminished breath sounds bibasilar  CV:   Palpation and auscultation of heart done , regular rate and rhythm, no murmur, rub, or gallop, no edema  ABDOMEN:  normal bowel sounds, soft, nontender, no hepatosplenomegaly or other masses, no guarding or rebound  M/S:   muscle strength 5/5 all 4 ext, some gen. LE weakness. severe kyphosis  NEURO:   Cranial nerves 2-12 are normal tested and grossly at patient's baseline, no tremor  PSYCH:  insight and judgement impaired, memory impaired , affect and mood normal    Labs:     Most Recent 3 BMP's:Recent Labs   Lab Test 11/11/22  0831 11/10/22  1705 11/10/22  0633 11/08/22  0736 11/07/22  0648 11/06/22  1730 11/06/22  0745 11/04/22  0542   NA  --   --   --   --  138  --  136 141   POTASSIUM 4.1 3.8 3.4   < > 3.9 4.5 3.5 4.2   CHLORIDE  --   --   --   --  104  --  99 105   CO2  --   --   --   --  26  --  26 26   BUN  --   --   --   --  10.1  --  13.2 11.8   CR 0.36*  --   --   --  0.46* 0.39* 0.45* 0.50*   ANIONGAP  --   --   --   --  8  --  11 10   MALICK  --   --   --   --  8.3*  --  8.8 8.4*   GLC  --   --   --   --  107*  --  157* 85    < > = values in this interval not displayed.       ASSESSMENT/PLAN:  (M98.595) Pain of right lower leg  (primary encounter diagnosis)  Comment: improved. Increased tylenol dose  Plan: 1. Cont. Bid, prn tylenol  2. Cont. aspercreme to R knee  3. For further increase in pain, consider prn tramadol    (R29.048) Weakness of both lower extremities  Comment: improved. Amb. To bathroom with assist  Plan: 1. Cont. Assisted amb to bathroom  2. amb with staff assist throughout the days as roseann  3. For further increased difficulty with transfers, may refer to PT    (F03.098) Dementia with behavioral disturbance  Comment: memory loss. Recent increase in risperdal. Less agitation. Mood more stable  Plan: 1. Cont. Bid, prn risperdal  2. Monitor for paranoia  3. Follow po intake, wt.s  4. For further increased anxiety, crying episodes, may consider antidepressant     Electronically signed by:  Holden  LAY Conklin CNP

## 2023-03-13 NOTE — LETTER
3/13/2023        RE: [facility]  [address]  [address]  [facility] [address]        [facility]  [place] Medical Record Number:  [id number]  Place of Service where encounter took place:  [facility] [employer]  [facility] ([facility]) [[id number]]  Chief Complaint   Patient presents with     Pain       HPI:    Jewels Cortez  is a 87 year old (1935), who is being seen today for an episodic care visit.  HPI information obtained from: facility chart records, facility staff, patient report and [facility] chart review. Today's concern is: R LE pain, weakness, dementia. S/p R hip ORIF. Has chronic R knee pain. Cont. With aspercreme to R knee. Last week had increase in tylenol.  Since this time. Staff reports less reports of R LE pain.  Has been transferring better. Amb. With staff assist to bathroom. Had recent increase in risperdal dose due to paranoia, agitation, crying episodes. Mood more stable. Sig. less agitation over past week.       Past Medical and Surgical History reviewed in Epic today.    MEDICATIONS:    Current Outpatient Medications   Medication Sig Dispense Refill     acetaminophen (TYLENOL) 500 MG tablet Take 1,000 mg by mouth 2 times daily       risperiDONE (RISPERDAL) 1 MG/ML solution Take 0.25 mg by mouth 2 times daily And every day prn       SENEXON-S 8.6-50 MG tablet TAKE 1 TABLET BY MOUTH TWICE DAILY AS NEEDED FOR CONSTIPATION 30 tablet 11     SM ARTHRICREAM RUB 10 % external cream APPLY 1GM TOPICALLY TO RIGHT KNEE TWICE DAILY 85 g PRN         REVIEW OF SYSTEMS:  Limited secondary to cognitive impairment but today pt reports no current RLE pain    Objective:  /66   Pulse 68   Resp 16   SpO2 92%   Exam:  GENERAL APPEARANCE:  Alert, in no distress  ENT:  Mouth and posterior oropharynx normal, moist mucous membranes, [place]  EYES:  EOM, conjunctivae, lids, pupils and irises normal, PERRL  NECK:  neck flexion  RESP:  respiratory effort and palpation of  chest normal, lungs clear to auscultation , no respiratory distress, diminished breath sounds bibasilar  CV:  Palpation and auscultation of heart done , regular rate and rhythm, no murmur, rub, or gallop, no edema  ABDOMEN:  normal bowel sounds, soft, nontender, no hepatosplenomegaly or other masses, no guarding or rebound  M/S:   muscle strength 5/5 all 4 ext, some gen. LE weakness. severe kyphosis  NEURO:   Cranial nerves 2-12 are normal tested and grossly at patient's baseline, no tremor  PSYCH:  insight and judgement impaired, memory impaired , affect and mood normal    Labs:     Most Recent 3 BMP's:Recent Labs   Lab Test 11/11/22  0831 11/10/22  1705 11/10/22  0633 11/08/22  0736 11/07/22  0648 11/06/22  1730 11/06/22  0745 11/04/22  0542   NA  --   --   --   --  138  --  136 141   POTASSIUM 4.1 3.8 3.4   < > 3.9 4.5 3.5 4.2   CHLORIDE  --   --   --   --  104  --  99 105   CO2  --   --   --   --  26  --  26 26   BUN  --   --   --   --  10.1  --  13.2 11.8   CR 0.36*  --   --   --  0.46* 0.39* 0.45* 0.50*   ANIONGAP  --   --   --   --  8  --  11 10   MALICK  --   --   --   --  8.3*  --  8.8 8.4*   GLC  --   --   --   --  107*  --  157* 85    < > = values in this interval not displayed.       ASSESSMENT/PLAN:  (C13.885) Pain of right lower leg  (primary encounter diagnosis)  Comment: improved. Increased tylenol dose  Plan: 1. Cont. Bid, prn tylenol  2. Cont. aspercreme to R knee  3. For further increase in pain, consider prn tramadol    (R25.913) Weakness of both lower extremities  Comment: improved. Amb. To bathroom with assist  Plan: 1. Cont. Assisted amb to bathroom  2. amb with staff assist throughout the days as roseann  3. For further increased difficulty with transfers, may refer to PT    (F02.762) Dementia with behavioral disturbance  Comment: memory loss. Recent increase in risperdal. Less agitation. Mood more stable  Plan: 1. Cont. Bid, prn risperdal  2. Monitor for paranoia  3. Follow po intake, wt.s  4. For  further increased anxiety, crying episodes, may consider antidepressant     Electronically signed by:  LAY Wheatley CNP               Sincerely,        LAY Wheatley CNP

## 2023-03-27 NOTE — LETTER
3/27/2023        RE: [facility]  [address]  [address]  [facility] [address]        [place] [facility]  [place] Medical Record Number:  [id number]  Place of Service where encounter took place:  [facility] [employer]  [facility] ([facility]) [[id number]]  Chief Complaint   Patient presents with     Anxiety       HPI:    Jewels Cortez  is a 87 year old (1935), who is being seen today for an episodic care visit.  HPI information obtained from: facility chart records, facility staff, patient report and [facility] chart review. Today's concern is: anxiety, dementia, weakness. For dementia cont. On ripserdal. Ongoing anxiety at times, calls out for staff at times, occ tearful. Some decrease in target behaviors since increased risperdal dose 2/28/23. ongoingLE weakness. Does amb. Short distances at times. Does not always ask for assist.  No recent falls. Since start of tylenol, LE pain more stable.       Past Medical and Surgical History reviewed in Epic today.    MEDICATIONS:    Current Outpatient Medications   Medication Sig Dispense Refill     ACETAMINOPHEN EXTRA STRENGTH 500 MG tablet TAKE TWO TABLETS (1000MG) BY MOUTH TWICE DAILY *OK TO CRUSH* 360 tablet 97     risperiDONE (RISPERDAL) 1 MG/ML solution TAKE 0.25ML (0.25MG) BY MOUTH TWICE DAILY;& MAY TAKE 0.25ML (0.25MG) ONCE DAILY AS NEEDED 30 mL 97     SENEXON-S 8.6-50 MG tablet TAKE 1 TABLET BY MOUTH TWICE DAILY AS NEEDED FOR CONSTIPATION 30 tablet 11     SM ARTHRICREAM RUB 10 % external cream APPLY 1GM TOPICALLY TO RIGHT KNEE TWICE DAILY 85 g PRN         REVIEW OF SYSTEMS:  Limited secondary to cognitive impairment but today pt reports no current LE pain.     Objective:  /77   Pulse 78   Resp 18   SpO2 93%   Exam:  GENERAL APPEARANCE:  Alert, cooperative  ENT:  Mouth and posterior oropharynx normal, moist mucous membranes, [place]  EYES:  EOM, conjunctivae, lids, pupils and irises normal, PERRL  RESP:  respiratory effort  and palpation of chest normal, lungs clear to auscultation , no respiratory distress, diminished breath sounds bibasilar  CV:  Palpation and auscultation of heart done , regular rate and rhythm, no murmur, rub, or gallop, no edema  ABDOMEN:  normal bowel sounds, soft, nontender, no hepatosplenomegaly or other masses, no guarding or rebound  M/S:   muscle strength 5/5 UEs, gen LE weakness. kyphosis  NEURO:   Cranial nerves 2-12 are normal tested and grossly at patient's baseline, no tremor  PSYCH:  insight and judgement impaired, affect abnormal -flat    Labs:     Most Recent 3 BMP's:Recent Labs   Lab Test 11/11/22  0831 11/10/22  1705 11/10/22  0633 11/08/22  0736 11/07/22  0648 11/06/22  1730 11/06/22  0745 11/04/22  0542   NA  --   --   --   --  138  --  136 141   POTASSIUM 4.1 3.8 3.4   < > 3.9 4.5 3.5 4.2   CHLORIDE  --   --   --   --  104  --  99 105   CO2  --   --   --   --  26  --  26 26   BUN  --   --   --   --  10.1  --  13.2 11.8   CR 0.36*  --   --   --  0.46* 0.39* 0.45* 0.50*   ANIONGAP  --   --   --   --  8  --  11 10   MALICK  --   --   --   --  8.3*  --  8.8 8.4*   GLC  --   --   --   --  107*  --  157* 85    < > = values in this interval not displayed.       ASSESSMENT/PLAN:  (F41.9) Anxiety  (primary encounter diagnosis)  Comment: ongoing s/s.  Plan: 1. Monitor for increase in crying episodes, calling out behaviors  2. Cont. To encourage participation in activities throughout the day  3. For ongoing s/s consider remeron    (F03.918) Dementia with behavioral disturbance (H)  Comment: memory loss. No recent paranoia.  Plan: 1. Cont sched., prn risperdal  2. For increase in agitation, use prn risperdal  3. For increase agitation, may increase am risperdal dose    (R2.783) Weakness of both lower extremities  Comment: occ amb. No recent increase difficulty with transfers  Plan: 1. Cont. Tylenol for pain  2. Remind to ask for assist with transfers  3. Cont. Sched. Assists to bathroom    Electronically  signed by:  LAY Wheatley CNP               Sincerely,        LAY Wheatley CNP

## 2023-04-03 NOTE — PROGRESS NOTES
[place] [facility]  [place] [facility] Record Number:  [id number]  Place of Service where encounter took place:  [facility]  [facility] ([facility]) [[id number]]  Chief Complaint   Patient presents with     Derm Problem       HPI:    Jewels Cortez  is a 87 year old (1935), who is being seen today for an episodic care visit.  HPI information obtained from: facility chart records, facility staff, patient report and [facility] chart review. Today's concern is: R heel ulcer, edema, a-fib. Has newer formation of R heel ulcer. Started as blister, now ruptured. No recent increase in LE edema. Has h/o R LE edema, compression stockings. Ulcer likely r/t pressure given location.  Has drsg to site, has heels floated off bed recently. Has a-fib.  HR stable. No reports of resp. Distress, c.p., or dizziness. Not anticoagulated due to med refusal. Also has h/o falls.      Past Medical and Surgical History reviewed in Epic today.    MEDICATIONS:    Current Outpatient Medications   Medication Sig Dispense Refill     ACETAMINOPHEN EXTRA STRENGTH 500 MG tablet TAKE TWO TABLETS (1000MG) BY MOUTH TWICE DAILY *OK TO CRUSH* 360 tablet 97     risperiDONE (RISPERDAL) 1 MG/ML solution TAKE 0.25ML (0.25MG) BY MOUTH TWICE DAILY;& MAY TAKE 0.25ML (0.25MG) ONCE DAILY AS NEEDED 30 mL 97     SENEXON-S 8.6-50 MG tablet TAKE 1 TABLET BY MOUTH TWICE DAILY AS NEEDED FOR CONSTIPATION 30 tablet 11     SM ARTHRICREAM RUB 10 % external cream APPLY 1GM TOPICALLY TO RIGHT KNEE TWICE DAILY 85 g PRN         REVIEW OF SYSTEMS:  Limited secondary to cognitive impairment but today pt reports no pain of R heel    Objective:  /65   Pulse 78   Resp 18   SpO2 93%   Exam:  GENERAL APPEARANCE:  Alert, in no distress, cooperative  ENT:  Mouth and posterior oropharynx normal, moist mucous membranes, [place]  EYES:  EOM, conjunctivae, lids, pupils and irises normal, PERRL  RESP:  respiratory effort and palpation of chest normal, lungs clear to  auscultation , no respiratory distress, diminished breath sounds bibasilar  CV:  Palpation and auscultation of heart done , regular rate and rhythm, no murmur, rub, or gallop, no edema  ABDOMEN:  normal bowel sounds, soft, nontender, no hepatosplenomegaly or other masses, no guarding or rebound  M/S:   muscle strength 5/5 UEs, some gen. LE weakness. normal tone  SKIN:  R heal ulcer. 2.5x1cm. stage 3, some tan drainage  NEURO:   Cranial nerves 2-12 are normal tested and grossly at patient's baseline, no tremor  PSYCH:  insight and judgement impaired, memory impaired , affect and mood normal    Labs:     Most Recent 3 CBC's:Recent Labs   Lab Test 11/29/22  1205 11/15/22  1359 11/10/22  0633 11/09/22  0700 11/08/22  0736 11/07/22  1702 11/07/22  0648 11/06/22  0745   WBC  --   --   --   --  9.9  --  8.4 15.9*   HGB 11.0* 9.5* 9.8*  --  7.0*   < > 7.4* 9.3*   MCV  --   --   --   --  88  --  88 86   PLT  --   --   --  424 340  --  331 384    < > = values in this interval not displayed.     Most Recent 3 BMP's:Recent Labs   Lab Test 11/11/22  0831 11/10/22  1705 11/10/22  0633 11/08/22  0736 11/07/22  0648 11/06/22  1730 11/06/22  0745 11/04/22  0542   NA  --   --   --   --  138  --  136 141   POTASSIUM 4.1 3.8 3.4   < > 3.9 4.5 3.5 4.2   CHLORIDE  --   --   --   --  104  --  99 105   CO2  --   --   --   --  26  --  26 26   BUN  --   --   --   --  10.1  --  13.2 11.8   CR 0.36*  --   --   --  0.46* 0.39* 0.45* 0.50*   ANIONGAP  --   --   --   --  8  --  11 10   MALICK  --   --   --   --  8.3*  --  8.8 8.4*   GLC  --   --   --   --  107*  --  157* 85    < > = values in this interval not displayed.       ASSESSMENT/PLAN:  (X30.687) Heel ulcer, right, with fat layer exposed (H)  (primary encounter diagnosis)  Comment: new onset, pressure ulcer. Blister now ruptured.  Plan: 1. Refer to HC RN  2. Cont. drsg to site, change every day, prn  3. Cont. To float heels off bed  4. Monitor for infection    (R60.0) Leg edema,  right  Comment: currently stable. Recent R heel blister as above  Plan: 1. Elevate LEs, offload throughout the day  2. For increase in LE edema, restart compression stockings  3. Bmp in next 1-3 mos    (I48.91) Atrial fibrillation, unspecified type (H)  Comment: HR stable. No reports of dizziness or resp. distress  Plan: 1. Follow HRs  2. Monitor for further falls  3. Encourage fluids  4. hgb in next 1-3 mos    Electronically signed by:  LAY Wheatley CNP         Documentation of Face-to-Face and Certification for Home Health Services     Patient: Jewels Cortez   YOB: 1935  MR Number: [id number]  Today's Date: 4/3/2023    I certify that patient: Jewels Cortez is under my care and that I, or a nurse practitioner or physician's assistant working with me, had a face-to-face encounter that meets the physician face-to-face encounter requirements with this patient on: 4/3/2023.    This encounter with the patient was in whole, or in part, for the following medical condition, which is the primary reason for home health care: R heel ulcer.    I certify that, based on my findings, the following services are medically necessary home health services: Nursing.    My clinical findings support the need for the above services because: Nurse is needed: tessy ross R heel ulcer.    Further, I certify that my clinical findings support that this patient is homebound (i.e. absences from home require considerable and taxing effort and are for medical reasons or [employer] services or infrequently or of short duration when for other reasons) because: Requires assistance of another person or specialized equipment to access medical services because patient:  requires assist with transfers..    Based on the above findings. I certify that this patient is confined to the home and needs intermittent skilled nursing care, physical therapy and/or speech therapy.  The patient is under my care, and I have initiated the  establishment of the plan of care.  This patient will be followed by a physician who will periodically review the plan of care.  Physician/Provider to provide follow up care: Holden Block    Responsible Medicare certified PECOS Physician: Amelia Merino  Physician Signature: See electronic signature associated with these discharge orders.  Date: 4/3/2023

## 2023-04-03 NOTE — LETTER
4/3/2023        RE: [facility]  [address]  [address]  [facility] [address]        [place] [facility]  [place] [facility] Record Number:  [id number]  Place of Service where encounter took place:  [facility]) [[id number]]  Chief Complaint   Patient presents with     Derm Problem       HPI:    Jewels Cortez  is a 87 year old (1935), who is being seen today for an episodic care visit.  HPI information obtained from: facility chart records, facility staff, patient report and [facility] chart review. Today's concern is: R heel ulcer, edema, a-fib. Has newer formation of R heel ulcer. Started as blister, now ruptured. No recent increase in LE edema. Has h/o R LE edema, compression stockings. Ulcer likely r/t pressure given location.  Has drsg to site, has heels floated off bed recently. Has a-fib.  HR stable. No reports of resp. Distress, c.p., or dizziness. Not anticoagulated due to med refusal. Also has h/o falls.      Past Medical and Surgical History reviewed in Epic today.    MEDICATIONS:    Current Outpatient Medications   Medication Sig Dispense Refill     ACETAMINOPHEN EXTRA STRENGTH 500 MG tablet TAKE TWO TABLETS (1000MG) BY MOUTH TWICE DAILY *OK TO CRUSH* 360 tablet 97     risperiDONE (RISPERDAL) 1 MG/ML solution TAKE 0.25ML (0.25MG) BY MOUTH TWICE DAILY;& MAY TAKE 0.25ML (0.25MG) ONCE DAILY AS NEEDED 30 mL 97     SENEXON-S 8.6-50 MG tablet TAKE 1 TABLET BY MOUTH TWICE DAILY AS NEEDED FOR CONSTIPATION 30 tablet 11     SM ARTHRICREAM RUB 10 % external cream APPLY 1GM TOPICALLY TO RIGHT KNEE TWICE DAILY 85 g PRN         REVIEW OF SYSTEMS:  Limited secondary to cognitive impairment but today pt reports no pain of R heel    Objective:  /65   Pulse 78   Resp 18   SpO2 93%   Exam:  GENERAL APPEARANCE:  Alert, in no distress, cooperative  ENT:  Mouth and posterior oropharynx normal, moist mucous membranes, [place]  EYES:  EOM, conjunctivae,  lids, pupils and irises normal, PERRL  RESP:  respiratory effort and palpation of chest normal, lungs clear to auscultation , no respiratory distress, diminished breath sounds bibasilar  CV:  Palpation and auscultation of heart done , regular rate and rhythm, no murmur, rub, or gallop, no edema  ABDOMEN:  normal bowel sounds, soft, nontender, no hepatosplenomegaly or other masses, no guarding or rebound  M/S:   muscle strength 5/5 UEs, some gen. LE weakness. normal tone  SKIN:  R heal ulcer. 2.5x1cm. stage 3, some tan drainage  NEURO:   Cranial nerves 2-12 are normal tested and grossly at patient's baseline, no tremor  PSYCH:  insight and judgement impaired, memory impaired , affect and mood normal    Labs:     Most Recent 3 CBC's:Recent Labs   Lab Test 11/29/22  1205 11/15/22  1359 11/10/22  0633 11/09/22  0700 11/08/22  0736 11/07/22  1702 11/07/22  0648 11/06/22  0745   WBC  --   --   --   --  9.9  --  8.4 15.9*   HGB 11.0* 9.5* 9.8*  --  7.0*   < > 7.4* 9.3*   MCV  --   --   --   --  88  --  88 86   PLT  --   --   --  424 340  --  331 384    < > = values in this interval not displayed.     Most Recent 3 BMP's:Recent Labs   Lab Test 11/11/22  0831 11/10/22  1705 11/10/22  0633 11/08/22  0736 11/07/22  0648 11/06/22  1730 11/06/22  0745 11/04/22  0542   NA  --   --   --   --  138  --  136 141   POTASSIUM 4.1 3.8 3.4   < > 3.9 4.5 3.5 4.2   CHLORIDE  --   --   --   --  104  --  99 105   CO2  --   --   --   --  26  --  26 26   BUN  --   --   --   --  10.1  --  13.2 11.8   CR 0.36*  --   --   --  0.46* 0.39* 0.45* 0.50*   ANIONGAP  --   --   --   --  8  --  11 10   MALICK  --   --   --   --  8.3*  --  8.8 8.4*   GLC  --   --   --   --  107*  --  157* 85    < > = values in this interval not displayed.       ASSESSMENT/PLAN:  (O78.042) Heel ulcer, right, with fat layer exposed (H)  (primary encounter diagnosis)  Comment: new onset, pressure ulcer. Blister now ruptured.  Plan: 1. Refer to HC RN  2. Cont. drsg to site,  change every day, prn  3. Cont. To float heels off bed  4. Monitor for infection    (R60.0) Leg edema, right  Comment: currently stable. Recent R heel blister as above  Plan: 1. Elevate LEs, offload throughout the day  2. For increase in LE edema, restart compression stockings  3. Bmp in next 1-3 mos    (I48.91) Atrial fibrillation, unspecified type (H)  Comment: HR stable. No reports of dizziness or resp. distress  Plan: 1. Follow HRs  2. Monitor for further falls  3. Encourage fluids  4. hgb in next 1-3 mos    Electronically signed by:  LAY Wheatley CNP         Documentation of Face-to-Face and Certification for Home Health Services     Patient: Jewels Cortez   YOB: 1935  MR Number: [id number]  Today's Date: 4/3/2023    I certify that patient: Jewels Cortez is under my care and that I, or a nurse practitioner or physician's assistant working with me, had a face-to-face encounter that meets the physician face-to-face encounter requirements with this patient on: 4/3/2023.    This encounter with the patient was in whole, or in part, for the following medical condition, which is the primary reason for home health care: R heel ulcer.    I certify that, based on my findings, the following services are medically necessary home health services: Nursing.    My clinical findings support the need for the above services because: Nurse is needed: tessy ross R heel ulcer.    Further, I certify that my clinical findings support that this patient is homebound (i.e. absences from home require considerable and taxing effort and are for medical reasons or [facility] services or infrequently or of short duration when for other reasons) because: Requires assistance of another person or specialized equipment to access medical services because patient:  requires assist with transfers..    Based on the above findings. I certify that this patient is confined to the home and needs intermittent skilled nursing  care, physical therapy and/or speech therapy.  The patient is under my care, and I have initiated the establishment of the plan of care.  This patient will be followed by a physician who will periodically review the plan of care.  Physician/Provider to provide follow up care: Holden Block    Responsible Medicare certified PEC Physician: Amelia Merino  Physician Signature: See electronic signature associated with these discharge orders.  Date: 4/3/2023              Sincerely,        LAY Wheatley CNP

## 2023-04-13 NOTE — LETTER
4/13/2023        RE: [facility]  [address]  [address]  [facility] [address]        [place] [facility]  [place] [facility] Record Number:  [id number]  Place of Service where encounter took place:  [facility] [employer] [facility]  [facility] ([facility]) [[id number]]  Chief Complaint   Patient presents with     RECHECK     Derm Problem       HPI:    Jewels Cortez  is a 88 year old (1935), who is being seen today for an episodic care visit.  HPI information obtained from: facility chart records, facility staff, patient report, [facility] chart review and family/first contact neice  report. Today's concern is: R heel pressure ulcer, LE weakness, dementia. Has decreased mobility. Limited amb. Has had pressure ulcer to R heel, started as blister, ruptured. Followed by HC RN. Has redness to buttocks at times. Gen. Weakness, requires assist to reposition in bed. Severe kyphosis. For dementia cont. On risperdal. No recent increase in paranoia or calling out behaviors.  Has had ongoing crying episodes during the day.       Past Medical and Surgical History reviewed in Epic today.    MEDICATIONS:    Current Outpatient Medications   Medication Sig Dispense Refill     citalopram (CELEXA) 10 MG tablet Take 10 mg by mouth daily       risperiDONE (RISPERDAL) 0.25 MG tablet Take 0.25 mg by mouth daily And every day prn       ACETAMINOPHEN EXTRA STRENGTH 500 MG tablet TAKE TWO TABLETS (1000MG) BY MOUTH TWICE DAILY *OK TO CRUSH* 360 tablet 97     Nutritional Supplements (ENSURE) LIQD DRINK 1 BOTTLE BY MOUTH DAILY 237 mL 11     SENEXON-S 8.6-50 MG tablet TAKE 1 TABLET BY MOUTH TWICE DAILY AS NEEDED FOR CONSTIPATION 30 tablet 11     SM ARTHRICREAM RUB 10 % external cream APPLY 1GM TOPICALLY TO RIGHT KNEE TWICE DAILY 85 g PRN         REVIEW OF SYSTEMS:  Limited secondary to cognitive impairment but today pt reports feeling ok    Objective:  /66   Pulse 78   Temp 97.2  F (36.2  C)   Resp 16   Ht 1.575  "m (5' 2\")   Wt 40.4 kg (89 lb)   SpO2 94%   BMI 16.28 kg/m    Exam:  GENERAL APPEARANCE:  Alert, in no distress, thin, cooperative  ENT:  Mouth and posterior oropharynx normal, moist mucous membranes, [place]  EYES:  EOM, conjunctivae, lids, pupils and irises normal, PERRL  NECK:  sl neck flexion  RESP:  respiratory effort and palpation of chest normal, lungs clear to auscultation , no respiratory distress, diminished breath sounds bibasilar  CV:  Palpation and auscultation of heart done , regular rate and rhythm, no murmur, rub, or gallop, no edema  ABDOMEN:  normal bowel sounds, soft, nontender, no hepatosplenomegaly or other masses, no guarding or rebound  M/S:   muscle strength 5/5 UEs, 4/5 LEs, severe kyphosis  SKIN:  R heel with area of dried, peeling skin, no current open area  NEURO:   Cranial nerves 2-12 are normal tested and grossly at patient's baseline, no tremor  PSYCH:  insight and judgement impaired, memory impaired , affect abnormal -flat    Labs:     Most Recent 3 BMP's:Recent Labs   Lab Test 11/11/22  0831 11/10/22  1705 11/10/22  0633 11/08/22  0736 11/07/22  0648 11/06/22  1730 11/06/22  0745 11/04/22  0542   NA  --   --   --   --  138  --  136 141   POTASSIUM 4.1 3.8 3.4   < > 3.9 4.5 3.5 4.2   CHLORIDE  --   --   --   --  104  --  99 105   CO2  --   --   --   --  26  --  26 26   BUN  --   --   --   --  10.1  --  13.2 11.8   CR 0.36*  --   --   --  0.46* 0.39* 0.45* 0.50*   ANIONGAP  --   --   --   --  8  --  11 10   MALICK  --   --   --   --  8.3*  --  8.8 8.4*   GLC  --   --   --   --  107*  --  157* 85    < > = values in this interval not displayed.       ASSESSMENT/PLAN:  (L89.612) Pressure ulcer of right heel, stage 2 (H)  (primary encounter diagnosis)  Comment: healing, dry skin, flaking present. Buttock redness, severe kyphosis  Plan: 1. Order fully elect. Hosp. Bed with mattress overlay  2. Float heels off bed at s.  3. Cont. HC RN, monitor heels for open areas    (R27.458) Weakness of both " lower extremities  Comment: ongoing. Minimal amb. Assist with transfers, assist with bed repositioning. Difficulty standing from seated position.  Plan: 1. Cont. Assisted transfer to w.c.  2. Niece stated she will be bringing in specialty cushion for w.c.  3. Cont. Tylenol for LE pain    (F03.918) Dementia with behavioral disturbance (H)  Comment: no recent increase in paranoia, agitation. Ongoing crying episodes throughout the day  Plan: 1. Decrease sched. risperdal to 0.25 mg at bedtime. Cont. Prn risperdal  2. Start celexa 10 mg every day  3. Reassess mood, behaviors over next couple weeks      Electronically signed by:  LAY Wheatley CNP       Face to Face and Medical Necessity Statement for DME Provider visit    Demographic Information on Jewels Cotrez:  Gender: female  : 1935  [facility]  [address]    [facility] [address]  [phone] (home) [phone] (work)    Medical Record: [id number]  Social Security Number: [id number]  Primary Care Provider: Holden Block  Insurance: Payor: MEDICARE / Plan: MEDICARE / Product Type: Medicare /     HPI:   Jewels Cortez is a 88 year old  (1935), who is being seen today for a face to face provider visit at [facility]; medical necessity statement for DME included. This patient requires the following:  DME Ordered and Medical Necessity Statement   The patient does  require positioning of the body in ways not feasible with an ordinary bed due to a medical condition that is expected to last at least 1 month due to r heel pressure ulcer, severe kyphosis.   The patient does  require, for the alleviation of pain, postioning of the body in ways not feasible with an ordinary bed.   The patient does not require the head of bed elevated more than 30* most of the time due to CHF, chronic pulmonary disease or aspiration.  The patient does not require traction that can only be attached to a hospital bed.  The patient does   "require a bed height different than a fixed height hospital bed to permit tranfers to wheelchair or standing position.   The patient does  require frequent or immediate changes in body position due to pain. Pressure ulcer.       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        EXAM  Vitals: /66   Pulse 78   Temp 97.2  F (36.2  C)   Resp 16   Ht 1.575 m (5' 2\")   Wt 40.4 kg (89 lb)   SpO2 94%   BMI 16.28 kg/m  ;BMI= Body mass index is 16.28 kg/m .      ASSESSMENT/PLAN:  1. Pressure ulcer of right heel, stage 2 (H)    2. Weakness of both lower extremities    3. Dementia with behavioral disturbance (H)        Orders: Fully electric hospital bed with specialty overlay for mattress  1. Facility staff/TC to contact DME company to get their order form for provider to fill out    ELECTRONICALLY SIGNED BY AALIYAH CERTIFIED PROVIDER:  LAY Wheatley CNP   NPI: [id number]  [place] [facility]  [address] [place] [address], [address]  [place] [address]                    Sincerely,        LAY Wheatley CNP      "

## 2023-04-28 NOTE — TELEPHONE ENCOUNTER
Facility misplaced/lost supply of Risperidone liquid solution and is requesting to change to tablet form of medication going forward.    ORDERS GIVEN:   - discontinue Risperidone solution  - Risperidone 0.5mg tablets Take 0.25mg by mouth daily at HS and may take 0.25mg by mouth once daily PRN for anxiety    Provider giving order: LAY Dickson CNP    Order given to: Tacho Ordaz RN

## 2023-05-08 PROBLEM — E46 PROTEIN-CALORIE MALNUTRITION (H): Status: ACTIVE | Noted: 2023-01-01

## 2023-05-08 NOTE — LETTER
5/8/2023        RE: [facility]  [address]  [address]  [facility] [address]        [place] [facility]  [place] Medical Record Number:  [id number]  Place of Service where encounter took place:  [facility] ([facility]) [[id number]]  Chief Complaint   Patient presents with     Anxiety       HPI:    Jewels Cortez  is a 88 year old (1935), who is being seen today for an episodic care visit.  HPI information obtained from: facility chart records, facility staff, [facility] chart review and family/first contact niece report. Today's concern is: anxiety, pain, wt. Loss. Has dementia. Labile mood, freq. Crying, calling out at times. Started on celexa last month which has not been effective.  Cont. On sched, prn risperdal. Has chronic pain-low back, R knee. Cont. On tylenol, aspercreme. No longer. Amb. Assist with transfers.  Has had further decrease in po intake. Cont., on Ensure. Down 26 lbs since last fall. Wt in 11/22 104 lbs.       Past Medical and Surgical History reviewed in Epic today.    MEDICATIONS:    Current Outpatient Medications   Medication Sig Dispense Refill     ACETAMINOPHEN EXTRA STRENGTH 500 MG tablet TAKE TWO TABLETS (1000MG) BY MOUTH TWICE DAILY *OK TO CRUSH* 360 tablet 97     citalopram (CELEXA) 10 MG tablet Take 10 mg by mouth daily       Nutritional Supplements (ENSURE) LIQD DRINK 1 BOTTLE BY MOUTH DAILY 237 mL 11     risperiDONE (RISPERDAL) 0.5 MG tablet Take 0.5 tablets (0.25 mg) by mouth At Bedtime. May also take 0.5 tablets (0.25 mg) daily as needed. 30 tablet 11     risperiDONE (RISPERDAL) 1 MG/ML solution TAKE 0.25ML (0.25MG) BY MOUTH EVERY NIGHT AT BEDTIME;& MAY TAKE 0.25ML (0.25MG) ONCE DAILY AS NEEDED 30 mL 11     SENEXON-S 8.6-50 MG tablet TAKE 1 TABLET BY MOUTH TWICE DAILY AS NEEDED FOR CONSTIPATION 30 tablet 11     SM ARTHRICREAM RUB 10 % external cream APPLY 1GM TOPICALLY TO RIGHT KNEE TWICE DAILY 85 g PRN         REVIEW OF  SYSTEMS:  Unobtainable secondary to cognitive impairment.     Objective:  /79   Pulse 82   Resp 18   Wt 35.4 kg (78 lb)   SpO2 93%   BMI 14.27 kg/m    Exam:  GENERAL APPEARANCE:  in no distress, thin, cooperative  ENT:  Mouth and posterior oropharynx normal, moist mucous membranes, [place]  EYES:  EOM, conjunctivae, lids, pupils and irises normal, PERRL  NECK:  some neck flexion  RESP:  respiratory effort and palpation of chest normal, lungs clear to auscultation , no respiratory distress, diminished breath sounds bibasilar  CV:  Palpation and auscultation of heart done , regular rate and rhythm, no murmur, rub, or gallop, no edema  ABDOMEN:  normal bowel sounds, soft, nontender, no hepatosplenomegaly or other masses, no guarding or rebound  M/S:   muscle strength 5/5 UEs, gen. LE weakness. severe kyphosis  NEURO:   Cranial nerves 2-12 are normal tested and grossly at patient's baseline, no tremor  PSYCH:  affect abnormal -flat, no apparent anxiety    Labs:     Most Recent 3 BMP's:Recent Labs   Lab Test 11/11/22  0831 11/10/22  1705 11/10/22  0633 11/08/22  0736 11/07/22  0648 11/06/22  1730 11/06/22  0745 11/04/22  0542   NA  --   --   --   --  138  --  136 141   POTASSIUM 4.1 3.8 3.4   < > 3.9 4.5 3.5 4.2   CHLORIDE  --   --   --   --  104  --  99 105   CO2  --   --   --   --  26  --  26 26   BUN  --   --   --   --  10.1  --  13.2 11.8   CR 0.36*  --   --   --  0.46* 0.39* 0.45* 0.50*   ANIONGAP  --   --   --   --  8  --  11 10   MALICK  --   --   --   --  8.3*  --  8.8 8.4*   GLC  --   --   --   --  107*  --  157* 85    < > = values in this interval not displayed.       ASSESSMENT/PLAN:  (F41.9) Anxiety  (primary encounter diagnosis)  Comment: ongoing increased s/s  Plan: 1. Cont. celexa  2. Cont. Sched, prn risperdal  3. For calling out behaviors use prn risperdal    (R52) Pain  Comment: difficult to assess given cog. Decline. Decrease act. Level. Severe kyphosis. Chronic R knee, low back pain  Plan: 1.  Cont. Tylenol  2. Cont. aspercreme  3. May consider tramadol  4. For increased difficulty with transfers may need Jacoby lift.  5. Awaiting hosp. bed    (E43) Severe protein-calorie malnutrition (H)  Comment: ongoing decrease in po intake, ongoing gradual wt. Loss. Sig wt. Loss over past few mos  Plan: 1. Cont. Ensure  2. Monitor for increased daytime sleepiness  3. Assist with po intake  4. Follow wt.s    (Z71.89) ACP (advance care planning)  Comment: spoke with niece poa  Plan: 1. Comfort focus. Refer to hospice    Electronically signed by:  LAY Wheatley CNP                 Sincerely,        LAY Wheatley CNP

## 2023-05-08 NOTE — PROGRESS NOTES
[place] [facility]  [place] Medical Record Number:  [id number]  Place of Service where encounter took place:  [facility]  [facility] ([facility]) [[id number]]  Chief Complaint   Patient presents with     Anxiety       HPI:    Jewels Cortez  is a 88 year old (1935), who is being seen today for an episodic care visit.  HPI information obtained from: facility chart records, facility staff, [facility] chart review and family/first contact niece report. Today's concern is: anxiety, pain, wt. Loss. Has dementia. Labile mood, freq. Crying, calling out at times. Started on celexa last month which has not been effective.  Cont. On sched, prn risperdal. Has chronic pain-low back, R knee. Cont. On tylenol, aspercreme. No longer. Amb. Assist with transfers.  Has had further decrease in po intake. Cont., on Ensure. Down 26 lbs since last fall. Wt in 11/22 104 lbs.       Past Medical and Surgical History reviewed in Epic today.    MEDICATIONS:    Current Outpatient Medications   Medication Sig Dispense Refill     ACETAMINOPHEN EXTRA STRENGTH 500 MG tablet TAKE TWO TABLETS (1000MG) BY MOUTH TWICE DAILY *OK TO CRUSH* 360 tablet 97     citalopram (CELEXA) 10 MG tablet Take 10 mg by mouth daily       Nutritional Supplements (ENSURE) LIQD DRINK 1 BOTTLE BY MOUTH DAILY 237 mL 11     risperiDONE (RISPERDAL) 0.5 MG tablet Take 0.5 tablets (0.25 mg) by mouth At Bedtime. May also take 0.5 tablets (0.25 mg) daily as needed. 30 tablet 11     risperiDONE (RISPERDAL) 1 MG/ML solution TAKE 0.25ML (0.25MG) BY MOUTH EVERY NIGHT AT BEDTIME;& MAY TAKE 0.25ML (0.25MG) ONCE DAILY AS NEEDED 30 mL 11     SENEXON-S 8.6-50 MG tablet TAKE 1 TABLET BY MOUTH TWICE DAILY AS NEEDED FOR CONSTIPATION 30 tablet 11     SM ARTHRICREAM RUB 10 % external cream APPLY 1GM TOPICALLY TO RIGHT KNEE TWICE DAILY 85 g PRN         REVIEW OF SYSTEMS:  Unobtainable secondary to cognitive impairment.     Objective:  /79   Pulse 82   Resp 18   Wt  35.4 kg (78 lb)   SpO2 93%   BMI 14.27 kg/m    Exam:  GENERAL APPEARANCE:  in no distress, thin, cooperative  ENT:  Mouth and posterior oropharynx normal, moist mucous membranes, [place]  EYES:  EOM, conjunctivae, lids, pupils and irises normal, PERRL  NECK:  some neck flexion  RESP:  respiratory effort and palpation of chest normal, lungs clear to auscultation , no respiratory distress, diminished breath sounds bibasilar  CV:  Palpation and auscultation of heart done , regular rate and rhythm, no murmur, rub, or gallop, no edema  ABDOMEN:  normal bowel sounds, soft, nontender, no hepatosplenomegaly or other masses, no guarding or rebound  M/S:   muscle strength 5/5 UEs, gen. LE weakness. severe kyphosis  NEURO:   Cranial nerves 2-12 are normal tested and grossly at patient's baseline, no tremor  PSYCH:  affect abnormal -flat, no apparent anxiety    Labs:     Most Recent 3 BMP's:Recent Labs   Lab Test 11/11/22  0831 11/10/22  1705 11/10/22  0633 11/08/22  0736 11/07/22  0648 11/06/22  1730 11/06/22  0745 11/04/22  0542   NA  --   --   --   --  138  --  136 141   POTASSIUM 4.1 3.8 3.4   < > 3.9 4.5 3.5 4.2   CHLORIDE  --   --   --   --  104  --  99 105   CO2  --   --   --   --  26  --  26 26   BUN  --   --   --   --  10.1  --  13.2 11.8   CR 0.36*  --   --   --  0.46* 0.39* 0.45* 0.50*   ANIONGAP  --   --   --   --  8  --  11 10   MALICK  --   --   --   --  8.3*  --  8.8 8.4*   GLC  --   --   --   --  107*  --  157* 85    < > = values in this interval not displayed.       ASSESSMENT/PLAN:  (F41.9) Anxiety  (primary encounter diagnosis)  Comment: ongoing increased s/s  Plan: 1. Cont. celexa  2. Cont. Sched, prn risperdal  3. For calling out behaviors use prn risperdal    (R52) Pain  Comment: difficult to assess given cog. Decline. Decrease act. Level. Severe kyphosis. Chronic R knee, low back pain  Plan: 1. Cont. Tylenol  2. Cont. aspercreme  3. May consider tramadol  4. For increased difficulty with transfers may need  Jacoby lift.  5. Awaiting hosp. bed    (E43) Severe protein-calorie malnutrition (H)  Comment: ongoing decrease in po intake, ongoing gradual wt. Loss. Sig wt. Loss over past few mos  Plan: 1. Cont. Ensure  2. Monitor for increased daytime sleepiness  3. Assist with po intake  4. Follow wt.s    (Z71.89) ACP (advance care planning)  Comment: spoke with niece poa  Plan: 1. Comfort focus. Refer to hospice    Electronically signed by:  LAY Wheatley CNP

## 2023-05-15 NOTE — PROGRESS NOTES
[place] [facility]  [place] Medical Record Number:  [id number]  Place of Service where encounter took place:  [facility]  [facility] ([facility]) [[id number]]  Chief Complaint   Patient presents with     Anxiety     Fall       HPI:    Jewels Cortez  is a 88 year old (1935), who is being seen today for an episodic care visit.  HPI information obtained from: facility chart records, facility staff, patient report and [facility] chart review. Today's concern is: fall, anxiety, pain.  S/p fall out of chair 5/13/23. Abrasion to R flank. Apparently slid out of w.c.  Has had ongoing anxiety, crying episodes. Per staff, prn risperdal, ativan not effective. Has had ongoing attempts to self-transfer out of w.c. for pain cont. With aspercreme to knee. Started on prn MS. Denies pain today. Started on hospice cares.       Past Medical and Surgical History reviewed in Epic today.    MEDICATIONS:    Current Outpatient Medications   Medication Sig Dispense Refill     atropine 1 % SOLN Place 2 drops under the tongue every hour as needed for secretions       haloperidol (HALDOL) 0.5 MG tablet Take 0.5 mg by mouth every hour as needed for agitation       LORazepam (ATIVAN) 0.5 MG tablet Take 0.5 mg by mouth every hour as needed for anxiety       morphine 5 MG solu-tab Take 5 mg by mouth every hour as needed for shortness of breath or moderate to severe pain       ACETAMINOPHEN EXTRA STRENGTH 500 MG tablet TAKE TWO TABLETS (1000MG) BY MOUTH TWICE DAILY *OK TO CRUSH* 360 tablet 97     citalopram (CELEXA) 10 MG tablet Take 10 mg by mouth daily       Nutritional Supplements (ENSURE) LIQD DRINK 1 BOTTLE BY MOUTH DAILY 237 mL 11     risperiDONE (RISPERDAL) 0.5 MG tablet Take 0.5 tablets (0.25 mg) by mouth At Bedtime. May also take 0.5 tablets (0.25 mg) daily as needed. 30 tablet 11     risperiDONE (RISPERDAL) 1 MG/ML solution TAKE 0.25ML (0.25MG) BY MOUTH EVERY NIGHT AT BEDTIME;& MAY TAKE 0.25ML (0.25MG) ONCE DAILY AS  NEEDED 30 mL 11     SENEXON-S 8.6-50 MG tablet TAKE 1 TABLET BY MOUTH TWICE DAILY AS NEEDED FOR CONSTIPATION 30 tablet 11     SM ARTHRICREAM RUB 10 % external cream APPLY 1GM TOPICALLY TO RIGHT KNEE TWICE DAILY 85 g PRN         REVIEW OF SYSTEMS:  Limited secondary to cognitive impairment but today pt reports no current pain    Objective:  /66   Pulse 78   Resp 16   SpO2 93%   Exam:  GENERAL APPEARANCE:  Alert, thin, cooperative  ENT:  Mouth and posterior oropharynx normal, moist mucous membranes, [place]  EYES:  EOM, conjunctivae, lids, pupils and irises normal, PERRL  NECK:  FROM  RESP:  respiratory effort and palpation of chest normal, lungs clear to auscultation , no respiratory distress  CV:  Palpation and auscultation of heart done , regular rate and rhythm, no murmur, rub, or gallop, no edema  ABDOMEN:  normal bowel sounds, soft, nontender, no hepatosplenomegaly or other masses, no guarding or rebound  M/S:   muscle strength 5/5 UEs, gen. LE weakness. no apparent pain with PROM LEs. kyphosis  SKIN:  linear scab R flank, faint pink abrasion R flank  NEURO:   Cranial nerves 2-12 are normal tested and grossly at patient's baseline, no tremor  PSYCH:  insight and judgement impaired, memory impaired , affect abnormal -flat. appears anxious    Labs:     Most Recent 3 BMP's:Recent Labs   Lab Test 11/11/22  0831 11/10/22  1705 11/10/22  0633 11/08/22  0736 11/07/22  0648 11/06/22  1730 11/06/22  0745 11/04/22  0542   NA  --   --   --   --  138  --  136 141   POTASSIUM 4.1 3.8 3.4   < > 3.9 4.5 3.5 4.2   CHLORIDE  --   --   --   --  104  --  99 105   CO2  --   --   --   --  26  --  26 26   BUN  --   --   --   --  10.1  --  13.2 11.8   CR 0.36*  --   --   --  0.46* 0.39* 0.45* 0.50*   ANIONGAP  --   --   --   --  8  --  11 10   MALICK  --   --   --   --  8.3*  --  8.8 8.4*   GLC  --   --   --   --  107*  --  157* 85    < > = values in this interval not displayed.       ASSESSMENT/PLAN:  (F41.9) Anxiety  (primary  encounter diagnosis)  Comment: ongoing s/s. Crying episodes. Prn ativan, risperdal ineffective per staff  Plan: 1. Cont. Sched, prn risperdal  2. Cont. Prn ativan  3. Give prn haldol dose x 1 now  4. If haldol effective, may consider sched. Dose  5. Cont. celexa    (W19.XXXA) Fall, initial encounter  Comment: out of w.c. attempts to self-transfer  Plan: 1. Address anxiety as above  2. May consider Broda chair  3. Monitor for ongoing attempts to self-transfer    (R52) Pain  Comment: gen. Stable. Denies current pain  Plan: 1. Cont. aspercreme to knee  2. Cont as needed MS  3. Monitor for reports of increased pain  4. Cont. Hospice cares    Electronically signed by:  LAY Wheatley CNP

## 2023-05-15 NOTE — LETTER
5/15/2023        RE: [facility] Of [facility]  [address]  [address]  [facility] [address]        [place] [facility]  [place] Medical Record Number:  [id number]  Place of Service where encounter took place:  [facility]  [facility] ([facility]) [[id number]]  Chief Complaint   Patient presents with     Anxiety     Fall       HPI:    Jewels Cortez  is a 88 year old (1935), who is being seen today for an episodic care visit.  HPI information obtained from: facility chart records, facility staff, patient report and [facility] chart review. Today's concern is: fall, anxiety, pain.  S/p fall out of chair 5/13/23. Abrasion to R flank. Apparently slid out of w.c.  Has had ongoing anxiety, crying episodes. Per staff, prn risperdal, ativan not effective. Has had ongoing attempts to self-transfer out of w.c. for pain cont. With aspercreme to knee. Started on prn MS. Denies pain today. Started on hospice cares.       Past Medical and Surgical History reviewed in Epic today.    MEDICATIONS:    Current Outpatient Medications   Medication Sig Dispense Refill     atropine 1 % SOLN Place 2 drops under the tongue every hour as needed for secretions       haloperidol (HALDOL) 0.5 MG tablet Take 0.5 mg by mouth every hour as needed for agitation       LORazepam (ATIVAN) 0.5 MG tablet Take 0.5 mg by mouth every hour as needed for anxiety       morphine 5 MG solu-tab Take 5 mg by mouth every hour as needed for shortness of breath or moderate to severe pain       ACETAMINOPHEN EXTRA STRENGTH 500 MG tablet TAKE TWO TABLETS (1000MG) BY MOUTH TWICE DAILY *OK TO CRUSH* 360 tablet 97     citalopram (CELEXA) 10 MG tablet Take 10 mg by mouth daily       Nutritional Supplements (ENSURE) LIQD DRINK 1 BOTTLE BY MOUTH DAILY 237 mL 11     risperiDONE (RISPERDAL) 0.5 MG tablet Take 0.5 tablets (0.25 mg) by mouth At Bedtime. May also take 0.5 tablets (0.25 mg) daily as needed. 30 tablet 11     risperiDONE (RISPERDAL)  1 MG/ML solution TAKE 0.25ML (0.25MG) BY MOUTH EVERY NIGHT AT BEDTIME;& MAY TAKE 0.25ML (0.25MG) ONCE DAILY AS NEEDED 30 mL 11     SENEXON-S 8.6-50 MG tablet TAKE 1 TABLET BY MOUTH TWICE DAILY AS NEEDED FOR CONSTIPATION 30 tablet 11     SM ARTHRICREAM RUB 10 % external cream APPLY 1GM TOPICALLY TO RIGHT KNEE TWICE DAILY 85 g PRN         REVIEW OF SYSTEMS:  Limited secondary to cognitive impairment but today pt reports no current pain    Objective:  /66   Pulse 78   Resp 16   SpO2 93%   Exam:  GENERAL APPEARANCE:  Alert, thin, cooperative  ENT:  Mouth and posterior oropharynx normal, moist mucous membranes, [place]  EYES:  EOM, conjunctivae, lids, pupils and irises normal, PERRL  NECK:  FROM  RESP:  respiratory effort and palpation of chest normal, lungs clear to auscultation , no respiratory distress  CV:  Palpation and auscultation of heart done , regular rate and rhythm, no murmur, rub, or gallop, no edema  ABDOMEN:  normal bowel sounds, soft, nontender, no hepatosplenomegaly or other masses, no guarding or rebound  M/S:   muscle strength 5/5 UEs, gen. LE weakness. no apparent pain with PROM LEs. kyphosis  SKIN:  linear scab R flank, faint pink abrasion R flank  NEURO:   Cranial nerves 2-12 are normal tested and grossly at patient's baseline, no tremor  PSYCH:  insight and judgement impaired, memory impaired , affect abnormal -flat. appears anxious    Labs:     Most Recent 3 BMP's:Recent Labs   Lab Test 11/11/22  0831 11/10/22  1705 11/10/22  0633 11/08/22  0736 11/07/22  0648 11/06/22  1730 11/06/22  0745 11/04/22  0542   NA  --   --   --   --  138  --  136 141   POTASSIUM 4.1 3.8 3.4   < > 3.9 4.5 3.5 4.2   CHLORIDE  --   --   --   --  104  --  99 105   CO2  --   --   --   --  26  --  26 26   BUN  --   --   --   --  10.1  --  13.2 11.8   CR 0.36*  --   --   --  0.46* 0.39* 0.45* 0.50*   ANIONGAP  --   --   --   --  8  --  11 10   MALICK  --   --   --   --  8.3*  --  8.8 8.4*   GLC  --   --   --   --  107*  --   157* 85    < > = values in this interval not displayed.       ASSESSMENT/PLAN:  (F41.9) Anxiety  (primary encounter diagnosis)  Comment: ongoing s/s. Crying episodes. Prn ativan, risperdal ineffective per staff  Plan: 1. Cont. Sched, prn risperdal  2. Cont. Prn ativan  3. Give prn haldol dose x 1 now  4. If haldol effective, may consider sched. Dose  5. Cont. celexa    (W19.XXXA) Fall, initial encounter  Comment: out of w.c. attempts to self-transfer  Plan: 1. Address anxiety as above  2. May consider Broda chair  3. Monitor for ongoing attempts to self-transfer    (R52) Pain  Comment: gen. Stable. Denies current pain  Plan: 1. Cont. aspercreme to knee  2. Cont as needed MS  3. Monitor for reports of increased pain  4. Cont. Hospice cares    Electronically signed by:  LAY Wheatley CNP               Sincerely,        LAY Wheatley CNP

## 2023-05-24 NOTE — PROGRESS NOTES
"[facility]  [place] [facility] Record Number:  [id number]  Place of Service where encounter took place:  [facility]  [facility] ([facility]) [[id number]]  Chief Complaint   Patient presents with     RECHECK     Derm Problem       HPI:    Jewels Cortez  is a 88 year old (1935), who is being seen today for an episodic care visit.  HPI information obtained from: facility chart records, facility staff, patient report, [facility] chart review and family/first contact neice  report. Today's concern is: R heel pressure ulcer, LE weakness, dementia. Has decreased mobility. Limited amb. Has had pressure ulcer to R heel, started as blister, ruptured. Followed by HC RN. Has redness to buttocks at times. Gen. Weakness, requires assist to reposition in bed. Severe kyphosis. For dementia cont. On risperdal. No recent increase in paranoia or calling out behaviors.  Has had ongoing crying episodes during the day.       Past Medical and Surgical History reviewed in Epic today.    MEDICATIONS:    Current Outpatient Medications   Medication Sig Dispense Refill     citalopram (CELEXA) 10 MG tablet Take 10 mg by mouth daily       risperiDONE (RISPERDAL) 0.25 MG tablet Take 0.25 mg by mouth daily And every day prn       ACETAMINOPHEN EXTRA STRENGTH 500 MG tablet TAKE TWO TABLETS (1000MG) BY MOUTH TWICE DAILY *OK TO CRUSH* 360 tablet 97     Nutritional Supplements (ENSURE) LIQD DRINK 1 BOTTLE BY MOUTH DAILY 237 mL 11     SENEXON-S 8.6-50 MG tablet TAKE 1 TABLET BY MOUTH TWICE DAILY AS NEEDED FOR CONSTIPATION 30 tablet 11     SM ARTHRICREAM RUB 10 % external cream APPLY 1GM TOPICALLY TO RIGHT KNEE TWICE DAILY 85 g PRN         REVIEW OF SYSTEMS:  Limited secondary to cognitive impairment but today pt reports feeling ok    Objective:  /66   Pulse 78   Temp 97.2  F (36.2  C)   Resp 16   Ht 1.575 m (5' 2\")   Wt 40.4 kg (89 lb)   SpO2 94%   BMI 16.28 kg/m    Exam:  GENERAL APPEARANCE:  Alert, in no " distress, thin, cooperative  ENT:  Mouth and posterior oropharynx normal, moist mucous membranes, [place]  EYES:  EOM, conjunctivae, lids, pupils and irises normal, PERRL  NECK:  sl neck flexion  RESP:  respiratory effort and palpation of chest normal, lungs clear to auscultation , no respiratory distress, diminished breath sounds bibasilar  CV:  Palpation and auscultation of heart done , regular rate and rhythm, no murmur, rub, or gallop, no edema  ABDOMEN:  normal bowel sounds, soft, nontender, no hepatosplenomegaly or other masses, no guarding or rebound  M/S:   muscle strength 5/5 UEs, 4/5 LEs, severe kyphosis  SKIN:  R heel with area of dried, peeling skin, no current open area  NEURO:   Cranial nerves 2-12 are normal tested and grossly at patient's baseline, no tremor  PSYCH:  insight and judgement impaired, memory impaired , affect abnormal -flat    Labs:     Most Recent 3 BMP's:Recent Labs   Lab Test 11/11/22  0831 11/10/22  1705 11/10/22  0633 11/08/22  0736 11/07/22  0648 11/06/22  1730 11/06/22  0745 11/04/22  0542   NA  --   --   --   --  138  --  136 141   POTASSIUM 4.1 3.8 3.4   < > 3.9 4.5 3.5 4.2   CHLORIDE  --   --   --   --  104  --  99 105   CO2  --   --   --   --  26  --  26 26   BUN  --   --   --   --  10.1  --  13.2 11.8   CR 0.36*  --   --   --  0.46* 0.39* 0.45* 0.50*   ANIONGAP  --   --   --   --  8  --  11 10   MALICK  --   --   --   --  8.3*  --  8.8 8.4*   GLC  --   --   --   --  107*  --  157* 85    < > = values in this interval not displayed.       ASSESSMENT/PLAN:  (L89.612) Pressure ulcer of right heel, stage 2 (H)  (primary encounter diagnosis)  Comment: healing, dry skin, flaking present. Buttock redness, severe kyphosis  Plan: 1. Order fully elect. Hosp. Bed with mattress overlay  2. Float heels off bed at s.  3. Cont. HC RN, monitor heels for open areas    (R29.530) Weakness of both lower extremities  Comment: ongoing. Minimal amb. Assist with transfers, assist with bed repositioning.  Difficulty standing from seated position.  Plan: 1. Cont. Assisted transfer to w.c.  2. Niece stated she will be bringing in specialty cushion for w.c.  3. Cont. Tylenol for LE pain    (F03.918) Dementia with behavioral disturbance (H)  Comment: no recent increase in paranoia, agitation. Ongoing crying episodes throughout the day  Plan: 1. Decrease sched. risperdal to 0.25 mg at bedtime. Cont. Prn risperdal  2. Start celexa 10 mg every day  3. Reassess mood, behaviors over next couple weeks      Electronically signed by:  LAY Wheatley CNP       Face to Face and Medical Necessity Statement for DME Provider visit    Demographic Information on Jewels Cortez:  Gender: female  : 1935  [facility]  [address]    [place] [id number]  [phone] (home) [phone] (work)    Medical Record: [id number]  Social Security Number: [id number]  Primary Care Provider: Holden Block  Insurance: Payor: MEDICARE / Plan: MEDICARE / Product Type: Medicare /     HPI:   Jewels Cortez is a 88 year old  (1935), who is being seen today for a face to face provider visit at [facility]; medical necessity statement for DME included. This patient requires the following:  DME Ordered and Medical Necessity Statement   The patient does  require positioning of the body in ways not feasible with an ordinary bed due to a medical condition that is expected to last at least 1 month due to r heel pressure ulcer, severe kyphosis.   The patient does  require, for the alleviation of pain, postioning of the body in ways not feasible with an ordinary bed.   The patient does not require the head of bed elevated more than 30* most of the time due to CHF, chronic pulmonary disease or aspiration.  The patient does not require traction that can only be attached to a hospital bed.  The patient does  require a bed height different than a fixed height hospital bed to permit tranfers to wheelchair or  "standing position.   The patient does  require frequent or immediate changes in body position due to pain. Pressure ulcer.       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        EXAM  Vitals: /66   Pulse 78   Temp 97.2  F (36.2  C)   Resp 16   Ht 1.575 m (5' 2\")   Wt 40.4 kg (89 lb)   SpO2 94%   BMI 16.28 kg/m  ;BMI= Body mass index is 16.28 kg/m .      ASSESSMENT/PLAN:  1. Pressure ulcer of right heel, stage 2 (H)    2. Weakness of both lower extremities    3. Dementia with behavioral disturbance (H)        Orders: Fully electric hospital bed with specialty overlay for mattress  1. Facility staff/TC to contact DME company to get their order form for provider to fill out    ELECTRONICALLY SIGNED BY AALIYAH CERTIFIED PROVIDER:  LAY Wheatley CNP   NPI: [id number]  [facility]  [address] [place] [address], [address]  [place] [address]              " Include Location In Plan?: No Detail Level: Zone Additional Note: I reassured that lesion was benign, but could be removed for cosmetic reasons.

## 2023-05-25 NOTE — PROGRESS NOTES
[facility]  [place] Medical Record Number:  [id number]  Place of Service where encounter took place:  [facility]  [facility] ([facility]) [[id number]]  Chief Complaint   Patient presents with     Anxiety       HPI:    Jewels Cortez  is a 88 year old (1935), who is being seen today for an episodic care visit.  HPI information obtained from: facility chart records, facility staff and [facility] chart review. Today's concern is: anxiety, weakness, pain. Cont. On hospice cares. Has had ongoing anxiety. Cont. On risperdal, prn haldol, ativan. Loss of language. Has dementia. Has been bed bound. Gen. Weakness. For pain cont. On prn MS.       Past Medical and Surgical History reviewed in Epic today.    MEDICATIONS:    Current Outpatient Medications   Medication Sig Dispense Refill     ACETAMINOPHEN EXTRA STRENGTH 500 MG tablet TAKE TWO TABLETS (1000MG) BY MOUTH TWICE DAILY *OK TO CRUSH* 360 tablet 97     atropine 1 % SOLN Place 2 drops under the tongue every hour as needed for secretions       citalopram (CELEXA) 10 MG tablet Take 10 mg by mouth daily       haloperidol (HALDOL) 0.5 MG tablet Take 0.5 mg by mouth every hour as needed for agitation       LORazepam (ATIVAN) 0.5 MG tablet Take 0.5 mg by mouth every hour as needed for anxiety       morphine 5 MG solu-tab Take 5 mg by mouth every hour as needed for shortness of breath or moderate to severe pain       Nutritional Supplements (ENSURE) LIQD DRINK 1 BOTTLE BY MOUTH DAILY 237 mL 11     risperiDONE (RISPERDAL) 0.5 MG tablet Take 0.5 tablets (0.25 mg) by mouth At Bedtime. May also take 0.5 tablets (0.25 mg) daily as needed. 30 tablet 11     risperiDONE (RISPERDAL) 1 MG/ML solution TAKE 0.25ML (0.25MG) BY MOUTH EVERY NIGHT AT BEDTIME;& MAY TAKE 0.25ML (0.25MG) ONCE DAILY AS NEEDED 30 mL 11     SENEXON-S 8.6-50 MG tablet TAKE 1 TABLET BY MOUTH TWICE DAILY AS NEEDED FOR CONSTIPATION 30 tablet 11     SM ARTHRICREAM RUB 10 % external cream  APPLY 1GM TOPICALLY TO RIGHT KNEE TWICE DAILY 85 g PRN         REVIEW OF SYSTEMS:  Unobtainable secondary to cognitive impairment.     Objective:  /45   Pulse 77   Resp 20   SpO2 93%   Exam:  GENERAL APPEARANCE:  Alert, thin, anxious  ENT:  Mouth and posterior oropharynx normal, moist mucous membranes, [place]  EYES:  EOM, conjunctivae, lids, pupils and irises normal, PERRL  RESP:  respiratory effort and palpation of chest normal, lungs clear to auscultation , no respiratory distress, diminished breath sounds bibasilar  CV:  Palpation and auscultation of heart done , regular rate and rhythm, no murmur, rub, or gallop, no edema  ABDOMEN:  normal bowel sounds, soft, nontender, no hepatosplenomegaly or other masses, no guarding or rebound  M/S:   muscle strength 5/5 UEs, gen. LE weakness. some rigidity of UEs, no contractures  NEURO:   Cranial nerves 2-12 are normal tested and grossly at patient's baseline, no tremor  PSYCH:  affect abnormal -flat. appears anxious. not tearful    Labs:     Most Recent 3 BMP's:Recent Labs   Lab Test 11/11/22  0831 11/10/22  1705 11/10/22  0633 11/08/22  0736 11/07/22  0648 11/06/22  1730 11/06/22  0745 11/04/22  0542   NA  --   --   --   --  138  --  136 141   POTASSIUM 4.1 3.8 3.4   < > 3.9 4.5 3.5 4.2   CHLORIDE  --   --   --   --  104  --  99 105   CO2  --   --   --   --  26  --  26 26   BUN  --   --   --   --  10.1  --  13.2 11.8   CR 0.36*  --   --   --  0.46* 0.39* 0.45* 0.50*   ANIONGAP  --   --   --   --  8  --  11 10   MALICK  --   --   --   --  8.3*  --  8.8 8.4*   GLC  --   --   --   --  107*  --  157* 85    < > = values in this interval not displayed.       ASSESSMENT/PLAN:  (F41.9) Anxiety  (primary encounter diagnosis)  Comment: ongoing s/s. Per staff, cries at times  Plan: 1. Cont. Sched, prn risperdal  2. Cont. Prn ativan, haldol  3. For ongoing s/s, may consider sched ativan doses    (M62.81) Generalized muscle weakness  Comment: bed bound.   Plan:1. Cont. Freq.  Repositioning while in bed  2. Monitor for skin breakdown  3. Cont. mech soft diet, elevate HOB for po intake    (R52) Pain  Comment: h/o low back, LE pain, difficulty to assess given cog. Decline.   Plan: 1. Cont. Prn MS  2. Freq. Reposition as above  3. For s/s pain, facial grimacing, may sched. MS doses  4. Cont. Hospice cares    Electronically signed by:  LAY Wheatley CNP

## 2023-05-25 NOTE — LETTER
5/25/2023        RE: [facility]  [address]  [address]  [facility] [address]        [facility]  [place] [facility] Record Number:  [id number]  Place of Service where encounter took place:  [facility]  [facility] ([facility]) [[id number]]  Chief Complaint   Patient presents with     Anxiety       HPI:    Jewels Cortez  is a 88 year old (1935), who is being seen today for an episodic care visit.  HPI information obtained from: facility chart records, facility staff and [facility] chart review. Today's concern is: anxiety, weakness, pain. Cont. On hospice cares. Has had ongoing anxiety. Cont. On risperdal, prn haldol, ativan. Loss of language. Has dementia. Has been bed bound. Gen. Weakness. For pain cont. On prn MS.       Past Medical and Surgical History reviewed in Epic today.    MEDICATIONS:    Current Outpatient Medications   Medication Sig Dispense Refill     ACETAMINOPHEN EXTRA STRENGTH 500 MG tablet TAKE TWO TABLETS (1000MG) BY MOUTH TWICE DAILY *OK TO CRUSH* 360 tablet 97     atropine 1 % SOLN Place 2 drops under the tongue every hour as needed for secretions       citalopram (CELEXA) 10 MG tablet Take 10 mg by mouth daily       haloperidol (HALDOL) 0.5 MG tablet Take 0.5 mg by mouth every hour as needed for agitation       LORazepam (ATIVAN) 0.5 MG tablet Take 0.5 mg by mouth every hour as needed for anxiety       morphine 5 MG solu-tab Take 5 mg by mouth every hour as needed for shortness of breath or moderate to severe pain       Nutritional Supplements (ENSURE) LIQD DRINK 1 BOTTLE BY MOUTH DAILY 237 mL 11     risperiDONE (RISPERDAL) 0.5 MG tablet Take 0.5 tablets (0.25 mg) by mouth At Bedtime. May also take 0.5 tablets (0.25 mg) daily as needed. 30 tablet 11     risperiDONE (RISPERDAL) 1 MG/ML solution TAKE 0.25ML (0.25MG) BY MOUTH EVERY NIGHT AT BEDTIME;& MAY TAKE 0.25ML (0.25MG) ONCE DAILY AS NEEDED 30 mL 11     SENEXON-S 8.6-50 MG tablet TAKE 1  TABLET BY MOUTH TWICE DAILY AS NEEDED FOR CONSTIPATION 30 tablet 11     SM ARTHRICREAM RUB 10 % external cream APPLY 1GM TOPICALLY TO RIGHT KNEE TWICE DAILY 85 g PRN         REVIEW OF SYSTEMS:  Unobtainable secondary to cognitive impairment.     Objective:  /45   Pulse 77   Resp 20   SpO2 93%   Exam:  GENERAL APPEARANCE:  Alert, thin, anxious  ENT:  Mouth and posterior oropharynx normal, moist mucous membranes, [place]  EYES:  EOM, conjunctivae, lids, pupils and irises normal, PERRL  RESP:  respiratory effort and palpation of chest normal, lungs clear to auscultation , no respiratory distress, diminished breath sounds bibasilar  CV:  Palpation and auscultation of heart done , regular rate and rhythm, no murmur, rub, or gallop, no edema  ABDOMEN:  normal bowel sounds, soft, nontender, no hepatosplenomegaly or other masses, no guarding or rebound  M/S:   muscle strength 5/5 UEs, gen. LE weakness. some rigidity of UEs, no contractures  NEURO:   Cranial nerves 2-12 are normal tested and grossly at patient's baseline, no tremor  PSYCH:  affect abnormal -flat. appears anxious. not tearful    Labs:     Most Recent 3 BMP's:Recent Labs   Lab Test 11/11/22  0831 11/10/22  1705 11/10/22  0633 11/08/22  0736 11/07/22  0648 11/06/22  1730 11/06/22  0745 11/04/22  0542   NA  --   --   --   --  138  --  136 141   POTASSIUM 4.1 3.8 3.4   < > 3.9 4.5 3.5 4.2   CHLORIDE  --   --   --   --  104  --  99 105   CO2  --   --   --   --  26  --  26 26   BUN  --   --   --   --  10.1  --  13.2 11.8   CR 0.36*  --   --   --  0.46* 0.39* 0.45* 0.50*   ANIONGAP  --   --   --   --  8  --  11 10   MALICK  --   --   --   --  8.3*  --  8.8 8.4*   GLC  --   --   --   --  107*  --  157* 85    < > = values in this interval not displayed.       ASSESSMENT/PLAN:  (F41.9) Anxiety  (primary encounter diagnosis)  Comment: ongoing s/s. Per staff, cries at times  Plan: 1. Cont. Sched, prn risperdal  2. Cont. Prn ativan, haldol  3. For ongoing s/s, may  consider sched ativan doses    (M62.81) Generalized muscle weakness  Comment: bed bound.   Plan:1. Cont. Freq. Repositioning while in bed  2. Monitor for skin breakdown  3. Cont. mech soft diet, elevate HOB for po intake    (R52) Pain  Comment: h/o low back, LE pain, difficulty to assess given cog. Decline.   Plan: 1. Cont. Prn MS  2. Freq. Reposition as above  3. For s/s pain, facial grimacing, may sched. MS doses  4. Cont. Hospice cares    Electronically signed by:  LAY Wheatley CNP               Sincerely,        LAY Wheatley CNP

## (undated) DEVICE — LINEN HALF SHEET 5512

## (undated) DEVICE — ESU GROUND PAD ADULT W/CORD E7507

## (undated) DEVICE — NDL 22GA 1.5"

## (undated) DEVICE — LINEN FULL SHEET 5511

## (undated) DEVICE — DRILL BIT STRK SHORT 4.2X180MM  1806-4270S

## (undated) DEVICE — GLOVE BIOGEL PI MICRO SZ 7.5 48575

## (undated) DEVICE — GLOVE BIOGEL PI MICRO SZ 6.5 48565

## (undated) DEVICE — DRSG GAUZE 4X4" TRAY

## (undated) DEVICE — DRAPE X-RAY TUBE 00-901169-01-OEC

## (undated) DEVICE — SU VICRYL 2-0 CT-1 27" UND J259H

## (undated) DEVICE — Device

## (undated) DEVICE — PREP CHLORAPREP 26ML TINTED HI-LITE ORANGE 930815

## (undated) DEVICE — SYR 30ML LL W/O NDL 302832

## (undated) DEVICE — GLOVE BIOGEL PI MICRO INDICATOR UNDERGLOVE SZ 7.5 48975

## (undated) DEVICE — CAST PADDING 4" UNSTERILE 9044

## (undated) DEVICE — DRSG XEROFORM 5X9" 8884431605

## (undated) DEVICE — REAMER SHAFT MODIFIED TRINKLE 8X510MM 0227-8510S

## (undated) DEVICE — DRSG GAUZE 4X4" 8044

## (undated) DEVICE — CLIP TUBE  STRK  GAMMA3  CLOSED 1320-0125S

## (undated) DEVICE — DRSG ABDOMINAL 07 1/2X8" 7197D

## (undated) DEVICE — DRILL BIT STRK GAMMA 4.2X300MM  1320-3042S

## (undated) DEVICE — PACK HIP NAILING SOP32HPFC4

## (undated) DEVICE — BAG CLEAR TRASH 1.3M 39X33" P4040C

## (undated) DEVICE — DRSG TEGADERM 4X4 3/4" 1626W

## (undated) DEVICE — LINEN ORTHO ACL PACK 5447

## (undated) DEVICE — GLOVE BIOGEL PI MICRO INDICATOR UNDERGLOVE SZ 6.5 48965

## (undated) DEVICE — WIRE GUIDE STRK BALL TIP 3X800MM 1806-0080S

## (undated) DEVICE — SOL NACL 0.9% IRRIG 1000ML BOTTLE 2F7124

## (undated) DEVICE — STPL SKIN 35W 6.9MM  PXW35

## (undated) RX ORDER — NEOSTIGMINE METHYLSULFATE 1 MG/ML
VIAL (ML) INJECTION
Status: DISPENSED
Start: 2022-01-01

## (undated) RX ORDER — GLYCOPYRROLATE 0.2 MG/ML
INJECTION INTRAMUSCULAR; INTRAVENOUS
Status: DISPENSED
Start: 2022-01-01

## (undated) RX ORDER — CLINDAMYCIN PHOSPHATE 900 MG/50ML
INJECTION, SOLUTION INTRAVENOUS
Status: DISPENSED
Start: 2022-01-01

## (undated) RX ORDER — BUPIVACAINE HYDROCHLORIDE AND EPINEPHRINE 5; 5 MG/ML; UG/ML
INJECTION, SOLUTION EPIDURAL; INTRACAUDAL; PERINEURAL
Status: DISPENSED
Start: 2022-01-01

## (undated) RX ORDER — TRANEXAMIC ACID 10 MG/ML
INJECTION, SOLUTION INTRAVENOUS
Status: DISPENSED
Start: 2022-01-01

## (undated) RX ORDER — FENTANYL CITRATE 50 UG/ML
INJECTION, SOLUTION INTRAMUSCULAR; INTRAVENOUS
Status: DISPENSED
Start: 2022-01-01